# Patient Record
Sex: MALE | Race: WHITE | Employment: OTHER | URBAN - METROPOLITAN AREA
[De-identification: names, ages, dates, MRNs, and addresses within clinical notes are randomized per-mention and may not be internally consistent; named-entity substitution may affect disease eponyms.]

---

## 2017-04-05 ENCOUNTER — OFFICE VISIT (OUTPATIENT)
Dept: FAMILY MEDICINE CLINIC | Age: 77
End: 2017-04-05
Payer: MEDICARE

## 2017-04-05 VITALS
SYSTOLIC BLOOD PRESSURE: 118 MMHG | HEART RATE: 88 BPM | WEIGHT: 215 LBS | DIASTOLIC BLOOD PRESSURE: 72 MMHG | OXYGEN SATURATION: 96 % | BODY MASS INDEX: 31.75 KG/M2

## 2017-04-05 DIAGNOSIS — R22.42 LEG MASS, LEFT: ICD-10-CM

## 2017-04-05 DIAGNOSIS — R94.31 ABNORMAL EKG: ICD-10-CM

## 2017-04-05 DIAGNOSIS — J44.9 OBSTRUCTIVE CHRONIC BRONCHITIS WITHOUT EXACERBATION (HCC): Primary | ICD-10-CM

## 2017-04-05 DIAGNOSIS — F34.1 DYSTHYMIC DISORDER: ICD-10-CM

## 2017-04-05 DIAGNOSIS — R07.89 OTHER CHEST PAIN: ICD-10-CM

## 2017-04-05 DIAGNOSIS — I10 ESSENTIAL HYPERTENSION: ICD-10-CM

## 2017-04-05 DIAGNOSIS — E78.00 PURE HYPERCHOLESTEROLEMIA: ICD-10-CM

## 2017-04-05 PROCEDURE — G8417 CALC BMI ABV UP PARAM F/U: HCPCS | Performed by: FAMILY MEDICINE

## 2017-04-05 PROCEDURE — 93000 ELECTROCARDIOGRAM COMPLETE: CPT | Performed by: FAMILY MEDICINE

## 2017-04-05 PROCEDURE — 1036F TOBACCO NON-USER: CPT | Performed by: FAMILY MEDICINE

## 2017-04-05 PROCEDURE — 3023F SPIROM DOC REV: CPT | Performed by: FAMILY MEDICINE

## 2017-04-05 PROCEDURE — 4040F PNEUMOC VAC/ADMIN/RCVD: CPT | Performed by: FAMILY MEDICINE

## 2017-04-05 PROCEDURE — 1123F ACP DISCUSS/DSCN MKR DOCD: CPT | Performed by: FAMILY MEDICINE

## 2017-04-05 PROCEDURE — G8926 SPIRO NO PERF OR DOC: HCPCS | Performed by: FAMILY MEDICINE

## 2017-04-05 PROCEDURE — 99214 OFFICE O/P EST MOD 30 MIN: CPT | Performed by: FAMILY MEDICINE

## 2017-04-05 PROCEDURE — G8427 DOCREV CUR MEDS BY ELIG CLIN: HCPCS | Performed by: FAMILY MEDICINE

## 2017-04-05 RX ORDER — AMLODIPINE BESYLATE 5 MG/1
5 TABLET ORAL DAILY
Qty: 90 TABLET | Refills: 3 | Status: SHIPPED | OUTPATIENT
Start: 2017-04-05 | End: 2017-05-05 | Stop reason: SDUPTHER

## 2017-04-05 RX ORDER — LOVASTATIN 40 MG/1
40 TABLET ORAL NIGHTLY
Qty: 90 TABLET | Refills: 3 | Status: SHIPPED | OUTPATIENT
Start: 2017-04-05 | End: 2017-10-09 | Stop reason: SDUPTHER

## 2017-04-05 RX ORDER — PROPRANOLOL HYDROCHLORIDE 80 MG/1
80 CAPSULE, EXTENDED RELEASE ORAL DAILY
Qty: 90 CAPSULE | Refills: 3 | Status: SHIPPED | OUTPATIENT
Start: 2017-04-05 | End: 2017-10-09 | Stop reason: SDUPTHER

## 2017-04-05 RX ORDER — LISINOPRIL 30 MG/1
30 TABLET ORAL DAILY
Qty: 90 TABLET | Refills: 3 | Status: SHIPPED | OUTPATIENT
Start: 2017-04-05 | End: 2017-10-09 | Stop reason: SDUPTHER

## 2017-04-05 ASSESSMENT — ENCOUNTER SYMPTOMS
HOARSE VOICE: 0
DIFFICULTY BREATHING: 1
COUGH: 0
SHORTNESS OF BREATH: 1
CHEST TIGHTNESS: 0
WHEEZING: 0
SPUTUM PRODUCTION: 0
BLURRED VISION: 0

## 2017-04-05 ASSESSMENT — COPD QUESTIONNAIRES: COPD: 1

## 2017-04-16 ASSESSMENT — ENCOUNTER SYMPTOMS
CONSTIPATION: 0
NAUSEA: 0
TROUBLE SWALLOWING: 0
DIARRHEA: 0
FACIAL SWELLING: 0
PHOTOPHOBIA: 0
COLOR CHANGE: 0
ABDOMINAL PAIN: 0
ABDOMINAL DISTENTION: 0
VOMITING: 0
BACK PAIN: 0

## 2017-04-21 ENCOUNTER — HOSPITAL ENCOUNTER (OUTPATIENT)
Dept: NUCLEAR MEDICINE | Age: 77
Discharge: HOME | End: 2017-04-21
Payer: MEDICARE

## 2017-04-21 ENCOUNTER — HOSPITAL ENCOUNTER (OUTPATIENT)
Dept: NON INVASIVE DIAGNOSTICS | Age: 77
Discharge: HOME | End: 2017-04-21
Payer: MEDICARE

## 2017-04-21 DIAGNOSIS — R07.89 OTHER CHEST PAIN: ICD-10-CM

## 2017-04-21 DIAGNOSIS — R94.31 ABNORMAL EKG: ICD-10-CM

## 2017-04-21 LAB
LV EF: 60 %
LVEF MODALITY: NORMAL

## 2017-04-21 PROCEDURE — 78452 HT MUSCLE IMAGE SPECT MULT: CPT

## 2017-04-21 PROCEDURE — 93306 TTE W/DOPPLER COMPLETE: CPT

## 2017-04-21 PROCEDURE — 3430000000 HC RX DIAGNOSTIC RADIOPHARMACEUTICAL: Performed by: FAMILY MEDICINE

## 2017-04-21 PROCEDURE — A9500 TC99M SESTAMIBI: HCPCS | Performed by: FAMILY MEDICINE

## 2017-04-21 PROCEDURE — 93017 CV STRESS TEST TRACING ONLY: CPT

## 2017-04-21 RX ADMIN — TETRAKIS(2-METHOXYISOBUTYLISOCYANIDE)COPPER(I) TETRAFLUOROBORATE 30 MILLICURIE: 1 INJECTION, POWDER, LYOPHILIZED, FOR SOLUTION INTRAVENOUS at 09:20

## 2017-04-21 RX ADMIN — TETRAKIS(2-METHOXYISOBUTYLISOCYANIDE)COPPER(I) TETRAFLUOROBORATE 10 MILLICURIE: 1 INJECTION, POWDER, LYOPHILIZED, FOR SOLUTION INTRAVENOUS at 08:05

## 2017-04-25 ENCOUNTER — TELEPHONE (OUTPATIENT)
Dept: FAMILY MEDICINE CLINIC | Age: 77
End: 2017-04-25

## 2017-04-25 DIAGNOSIS — R94.39 ABNORMAL STRESS TEST: Primary | ICD-10-CM

## 2017-05-01 ENCOUNTER — TELEPHONE (OUTPATIENT)
Dept: CARDIOLOGY CLINIC | Age: 77
End: 2017-05-01

## 2017-05-01 DIAGNOSIS — I10 ESSENTIAL HYPERTENSION, BENIGN: ICD-10-CM

## 2017-05-01 DIAGNOSIS — E55.9 UNSPECIFIED VITAMIN D DEFICIENCY: ICD-10-CM

## 2017-05-01 DIAGNOSIS — E78.00 PURE HYPERCHOLESTEROLEMIA: ICD-10-CM

## 2017-05-01 DIAGNOSIS — R94.39 ABNORMAL STRESS TEST: Primary | ICD-10-CM

## 2017-05-01 DIAGNOSIS — J43.1 PANLOBULAR EMPHYSEMA (HCC): ICD-10-CM

## 2017-05-01 DIAGNOSIS — R06.02 SHORTNESS OF BREATH: ICD-10-CM

## 2017-05-01 DIAGNOSIS — R07.9 CHEST PAIN, UNSPECIFIED TYPE: ICD-10-CM

## 2017-05-05 ENCOUNTER — HOSPITAL ENCOUNTER (OUTPATIENT)
Age: 77
Discharge: HOME | End: 2017-05-05
Payer: MEDICARE

## 2017-05-05 ENCOUNTER — HOSPITAL ENCOUNTER (OUTPATIENT)
Dept: GENERAL RADIOLOGY | Age: 77
Discharge: HOME | End: 2017-05-05
Payer: MEDICARE

## 2017-05-05 ENCOUNTER — OFFICE VISIT (OUTPATIENT)
Dept: CARDIOLOGY CLINIC | Age: 77
End: 2017-05-05
Payer: MEDICARE

## 2017-05-05 VITALS
BODY MASS INDEX: 31.16 KG/M2 | SYSTOLIC BLOOD PRESSURE: 170 MMHG | WEIGHT: 211 LBS | DIASTOLIC BLOOD PRESSURE: 90 MMHG | OXYGEN SATURATION: 94 % | HEART RATE: 66 BPM

## 2017-05-05 DIAGNOSIS — R06.02 SHORTNESS OF BREATH: ICD-10-CM

## 2017-05-05 DIAGNOSIS — R94.39 ABNORMAL STRESS TEST: ICD-10-CM

## 2017-05-05 DIAGNOSIS — E78.00 PURE HYPERCHOLESTEROLEMIA: ICD-10-CM

## 2017-05-05 DIAGNOSIS — R06.02 SOB (SHORTNESS OF BREATH): Primary | ICD-10-CM

## 2017-05-05 DIAGNOSIS — I10 ESSENTIAL HYPERTENSION, BENIGN: ICD-10-CM

## 2017-05-05 DIAGNOSIS — R07.9 CHEST PAIN, UNSPECIFIED TYPE: ICD-10-CM

## 2017-05-05 DIAGNOSIS — J43.1 PANLOBULAR EMPHYSEMA (HCC): ICD-10-CM

## 2017-05-05 DIAGNOSIS — E55.9 UNSPECIFIED VITAMIN D DEFICIENCY: ICD-10-CM

## 2017-05-05 DIAGNOSIS — F34.1 DYSTHYMIC DISORDER: ICD-10-CM

## 2017-05-05 LAB
ABSOLUTE EOS #: 0.3 K/UL (ref 0–0.4)
ABSOLUTE LYMPH #: 2.3 K/UL (ref 0.9–2.5)
ABSOLUTE MONO #: 0.7 K/UL (ref 0–1)
ALBUMIN SERPL-MCNC: 4.6 G/DL (ref 3.5–5.2)
ALBUMIN/GLOBULIN RATIO: ABNORMAL (ref 1–2.5)
ALP BLD-CCNC: 74 U/L (ref 40–129)
ALT SERPL-CCNC: 19 U/L (ref 5–41)
ANION GAP SERPL CALCULATED.3IONS-SCNC: 10 MMOL/L (ref 9–17)
AST SERPL-CCNC: 20 U/L
BASOPHILS # BLD: 1 %
BASOPHILS ABSOLUTE: 0.1 K/UL (ref 0–0.2)
BILIRUB SERPL-MCNC: 0.51 MG/DL (ref 0.3–1.2)
BUN BLDV-MCNC: 19 MG/DL (ref 8–23)
BUN/CREAT BLD: 20 (ref 9–20)
CALCIUM SERPL-MCNC: 9.7 MG/DL (ref 8.6–10.4)
CHLORIDE BLD-SCNC: 104 MMOL/L (ref 98–107)
CHOLESTEROL/HDL RATIO: 2.9
CHOLESTEROL: 164 MG/DL
CO2: 28 MMOL/L (ref 20–31)
CREAT SERPL-MCNC: 0.94 MG/DL (ref 0.7–1.2)
DIFFERENTIAL TYPE: YES
EOSINOPHILS RELATIVE PERCENT: 4 %
GFR AFRICAN AMERICAN: >60 ML/MIN
GFR NON-AFRICAN AMERICAN: >60 ML/MIN
GFR SERPL CREATININE-BSD FRML MDRD: ABNORMAL ML/MIN/{1.73_M2}
GFR SERPL CREATININE-BSD FRML MDRD: ABNORMAL ML/MIN/{1.73_M2}
GLUCOSE BLD-MCNC: 118 MG/DL (ref 70–99)
HCT VFR BLD CALC: 45.7 % (ref 41–53)
HDLC SERPL-MCNC: 57 MG/DL
HEMOGLOBIN: 15 G/DL (ref 13.5–17.5)
LDL CHOLESTEROL: 87 MG/DL (ref 0–130)
LYMPHOCYTES # BLD: 28 %
MAGNESIUM: 2.5 MG/DL (ref 1.6–2.6)
MCH RBC QN AUTO: 31 PG (ref 26–34)
MCHC RBC AUTO-ENTMCNC: 32.9 G/DL (ref 31–37)
MCV RBC AUTO: 94.3 FL (ref 80–100)
MONOCYTES # BLD: 8 %
PATIENT FASTING?: YES
PDW BLD-RTO: 12.5 % (ref 12.1–15.2)
PLATELET # BLD: 253 K/UL (ref 140–450)
PLATELET ESTIMATE: NORMAL
PMV BLD AUTO: NORMAL FL (ref 6–12)
POTASSIUM SERPL-SCNC: 5 MMOL/L (ref 3.7–5.3)
RBC # BLD: 4.84 M/UL (ref 4.5–5.9)
RBC # BLD: NORMAL 10*6/UL
SEG NEUTROPHILS: 59 %
SEGMENTED NEUTROPHILS ABSOLUTE COUNT: 5 K/UL (ref 2.1–6.5)
SODIUM BLD-SCNC: 142 MMOL/L (ref 135–144)
TOTAL PROTEIN: 7.7 G/DL (ref 6.4–8.3)
TRIGL SERPL-MCNC: 102 MG/DL
TSH SERPL DL<=0.05 MIU/L-ACNC: 1.31 MIU/L (ref 0.3–5)
VITAMIN D 25-HYDROXY: 30.2 NG/ML (ref 30–100)
VLDLC SERPL CALC-MCNC: NORMAL MG/DL (ref 1–30)
WBC # BLD: 8.3 K/UL (ref 3.5–11)
WBC # BLD: NORMAL 10*3/UL

## 2017-05-05 PROCEDURE — 80061 LIPID PANEL: CPT

## 2017-05-05 PROCEDURE — 99204 OFFICE O/P NEW MOD 45 MIN: CPT | Performed by: INTERNAL MEDICINE

## 2017-05-05 PROCEDURE — 82306 VITAMIN D 25 HYDROXY: CPT

## 2017-05-05 PROCEDURE — 80053 COMPREHEN METABOLIC PANEL: CPT

## 2017-05-05 PROCEDURE — 71020 XR CHEST STANDARD TWO VW: CPT

## 2017-05-05 PROCEDURE — 36415 COLL VENOUS BLD VENIPUNCTURE: CPT

## 2017-05-05 PROCEDURE — 93005 ELECTROCARDIOGRAM TRACING: CPT

## 2017-05-05 PROCEDURE — 1036F TOBACCO NON-USER: CPT | Performed by: INTERNAL MEDICINE

## 2017-05-05 PROCEDURE — G8417 CALC BMI ABV UP PARAM F/U: HCPCS | Performed by: INTERNAL MEDICINE

## 2017-05-05 PROCEDURE — 4040F PNEUMOC VAC/ADMIN/RCVD: CPT | Performed by: INTERNAL MEDICINE

## 2017-05-05 PROCEDURE — 85025 COMPLETE CBC W/AUTO DIFF WBC: CPT

## 2017-05-05 PROCEDURE — 1123F ACP DISCUSS/DSCN MKR DOCD: CPT | Performed by: INTERNAL MEDICINE

## 2017-05-05 PROCEDURE — 84443 ASSAY THYROID STIM HORMONE: CPT

## 2017-05-05 PROCEDURE — G8427 DOCREV CUR MEDS BY ELIG CLIN: HCPCS | Performed by: INTERNAL MEDICINE

## 2017-05-05 PROCEDURE — 83735 ASSAY OF MAGNESIUM: CPT

## 2017-05-05 RX ORDER — AMLODIPINE BESYLATE 5 MG/1
5 TABLET ORAL 2 TIMES DAILY
Qty: 180 TABLET | Refills: 3 | Status: SHIPPED | OUTPATIENT
Start: 2017-05-05 | End: 2017-10-09 | Stop reason: SDUPTHER

## 2017-05-08 ENCOUNTER — HOSPITAL ENCOUNTER (OUTPATIENT)
Dept: PULMONOLOGY | Age: 77
Discharge: HOME | End: 2017-05-08
Payer: MEDICARE

## 2017-05-08 DIAGNOSIS — R06.02 SOB (SHORTNESS OF BREATH): ICD-10-CM

## 2017-05-08 PROCEDURE — 94729 DIFFUSING CAPACITY: CPT

## 2017-05-08 PROCEDURE — 94729 DIFFUSING CAPACITY: CPT | Performed by: INTERNAL MEDICINE

## 2017-05-08 PROCEDURE — 6360000002 HC RX W HCPCS: Performed by: INTERNAL MEDICINE

## 2017-05-08 PROCEDURE — 94060 EVALUATION OF WHEEZING: CPT | Performed by: INTERNAL MEDICINE

## 2017-05-08 PROCEDURE — 94060 EVALUATION OF WHEEZING: CPT

## 2017-05-08 PROCEDURE — 94726 PLETHYSMOGRAPHY LUNG VOLUMES: CPT

## 2017-05-08 RX ORDER — ALBUTEROL SULFATE 2.5 MG/3ML
2.5 SOLUTION RESPIRATORY (INHALATION) ONCE
Status: COMPLETED | OUTPATIENT
Start: 2017-05-08 | End: 2017-05-08

## 2017-05-08 RX ADMIN — ALBUTEROL SULFATE 2.5 MG: 2.5 SOLUTION RESPIRATORY (INHALATION) at 13:50

## 2017-05-09 DIAGNOSIS — J44.9 CHRONIC OBSTRUCTIVE PULMONARY DISEASE, UNSPECIFIED COPD TYPE (HCC): ICD-10-CM

## 2017-05-12 ENCOUNTER — OFFICE VISIT (OUTPATIENT)
Dept: CARDIOLOGY CLINIC | Age: 77
End: 2017-05-12
Payer: MEDICARE

## 2017-05-12 VITALS
WEIGHT: 215 LBS | HEART RATE: 68 BPM | BODY MASS INDEX: 31.75 KG/M2 | SYSTOLIC BLOOD PRESSURE: 160 MMHG | OXYGEN SATURATION: 94 % | DIASTOLIC BLOOD PRESSURE: 80 MMHG

## 2017-05-12 DIAGNOSIS — R06.02 SOB (SHORTNESS OF BREATH): Primary | ICD-10-CM

## 2017-05-12 PROCEDURE — G8417 CALC BMI ABV UP PARAM F/U: HCPCS | Performed by: INTERNAL MEDICINE

## 2017-05-12 PROCEDURE — 1036F TOBACCO NON-USER: CPT | Performed by: INTERNAL MEDICINE

## 2017-05-12 PROCEDURE — 1123F ACP DISCUSS/DSCN MKR DOCD: CPT | Performed by: INTERNAL MEDICINE

## 2017-05-12 PROCEDURE — 99213 OFFICE O/P EST LOW 20 MIN: CPT | Performed by: INTERNAL MEDICINE

## 2017-05-12 PROCEDURE — G8427 DOCREV CUR MEDS BY ELIG CLIN: HCPCS | Performed by: INTERNAL MEDICINE

## 2017-05-12 PROCEDURE — 4040F PNEUMOC VAC/ADMIN/RCVD: CPT | Performed by: INTERNAL MEDICINE

## 2017-05-12 RX ORDER — ALBUTEROL SULFATE 2.5 MG/3ML
2.5 SOLUTION RESPIRATORY (INHALATION) EVERY 6 HOURS PRN
Qty: 120 EACH | Refills: 11 | Status: SHIPPED | OUTPATIENT
Start: 2017-05-12 | End: 2019-05-29 | Stop reason: SDUPTHER

## 2017-05-12 RX ORDER — ALBUTEROL SULFATE 2.5 MG/3ML
2.5 SOLUTION RESPIRATORY (INHALATION) EVERY 6 HOURS PRN
COMMUNITY
End: 2017-05-12 | Stop reason: SDUPTHER

## 2017-05-15 LAB
EKG ATRIAL RATE: 63 BPM
EKG P AXIS: 66 DEGREES
EKG P-R INTERVAL: 212 MS
EKG Q-T INTERVAL: 426 MS
EKG QRS DURATION: 128 MS
EKG QTC CALCULATION (BAZETT): 435 MS
EKG R AXIS: 79 DEGREES
EKG T AXIS: 47 DEGREES
EKG VENTRICULAR RATE: 63 BPM

## 2017-08-14 ENCOUNTER — OFFICE VISIT (OUTPATIENT)
Dept: CARDIOLOGY CLINIC | Age: 77
End: 2017-08-14
Payer: MEDICARE

## 2017-08-14 VITALS
DIASTOLIC BLOOD PRESSURE: 70 MMHG | WEIGHT: 214 LBS | SYSTOLIC BLOOD PRESSURE: 140 MMHG | BODY MASS INDEX: 31.6 KG/M2 | HEART RATE: 62 BPM | OXYGEN SATURATION: 97 %

## 2017-08-14 DIAGNOSIS — E55.9 VITAMIN D DEFICIENCY DISEASE: ICD-10-CM

## 2017-08-14 DIAGNOSIS — J43.1 PANLOBULAR EMPHYSEMA (HCC): ICD-10-CM

## 2017-08-14 DIAGNOSIS — R73.01 IMPAIRED FASTING GLUCOSE: ICD-10-CM

## 2017-08-14 DIAGNOSIS — I10 ESSENTIAL HYPERTENSION, BENIGN: ICD-10-CM

## 2017-08-14 DIAGNOSIS — E78.00 PURE HYPERCHOLESTEROLEMIA: Primary | ICD-10-CM

## 2017-08-14 PROCEDURE — 3023F SPIROM DOC REV: CPT | Performed by: INTERNAL MEDICINE

## 2017-08-14 PROCEDURE — 4040F PNEUMOC VAC/ADMIN/RCVD: CPT | Performed by: INTERNAL MEDICINE

## 2017-08-14 PROCEDURE — G8428 CUR MEDS NOT DOCUMENT: HCPCS | Performed by: INTERNAL MEDICINE

## 2017-08-14 PROCEDURE — 1123F ACP DISCUSS/DSCN MKR DOCD: CPT | Performed by: INTERNAL MEDICINE

## 2017-08-14 PROCEDURE — 1036F TOBACCO NON-USER: CPT | Performed by: INTERNAL MEDICINE

## 2017-08-14 PROCEDURE — G8926 SPIRO NO PERF OR DOC: HCPCS | Performed by: INTERNAL MEDICINE

## 2017-08-14 PROCEDURE — 99213 OFFICE O/P EST LOW 20 MIN: CPT | Performed by: INTERNAL MEDICINE

## 2017-08-14 PROCEDURE — G8417 CALC BMI ABV UP PARAM F/U: HCPCS | Performed by: INTERNAL MEDICINE

## 2017-09-12 ENCOUNTER — TELEPHONE (OUTPATIENT)
Dept: FAMILY MEDICINE CLINIC | Age: 77
End: 2017-09-12

## 2017-09-12 RX ORDER — AZITHROMYCIN 250 MG/1
TABLET, FILM COATED ORAL
Qty: 1 PACKET | Refills: 0 | Status: SHIPPED | OUTPATIENT
Start: 2017-09-12 | End: 2017-09-22

## 2017-10-09 ENCOUNTER — HOSPITAL ENCOUNTER (OUTPATIENT)
Age: 77
Discharge: HOME | End: 2017-10-09
Payer: MEDICARE

## 2017-10-09 ENCOUNTER — OFFICE VISIT (OUTPATIENT)
Dept: FAMILY MEDICINE CLINIC | Age: 77
End: 2017-10-09
Payer: MEDICARE

## 2017-10-09 ENCOUNTER — HOSPITAL ENCOUNTER (OUTPATIENT)
Dept: GENERAL RADIOLOGY | Age: 77
Discharge: HOME | End: 2017-10-09
Payer: MEDICARE

## 2017-10-09 VITALS
WEIGHT: 216 LBS | BODY MASS INDEX: 31.9 KG/M2 | HEART RATE: 64 BPM | DIASTOLIC BLOOD PRESSURE: 78 MMHG | SYSTOLIC BLOOD PRESSURE: 120 MMHG | OXYGEN SATURATION: 96 %

## 2017-10-09 DIAGNOSIS — I10 ESSENTIAL HYPERTENSION, BENIGN: ICD-10-CM

## 2017-10-09 DIAGNOSIS — R22.42 MASS OF LEFT LOWER LEG: ICD-10-CM

## 2017-10-09 DIAGNOSIS — Z23 NEED FOR INFLUENZA VACCINATION: ICD-10-CM

## 2017-10-09 DIAGNOSIS — J44.9 CHRONIC OBSTRUCTIVE PULMONARY DISEASE, UNSPECIFIED COPD TYPE (HCC): Primary | ICD-10-CM

## 2017-10-09 DIAGNOSIS — F34.1 DYSTHYMIC DISORDER: ICD-10-CM

## 2017-10-09 PROCEDURE — 99214 OFFICE O/P EST MOD 30 MIN: CPT | Performed by: FAMILY MEDICINE

## 2017-10-09 PROCEDURE — G0008 ADMIN INFLUENZA VIRUS VAC: HCPCS | Performed by: FAMILY MEDICINE

## 2017-10-09 PROCEDURE — G8417 CALC BMI ABV UP PARAM F/U: HCPCS | Performed by: FAMILY MEDICINE

## 2017-10-09 PROCEDURE — 1123F ACP DISCUSS/DSCN MKR DOCD: CPT | Performed by: FAMILY MEDICINE

## 2017-10-09 PROCEDURE — 73590 X-RAY EXAM OF LOWER LEG: CPT

## 2017-10-09 PROCEDURE — 90686 IIV4 VACC NO PRSV 0.5 ML IM: CPT | Performed by: FAMILY MEDICINE

## 2017-10-09 PROCEDURE — 3023F SPIROM DOC REV: CPT | Performed by: FAMILY MEDICINE

## 2017-10-09 PROCEDURE — 4040F PNEUMOC VAC/ADMIN/RCVD: CPT | Performed by: FAMILY MEDICINE

## 2017-10-09 PROCEDURE — G8427 DOCREV CUR MEDS BY ELIG CLIN: HCPCS | Performed by: FAMILY MEDICINE

## 2017-10-09 PROCEDURE — G8484 FLU IMMUNIZE NO ADMIN: HCPCS | Performed by: FAMILY MEDICINE

## 2017-10-09 PROCEDURE — G8926 SPIRO NO PERF OR DOC: HCPCS | Performed by: FAMILY MEDICINE

## 2017-10-09 PROCEDURE — 1036F TOBACCO NON-USER: CPT | Performed by: FAMILY MEDICINE

## 2017-10-09 RX ORDER — AMLODIPINE BESYLATE 5 MG/1
5 TABLET ORAL 2 TIMES DAILY
Qty: 180 TABLET | Refills: 3 | Status: SHIPPED | OUTPATIENT
Start: 2017-10-09 | End: 2018-04-09 | Stop reason: SDUPTHER

## 2017-10-09 RX ORDER — LOVASTATIN 40 MG/1
40 TABLET ORAL NIGHTLY
Qty: 90 TABLET | Refills: 3 | Status: SHIPPED | OUTPATIENT
Start: 2017-10-09 | End: 2018-04-09 | Stop reason: SDUPTHER

## 2017-10-09 RX ORDER — LISINOPRIL 30 MG/1
30 TABLET ORAL DAILY
Qty: 90 TABLET | Refills: 3 | Status: SHIPPED | OUTPATIENT
Start: 2017-10-09 | End: 2018-04-09 | Stop reason: SDUPTHER

## 2017-10-09 RX ORDER — PROPRANOLOL HYDROCHLORIDE 80 MG/1
80 CAPSULE, EXTENDED RELEASE ORAL DAILY
Qty: 90 CAPSULE | Refills: 3 | Status: SHIPPED | OUTPATIENT
Start: 2017-10-09 | End: 2018-04-09 | Stop reason: SDUPTHER

## 2017-10-09 ASSESSMENT — ENCOUNTER SYMPTOMS
SHORTNESS OF BREATH: 1
COUGH: 0
CONSTIPATION: 0
TROUBLE SWALLOWING: 0
VOMITING: 0
NAUSEA: 0
ORTHOPNEA: 0
ABDOMINAL PAIN: 0
ABDOMINAL DISTENTION: 0
PHOTOPHOBIA: 0
FACIAL SWELLING: 0
WHEEZING: 0
SPUTUM PRODUCTION: 0
BACK PAIN: 0
COLOR CHANGE: 0
BLURRED VISION: 0
DIFFICULTY BREATHING: 0
FREQUENT THROAT CLEARING: 0
DIARRHEA: 0

## 2017-10-09 ASSESSMENT — COPD QUESTIONNAIRES: COPD: 1

## 2017-10-09 NOTE — PATIENT INSTRUCTIONS
SURVEY:    You may be receiving a survey from [facility] regarding your visit today. Please complete the survey to enable us to provide the highest quality of care to you and your family. If you cannot score us as very good on any question, please call the office to discuss how we could have made your experience exceptional.     Thank you.

## 2017-10-09 NOTE — PROGRESS NOTES
year ago. The problem is unchanged. The problem is controlled. Associated symptoms include shortness of breath (at baseline). Pertinent negatives include no anxiety, blurred vision, chest pain, headaches, malaise/fatigue, neck pain, orthopnea, palpitations, peripheral edema, PND or sweats. Risk factors for coronary artery disease include male gender and obesity. Past treatments include ACE inhibitors and calcium channel blockers. The current treatment provides significant improvement. Compliance problems include diet and exercise. Mental Health Problem   The primary symptoms include dysphoric mood. The current episode started more than 1 month ago. This is a chronic problem. The degree of incapacity that he is experiencing as a consequence of his illness is mild. Additional symptoms of the illness include anhedonia (mild) and fatigue. Additional symptoms of the illness do not include appetite change, unexpected weight change, headaches or abdominal pain. He does not admit to suicidal ideas. He does not have a plan to commit suicide. He does not contemplate harming himself. He has not already injured self. He does not contemplate injuring another person. He has not already  injured another person. Pt doing well on the zoloft, would like to continue    Pt notes left leg mass that is getting bigger. Pt states that it has been getting bigger over the past 1-2 months and also notes that it is painful to the touch. Pt denies any trauma ot the area. Pt notes that his shortness of breath is at his baseline.       Past Medical History:     Past Medical History:   Diagnosis Date    [facility] obstructive [facility] disease) [facility]     Hyperlipidemia     Hypertension     Migraine       Reviewed all health maintenance requirements and ordered appropriate tests  Health Maintenance Due   Topic Date Due    Zostavax vaccine  05/01/2000    Flu vaccine (1) 09/01/2017       Past Surgical History:     Past Surgical heard.  Pulmonary/Chest: Effort normal and breath sounds normal. No respiratory distress. He has no wheezes. He has no rales. He exhibits no tenderness. Abdominal: Soft. Bowel sounds are normal. He exhibits no distension. There is no tenderness. There is no rebound and no guarding. Musculoskeletal: Normal range of motion. He exhibits no edema. Lymphadenopathy:     He has no cervical adenopathy. Neurological: He is alert and oriented to person, place, and time. He exhibits normal muscle tone. Coordination normal.   Skin: Skin is warm and dry. No rash noted. No erythema. Psychiatric: He has a normal mood and affect. His behavior is normal. Judgment and thought content normal.   Nursing note and vitals reviewed. Vitals:  /78   Pulse 64   Wt 216 lb (98 kg)   SpO2 96%   BMI 31.90 kg/m²       Data:     Lab Results   Component Value Date     05/05/2017    K 5.0 05/05/2017     05/05/2017    CO2 28 05/05/2017    BUN 19 05/05/2017    CREATININE 0.94 05/05/2017    GLUCOSE 118 05/05/2017    PROT 7.7 05/05/2017    LABALBU 4.6 05/05/2017    BILITOT 0.51 05/05/2017    ALKPHOS 74 05/05/2017    AST 20 05/05/2017    ALT 19 05/05/2017     Lab Results   Component Value Date    WBC 8.3 05/05/2017    RBC 4.84 05/05/2017    HGB 15.0 05/05/2017    HCT 45.7 05/05/2017    MCV 94.3 05/05/2017    MCH 31.0 05/05/2017    MCHC 32.9 05/05/2017    RDW 12.5 05/05/2017     05/05/2017    MPV NOT REPORTED 05/05/2017     Lab Results   Component Value Date    TSH 1.31 05/05/2017     Lab Results   Component Value Date    CHOL 164 05/05/2017    CHOL 154.0 09/27/2016    HDL 57 05/05/2017    PSA 0.73 05/04/2015    LABA1C 5.9 09/27/2016          Assessment:       1. Chronic obstructive pulmonary disease, unspecified COPD type ([address]  tiotropium (SPIRIVA HANDIHALER) 18 MCG inhalation capsule   2.  Dysthymic disorder  lisinopril (ZESTRIL) 30 MG tablet    sertraline (ZOLOFT) 50 MG tablet    amLODIPine (NORVASC) 5 MG extended release capsule     Sig: Take 1 capsule by mouth daily     Dispense:  90 capsule     Refill:  3    sertraline (ZOLOFT) 50 MG tablet     Sig: Take 1 tablet by mouth daily     Dispense:  90 tablet     Refill:  3    amLODIPine (NORVASC) 5 MG tablet     Sig: Take 1 tablet by mouth 2 times daily     Dispense:  180 tablet     Refill:  3    tiotropium (SPIRIVA HANDIHALER) 18 MCG inhalation capsule     Sig: Inhale 1 capsule into the lungs daily     Dispense:  90 capsule     Refill:  3     Orders Placed This Encounter   Procedures    XR TIBIA FIBULA LEFT (2 VIEWS)     Standing Status:   Future     Standing Expiration Date:   10/9/2018     Order Specific Question:   Reason for exam:     Answer:   mass of left leg    INFLUENZA, QUADV, 3 YRS AND OLDER, IM, PF, PREFILL SYR OR SDV, 0.5ML (MELISSA Mccartney)   Tonya Madison MD, General Surgery [facility]     Referral Priority:   Routine     Referral Type:   Consult for Advice and Opinion     Referral Reason:   Specialty Services Required     Referred to Provider:   Xi Bloom MD     Requested Specialty:   General Surgery     Number of Visits Requested:   1         Patient Instructions     SURVEY:    You may be receiving a survey from [employer] regarding your visit today. Please complete the survey to enable us to provide the highest quality of care to you and your family. If you cannot score us as very good on any question, please call the office to discuss how we could have made your experience exceptional.     Thank you.           Electronically signed by Chantelle Hanna DO on 10/9/2017 at 9:39 AM         Completed Refills   Requested Prescriptions     Signed Prescriptions Disp Refills    lisinopril (ZESTRIL) 30 MG tablet 90 tablet 3     Sig: Take 1 tablet by mouth daily    lovastatin (MEVACOR) 40 MG tablet 90 tablet 3     Sig: Take 1 tablet by mouth nightly    propranolol (INDERAL LA) 80 MG extended release capsule 90 capsule 3     Sig:

## 2017-10-11 ENCOUNTER — TELEPHONE (OUTPATIENT)
Dept: FAMILY MEDICINE CLINIC | Age: 77
End: 2017-10-11

## 2017-10-11 DIAGNOSIS — R22.42 LOWER LEG MASS, LEFT: Primary | ICD-10-CM

## 2017-10-12 ENCOUNTER — TELEPHONE (OUTPATIENT)
Dept: FAMILY MEDICINE CLINIC | Age: 77
End: 2017-10-12

## 2017-10-12 DIAGNOSIS — M79.89 SOFT TISSUE MASS: Primary | ICD-10-CM

## 2017-10-18 ENCOUNTER — HOSPITAL ENCOUNTER (OUTPATIENT)
Dept: ULTRASOUND IMAGING | Age: 77
Discharge: HOME | End: 2017-10-18
Payer: MEDICARE

## 2017-10-18 DIAGNOSIS — M79.89 SOFT TISSUE MASS: ICD-10-CM

## 2017-10-18 PROCEDURE — 76881 US COMPL JOINT R-T W/IMG: CPT

## 2017-10-25 ENCOUNTER — INITIAL CONSULT (OUTPATIENT)
Dept: SURGERY | Age: 77
End: 2017-10-25
Payer: MEDICARE

## 2017-10-25 VITALS
DIASTOLIC BLOOD PRESSURE: 75 MMHG | WEIGHT: 216 LBS | SYSTOLIC BLOOD PRESSURE: 154 MMHG | RESPIRATION RATE: 16 BRPM | HEIGHT: 69 IN | HEART RATE: 66 BPM | BODY MASS INDEX: 31.99 KG/M2

## 2017-10-25 DIAGNOSIS — R22.42 LEG MASS, LEFT: Primary | ICD-10-CM

## 2017-10-25 PROCEDURE — G8427 DOCREV CUR MEDS BY ELIG CLIN: HCPCS | Performed by: SURGERY

## 2017-10-25 PROCEDURE — G8417 CALC BMI ABV UP PARAM F/U: HCPCS | Performed by: SURGERY

## 2017-10-25 PROCEDURE — 99203 OFFICE O/P NEW LOW 30 MIN: CPT | Performed by: SURGERY

## 2017-10-25 PROCEDURE — 4040F PNEUMOC VAC/ADMIN/RCVD: CPT | Performed by: SURGERY

## 2017-10-25 PROCEDURE — G8484 FLU IMMUNIZE NO ADMIN: HCPCS | Performed by: SURGERY

## 2017-10-25 PROCEDURE — 1036F TOBACCO NON-USER: CPT | Performed by: SURGERY

## 2017-10-25 PROCEDURE — 1123F ACP DISCUSS/DSCN MKR DOCD: CPT | Performed by: SURGERY

## 2017-10-25 NOTE — PROGRESS NOTES
Subjective:      Patient ID: Kandice Cunha is a 68 y.o. male. CC  R leg mass    HPI     Mr Regis Martinez is a pleasant 67 yo WM who has had a left lower leg mass present since childhood. It has increased in size over the past two months, and become uncomfortable. US shows this to be lobulated, with vascular flow, subcutaneous, extrafascial.  No previous excision nor biopsy. No drainage. No fevers nor chills. No history of trauma. Dr Travon Cueto has kindly referred him to me for consideration of elective excision. Review of Systems   Constitutional: Negative for activity change, appetite change, chills, fever and unexpected weight change. HENT: Negative for nosebleeds, sneezing, sore throat and trouble swallowing. Eyes: Negative for visual disturbance. Respiratory: Negative for cough, choking and shortness of breath. Cardiovascular: Negative for chest pain, palpitations and leg swelling. Gastrointestinal: Negative for abdominal pain, blood in stool, nausea and vomiting. Genitourinary: Negative for dysuria, flank pain and hematuria. Musculoskeletal: Positive for arthralgias. Negative for back pain, gait problem and myalgias. Skin:        L lower leg mass. Allergic/Immunologic: Negative for immunocompromised state. Neurological: Negative for dizziness, seizures, syncope, weakness and headaches. Hematological: Does not bruise/bleed easily. Psychiatric/Behavioral: Negative for confusion and sleep disturbance. Past Medical History:   Diagnosis Date    [facility]ch[facility] obstructive [facility] disease) [facility] [address]     Hyperlipidemia     Hypertension     Migraine        Past Surgical History:   Procedure Laterality Date    HEMORRHOID SURGERY      HERNIA REPAIR      TONSILLECTOMY         History reviewed. No pertinent family history.     Allergies:  See list    Current Outpatient Prescriptions   Medication Sig Dispense Refill    lisinopril (ZESTRIL) 30 MG tablet Take 1 tablet by mouth daily 90 tablet 3    lovastatin (MEVACOR) 40 MG tablet Take 1 tablet by mouth nightly 90 tablet 3    propranolol (INDERAL LA) 80 MG extended release capsule Take 1 capsule by mouth daily 90 capsule 3    sertraline (ZOLOFT) 50 MG tablet Take 1 tablet by mouth daily 90 tablet 3    amLODIPine (NORVASC) 5 MG tablet Take 1 tablet by mouth 2 times daily 180 tablet 3    tiotropium (SPIRIVA HANDIHALER) 18 MCG inhalation capsule Inhale 1 capsule into the lungs daily 90 capsule 3    Multiple Vitamin (MULTI VITAMIN DAILY PO) Take by mouth daily      albuterol (PROVENTIL) (2.5 MG/3ML) 0.083% nebulizer solution Take 3 mLs by nebulization every 6 hours as needed for Wheezing 120 each 11    ranitidine (ZANTAC) 150 MG tablet Take 150 mg by mouth daily as needed for Heartburn      albuterol sulfate HFA (PROAIR HFA) 108 (90 BASE) MCG/ACT inhaler Inhale 2 puffs into the lungs every 6 hours as needed for Wheezing 1 Inhaler 3    Psyllium (METAMUCIL FIBER SINGLES PO) Take  by mouth daily.  Docusate Calcium (STOOL SOFTENER PO) Take  by mouth daily.  aspirin 81 MG EC tablet Take 81 mg by mouth daily. No current facility-administered medications for this visit. Social History     Social History    Marital status:      Spouse name: N/A    Number of children: N/A    Years of education: N/A     Social History Main Topics    Smoking status: Former Smoker     Quit date: 8/15/2003    Smokeless tobacco: Never Used    Alcohol use 0.6 oz/week     1 Cans of beer per week      Comment: occasional    Drug use: No    Sexual activity: Not Currently     Other Topics Concern    None     Social History Narrative    None       Objective:   Physical Exam   Constitutional: He is oriented to person, place, and time. He appears well-developed and well-nourished. HENT:   Head: Normocephalic and atraumatic.    Mouth/Throat: Oropharynx is clear and moist.   Eyes: Conjunctivae and EOM are normal. Pupils are equal, round, and reactive to light. No scleral icterus. Neck: Normal range of motion. Neck supple. No JVD present. No tracheal deviation present. Cardiovascular: Normal rate and regular rhythm. Pulmonary/Chest: Effort normal and breath sounds normal. No respiratory distress. He exhibits no tenderness. Abdominal: Soft. Bowel sounds are normal. He exhibits no distension and no mass. There is no tenderness. There is no rebound and no guarding. Musculoskeletal: Normal range of motion. He exhibits no edema. Lymphadenopathy:     He has no cervical adenopathy. Neurological: He is alert and oriented to person, place, and time. No cranial nerve deficit. Skin: Skin is warm and dry. No rash noted. No erythema. L lower leg subcutaneous skin lesion, 4x2 cm, freely mobile. No erythema. No punctum nor other signs of drainage. Psychiatric: He has a normal mood and affect. His behavior is normal. Judgment and thought content normal.   Nursing note and vitals reviewed.        US EXTREMITY LEFT NON VASC COMPLETE   Status: Final result   Order Providers     Authorizing Encounter Billing   Lisbeth Nova DO [facility] ULTRASOUND ROOM Briseida Isaac Lisbeth Nova DO          Signed by     Signed Date/Time  Phone Pager   [facility] 10/18/2017 16:02 [phone]    [facility]     Read Date Phone Pager   [facility] [address] [place] [address] Rd Oct 18, 2017 [phone]    Reviewed By Haley Nettles MA on 10/19/2017 07:18   Sandra Nettles MA on 10/19/2017 07:18   Sandra Nettles MA on 10/19/2017 07:18   Lyle Cuello on 10/18/2017 17:28   Lyle Cuello on 10/18/2017 17:28   Sandra Nettles MA on 10/18/2017 16:19   Lisbeth Nova DO on 10/18/2017 16:17   Lisbeth Nova DO on 10/18/2017 16:17   Result Notes     Notes Recorded by Lyle Cuello on 10/18/2017 at 5:28 PM EDT  Please Review  ------    Notes Recorded by Lyle Cuello on 10/18/2017 at 5:28 PM EDT  Please Review  ------    Notes Recorded by Linsey Wilkerson

## 2017-11-06 ENCOUNTER — ANESTHESIA EVENT (OUTPATIENT)
Dept: OPERATING ROOM | Age: 77
End: 2017-11-06
Payer: MEDICARE

## 2017-11-13 ENCOUNTER — HOSPITAL ENCOUNTER (OUTPATIENT)
Age: 77
Setting detail: OUTPATIENT SURGERY
Discharge: HOME | End: 2017-11-13
Attending: SURGERY | Admitting: SURGERY
Payer: MEDICARE

## 2017-11-13 ENCOUNTER — ANESTHESIA (OUTPATIENT)
Dept: OPERATING ROOM | Age: 77
End: 2017-11-13
Payer: MEDICARE

## 2017-11-13 VITALS
WEIGHT: 218.6 LBS | TEMPERATURE: 97.8 F | OXYGEN SATURATION: 97 % | DIASTOLIC BLOOD PRESSURE: 63 MMHG | RESPIRATION RATE: 16 BRPM | SYSTOLIC BLOOD PRESSURE: 132 MMHG | HEART RATE: 57 BPM | HEIGHT: 71 IN | BODY MASS INDEX: 30.6 KG/M2

## 2017-11-13 VITALS
DIASTOLIC BLOOD PRESSURE: 49 MMHG | RESPIRATION RATE: 13 BRPM | TEMPERATURE: 98.6 F | OXYGEN SATURATION: 99 % | SYSTOLIC BLOOD PRESSURE: 98 MMHG

## 2017-11-13 PROBLEM — R22.42 LEG MASS, LEFT: Status: ACTIVE | Noted: 2017-11-13

## 2017-11-13 PROCEDURE — 2580000003 HC RX 258: Performed by: NURSE ANESTHETIST, CERTIFIED REGISTERED

## 2017-11-13 PROCEDURE — 7100000010 HC PHASE II RECOVERY - FIRST 15 MIN: Performed by: SURGERY

## 2017-11-13 PROCEDURE — 88341 IMHCHEM/IMCYTCHM EA ADD ANTB: CPT

## 2017-11-13 PROCEDURE — 3700000001 HC ADD 15 MINUTES (ANESTHESIA): Performed by: SURGERY

## 2017-11-13 PROCEDURE — 3600000003 HC SURGERY LEVEL 3 BASE: Performed by: SURGERY

## 2017-11-13 PROCEDURE — 6360000002 HC RX W HCPCS

## 2017-11-13 PROCEDURE — 7100000011 HC PHASE II RECOVERY - ADDTL 15 MIN: Performed by: SURGERY

## 2017-11-13 PROCEDURE — 3700000000 HC ANESTHESIA ATTENDED CARE: Performed by: SURGERY

## 2017-11-13 PROCEDURE — 6370000000 HC RX 637 (ALT 250 FOR IP): Performed by: SURGERY

## 2017-11-13 PROCEDURE — 6360000002 HC RX W HCPCS: Performed by: NURSE ANESTHETIST, CERTIFIED REGISTERED

## 2017-11-13 PROCEDURE — 2500000003 HC RX 250 WO HCPCS: Performed by: SURGERY

## 2017-11-13 PROCEDURE — 3600000013 HC SURGERY LEVEL 3 ADDTL 15MIN: Performed by: SURGERY

## 2017-11-13 PROCEDURE — A6402 STERILE GAUZE <= 16 SQ IN: HCPCS | Performed by: SURGERY

## 2017-11-13 PROCEDURE — 88307 TISSUE EXAM BY PATHOLOGIST: CPT

## 2017-11-13 PROCEDURE — 88342 IMHCHEM/IMCYTCHM 1ST ANTB: CPT

## 2017-11-13 PROCEDURE — 2500000003 HC RX 250 WO HCPCS: Performed by: NURSE ANESTHETIST, CERTIFIED REGISTERED

## 2017-11-13 PROCEDURE — 6360000002 HC RX W HCPCS: Performed by: SURGERY

## 2017-11-13 RX ORDER — GINSENG 100 MG
CAPSULE ORAL PRN
Status: DISCONTINUED | OUTPATIENT
Start: 2017-11-13 | End: 2017-11-13 | Stop reason: HOSPADM

## 2017-11-13 RX ORDER — MORPHINE SULFATE 4 MG/ML
1 INJECTION, SOLUTION INTRAMUSCULAR; INTRAVENOUS
Status: DISCONTINUED | OUTPATIENT
Start: 2017-11-13 | End: 2017-11-13 | Stop reason: HOSPADM

## 2017-11-13 RX ORDER — SODIUM CHLORIDE 0.9 % (FLUSH) 0.9 %
10 SYRINGE (ML) INJECTION EVERY 12 HOURS SCHEDULED
Status: DISCONTINUED | OUTPATIENT
Start: 2017-11-13 | End: 2017-11-13 | Stop reason: HOSPADM

## 2017-11-13 RX ORDER — ACETAMINOPHEN 325 MG/1
650 TABLET ORAL EVERY 4 HOURS PRN
Status: DISCONTINUED | OUTPATIENT
Start: 2017-11-13 | End: 2017-11-13 | Stop reason: HOSPADM

## 2017-11-13 RX ORDER — SODIUM CHLORIDE 0.9 % (FLUSH) 0.9 %
10 SYRINGE (ML) INJECTION PRN
Status: DISCONTINUED | OUTPATIENT
Start: 2017-11-13 | End: 2017-11-13 | Stop reason: HOSPADM

## 2017-11-13 RX ORDER — SODIUM CHLORIDE, SODIUM LACTATE, POTASSIUM CHLORIDE, CALCIUM CHLORIDE 600; 310; 30; 20 MG/100ML; MG/100ML; MG/100ML; MG/100ML
INJECTION, SOLUTION INTRAVENOUS CONTINUOUS
Status: DISCONTINUED | OUTPATIENT
Start: 2017-11-13 | End: 2017-11-13 | Stop reason: HOSPADM

## 2017-11-13 RX ORDER — ONDANSETRON 2 MG/ML
4 INJECTION INTRAMUSCULAR; INTRAVENOUS EVERY 6 HOURS PRN
Status: DISCONTINUED | OUTPATIENT
Start: 2017-11-13 | End: 2017-11-13 | Stop reason: HOSPADM

## 2017-11-13 RX ORDER — FENTANYL CITRATE 50 UG/ML
INJECTION, SOLUTION INTRAMUSCULAR; INTRAVENOUS PRN
Status: DISCONTINUED | OUTPATIENT
Start: 2017-11-13 | End: 2017-11-13 | Stop reason: SDUPTHER

## 2017-11-13 RX ORDER — LIDOCAINE HYDROCHLORIDE AND EPINEPHRINE 10; 10 MG/ML; UG/ML
INJECTION, SOLUTION INFILTRATION; PERINEURAL PRN
Status: DISCONTINUED | OUTPATIENT
Start: 2017-11-13 | End: 2017-11-13 | Stop reason: HOSPADM

## 2017-11-13 RX ORDER — HYDROCODONE BITARTRATE AND ACETAMINOPHEN 5; 325 MG/1; MG/1
TABLET ORAL
Qty: 20 TABLET | Refills: 0 | Status: SHIPPED | OUTPATIENT
Start: 2017-11-13 | End: 2017-12-19 | Stop reason: ALTCHOICE

## 2017-11-13 RX ORDER — SODIUM CHLORIDE, SODIUM LACTATE, POTASSIUM CHLORIDE, CALCIUM CHLORIDE 600; 310; 30; 20 MG/100ML; MG/100ML; MG/100ML; MG/100ML
INJECTION, SOLUTION INTRAVENOUS CONTINUOUS PRN
Status: DISCONTINUED | OUTPATIENT
Start: 2017-11-13 | End: 2017-11-13 | Stop reason: SDUPTHER

## 2017-11-13 RX ORDER — MIDAZOLAM HYDROCHLORIDE 1 MG/ML
INJECTION INTRAMUSCULAR; INTRAVENOUS PRN
Status: DISCONTINUED | OUTPATIENT
Start: 2017-11-13 | End: 2017-11-13 | Stop reason: SDUPTHER

## 2017-11-13 RX ORDER — PROPOFOL 10 MG/ML
INJECTION, EMULSION INTRAVENOUS CONTINUOUS PRN
Status: DISCONTINUED | OUTPATIENT
Start: 2017-11-13 | End: 2017-11-13 | Stop reason: SDUPTHER

## 2017-11-13 RX ORDER — LIDOCAINE HYDROCHLORIDE 10 MG/ML
INJECTION, SOLUTION EPIDURAL; INFILTRATION; INTRACAUDAL; PERINEURAL PRN
Status: DISCONTINUED | OUTPATIENT
Start: 2017-11-13 | End: 2017-11-13 | Stop reason: SDUPTHER

## 2017-11-13 RX ORDER — BUPIVACAINE HYDROCHLORIDE 5 MG/ML
INJECTION, SOLUTION PERINEURAL PRN
Status: DISCONTINUED | OUTPATIENT
Start: 2017-11-13 | End: 2017-11-13 | Stop reason: HOSPADM

## 2017-11-13 RX ADMIN — LIDOCAINE HYDROCHLORIDE 40 MG: 10 INJECTION, SOLUTION EPIDURAL; INFILTRATION; INTRACAUDAL; PERINEURAL at 08:05

## 2017-11-13 RX ADMIN — ACETAMINOPHEN 650 MG: 325 TABLET, FILM COATED ORAL at 10:28

## 2017-11-13 RX ADMIN — SODIUM CHLORIDE, POTASSIUM CHLORIDE, SODIUM LACTATE AND CALCIUM CHLORIDE: 600; 310; 30; 20 INJECTION, SOLUTION INTRAVENOUS at 08:03

## 2017-11-13 RX ADMIN — MIDAZOLAM HYDROCHLORIDE 1 MG: 2 INJECTION, SOLUTION INTRAMUSCULAR; INTRAVENOUS at 08:03

## 2017-11-13 RX ADMIN — PROPOFOL 100 MCG/KG/MIN: 10 INJECTION, EMULSION INTRAVENOUS at 08:05

## 2017-11-13 RX ADMIN — CEFAZOLIN SODIUM 2 G: 2 SOLUTION INTRAVENOUS at 08:03

## 2017-11-13 RX ADMIN — FENTANYL CITRATE 50 MCG: 50 INJECTION, SOLUTION INTRAMUSCULAR; INTRAVENOUS at 08:03

## 2017-11-13 ASSESSMENT — PAIN - FUNCTIONAL ASSESSMENT: PAIN_FUNCTIONAL_ASSESSMENT: 0-10

## 2017-11-13 ASSESSMENT — ENCOUNTER SYMPTOMS
TROUBLE SWALLOWING: 0
SHORTNESS OF BREATH: 0
BLOOD IN STOOL: 0
CHOKING: 0
BACK PAIN: 0
COUGH: 0
ABDOMINAL PAIN: 0
SORE THROAT: 0
VOMITING: 0
NAUSEA: 0

## 2017-11-13 ASSESSMENT — PAIN SCALES - GENERAL
PAINLEVEL_OUTOF10: 1
PAINLEVEL_OUTOF10: 0
PAINLEVEL_OUTOF10: 1

## 2017-11-13 NOTE — OP NOTE
[place] [address]                                 OPERATIVE REPORT    PATIENT NAME: Ramya Cross                      :        1940  MED REC NO:   [id number]                              ROOM:  ACCOUNT NO:   [de-identified]                           ADMIT DATE: 2017  PROVIDER:     Caio Echavarria    DATE OF PROCEDURE:  2017    PREOPERATIVE DIAGNOSIS:  Left lower leg mass. POSTOPERATIVE DIAGNOSIS:  Left lower leg mass. OPERATION:  Excision, left lower leg mass. ANESTHESIA:  MAC, local.    IV FLUIDS:  500 mL crystalloid. ESTIMATED BLOOD LOSS:  Less than 20 mL. INTRAOPERATIVE FINDINGS:  As mentioned above, subcutaneous, suprafascial  mass, excised in its entirety. Good postoperative hemostasis. Tissue  margins of the mass marked with hemoclips at the fascial level. INDICATIONS:  The patient is a pleasant 49-year-old white male who has had  a small painless lump on his left lower extremity since he was a child. Over the past two months, this had increased in size and become  uncomfortable. At this time, excision is indicated. OPERATIVE PROCEDURE:  After obtaining informed consent with discussion of  risks, benefits and alternatives including the risk of recurrence,  bleeding, infection etc, the patient was taken to the operating theater and  placed in supine position on the operating table. He received preoperative  IV antibiotics following adequate IV sedation. A 1% lidocaine was used to  topically anesthetize the skin and subcutaneous tissues directly overlying  and surrounding the subcutaneous lesion. An incision was carried down  through the skin and subcutaneous tissues along the long axis of the  lesion. The incision was deepened into the subcutaneous tissues.   The mass  was gently dissected from the surrounding tissues with selective use of  Bovie cautery. Afferent and efferent lymphatics were present similar to  that of a enlarged lymph node. Afferent and efferent vasculature were  divided between hemostats and thoroughly suture ligated with 3-0 Vicryl  suture. The mass was above the fascial plane along the medial aspect of  the gastrocnemius. It was easily  from the underlying fascia and  was not adherent. These were divided between hemostats and thoroughly  suture ligated. The mass was passed off as specimen, placed in dampened  saline gauze and sent a fresh specimen to pathology. The margins of the  excised location were marked with hemoclips. The wound was irrigated with  saline. Skin edges were reapproximated with interrupted 3-0 Vicryl and  followed by 3-0 nylon, vertical mattress and simple interrupted sutures. Bacitracin and sterile dressing were applied to the wound. All sponge and  needle and instrument counts were correct at the end of the case. The  patient tolerated the procedure well and was transferred to PACU in stable  condition. SPECIMENS:  Left lower leg subcutaneous mass. DRAINS:  None. COMPLICATIONS:  None. DISPOSITION:  To PACU. Awake, alert and stable. Following the recovery,  we will discharge the patient home with gradual advancement of the diet and  activity as tolerated. Follow up will be with me in one week to review  pathology and for suture removal.    My thanks to Dr. Gita Rubinstein for the consultation. Taisha Rodriguez    D: 11/13/2017 9:24:13       T: 11/13/2017 10:59:36     [email or web address]  Job#: [id number]     Doc#: [id number]    CC:  Parker Roberts

## 2017-11-13 NOTE — ANESTHESIA POSTPROCEDURE EVALUATION
Department of Anesthesiology  Postprocedure Note    Patient: Argenis Luevano  MRN: [id number]  YOB: 1940  Date of evaluation: 11/13/2017  Time:  9:15 AM     Procedure Summary     Date:  11/13/17 Room / Location:  [address] [place] [address] 01 / [facility] OR    Anesthesia Start:  0803 Anesthesia Stop:  9566    Procedure:  LEG LESION BIOPSY EXCISION-EXCISION LEFT LEG LESION (Left ) Diagnosis:  (LEFT LEG LESION)    Surgeon:  Rajwinder Murphy MD Responsible Provider:  Glennon Sandifer, CRNA    Anesthesia Type:  MAC ASA Status:  3          Anesthesia Type: MAC    Mariposa Phase I: Mariposa Score: 10    Mariposa Phase II:      Last vitals: Reviewed and per EMR flowsheets.        Anesthesia Post Evaluation    Patient location during evaluation: bedside  Patient participation: complete - patient participated  Level of consciousness: awake and alert  Pain score: 0  Nausea & Vomiting: no nausea and no vomiting  Complications: no  Cardiovascular status: hemodynamically stable  Respiratory status: acceptable and spontaneous ventilation  Hydration status: euvolemic

## 2017-11-13 NOTE — ANESTHESIA PRE PROCEDURE
Department of Anesthesiology  Preprocedure Note       Name:  Maira Jimenez   Age:  68 y.o.  :  1940                                          MRN:  [id number]         Date:  2017      Surgeon: Jorden Jain):  Hubert Chan MD    Procedure: Procedure(s):  LEG LESION BIOPSY EXCISION-EXCISION LEFT LEG LESION    Medications prior to admission:   Prior to Admission medications    Medication Sig Start Date End Date Taking? Authorizing Provider   lisinopril (ZESTRIL) 30 MG tablet Take 1 tablet by mouth daily 10/9/17   Laury Montilla, DO   lovastatin (MEVACOR) 40 MG tablet Take 1 tablet by mouth nightly 10/9/17   Laury Montilla, DO   propranolol (INDERAL LA) 80 MG extended release capsule Take 1 capsule by mouth daily 10/9/17   Laury Montilla, DO   sertraline (ZOLOFT) 50 MG tablet Take 1 tablet by mouth daily 10/9/17   Laury Montilla, DO   amLODIPine (NORVASC) 5 MG tablet Take 1 tablet by mouth 2 times daily 10/9/17   Laury Montilla, DO   tiotropium (SPIRIVA HANDIHALER) 18 MCG inhalation capsule Inhale 1 capsule into the lungs daily 10/9/17   Laury Montilla, DO   Multiple Vitamin (MULTI VITAMIN DAILY PO) Take by mouth daily    Historical Provider, MD   albuterol (PROVENTIL) (2.5 MG/3ML) 0.083% nebulizer solution Take 3 mLs by nebulization every 6 hours as needed for Wheezing 17   Edilson Morris MD   ranitidine (ZANTAC) 150 MG tablet Take 150 mg by mouth daily as needed for Heartburn    Historical Provider, MD   albuterol sulfate HFA (PROAIR HFA) 108 (90 BASE) MCG/ACT inhaler Inhale 2 puffs into the lungs every 6 hours as needed for Wheezing 3/30/16   Laury Montilla, DO   Psyllium (METAMUCIL FIBER SINGLES PO) Take  by mouth daily. Historical Provider, MD   Docusate Calcium (STOOL SOFTENER PO) Take  by mouth daily. Historical Provider, MD   aspirin 81 MG EC tablet Take 81 mg by mouth daily.       Historical Provider, MD       Current medications:    No current facility-administered medications for this encounter. Allergies:  No Known Allergies    Problem List:    Patient Active Problem List   Diagnosis Code    Classical migraine G43. 109    Malaise and fatigue R53.81, R53.83    Pure hypercholesterolemia E78.00    Essential hypertension, benign I10    Dysthymic disorder F34.1    Impaired fasting glucose R73.01    COPD (chronic obstructive pulmonary disease) ([facility] J44.9       Past Medical History:        Diagnosis Date    [facility]) [facility] [address]     Hyperlipidemia     Hypertension     Migraine        Past Surgical History:        Procedure Laterality Date    HEMORRHOID SURGERY      HERNIA REPAIR      TONSILLECTOMY         Social History:    Social History   Substance Use Topics    Smoking status: Former Smoker     Quit date: 8/15/2003    Smokeless tobacco: Never Used    Alcohol use 0.6 oz/week     1 Cans of beer per week      Comment: occasional                                Counseling given: Not Answered      Vital Signs (Current): There were no vitals filed for this visit.                                            BP Readings from Last 3 Encounters:   10/25/17 (!) 154/75   10/09/17 120/78   08/14/17 (!) 140/70       NPO Status:                                                                                 BMI:   Wt Readings from Last 3 Encounters:   10/25/17 216 lb (98 kg)   10/09/17 216 lb (98 kg)   08/14/17 214 lb (97.1 kg)     There is no height or weight on file to calculate BMI.    CBC:   Lab Results   Component Value Date    WBC 8.3 05/05/2017    RBC 4.84 05/05/2017    HGB 15.0 05/05/2017    HCT 45.7 05/05/2017    MCV 94.3 05/05/2017    RDW 12.5 05/05/2017     05/05/2017       CMP:   Lab Results   Component Value Date     05/05/2017    K 5.0 05/05/2017     05/05/2017    CO2 28 05/05/2017    BUN 19 05/05/2017    CREATININE 0.94 05/05/2017    GFRAA >60 05/05/2017    LABGLOM >60 05/05/2017    GLUCOSE 118 05/05/2017    PROT 7.7

## 2017-11-13 NOTE — ADDENDUM NOTE
Addendum  created 11/13/17 0925 by Palomo Ricardo CRNA    Anesthesia Intra Flowsheets edited, Anesthesia Intra Meds edited, Orders acknowledged in Narrator

## 2017-11-15 LAB — SURGICAL PATHOLOGY REPORT: NORMAL

## 2017-11-16 ENCOUNTER — OFFICE VISIT (OUTPATIENT)
Dept: SURGERY | Age: 77
End: 2017-11-16

## 2017-11-16 DIAGNOSIS — C49.22 LEIOMYOSARCOMA OF LEG, LEFT (HCC): Primary | ICD-10-CM

## 2017-11-16 PROCEDURE — 99024 POSTOP FOLLOW-UP VISIT: CPT | Performed by: SURGERY

## 2017-11-21 ENCOUNTER — OFFICE VISIT (OUTPATIENT)
Dept: SURGERY | Age: 77
End: 2017-11-21

## 2017-11-21 DIAGNOSIS — C49.22 LEIOMYOSARCOMA OF LEG, LEFT (HCC): Primary | ICD-10-CM

## 2017-11-21 PROCEDURE — 99024 POSTOP FOLLOW-UP VISIT: CPT | Performed by: SURGERY

## 2017-11-21 NOTE — LETTER
[address] [place] [address] [address] [address]  Phone: [phone]  Fax: [phone]    Guera Uriarte MD        November 22, 2017     DO [address]  [place] [address]    Patient: Henrik Cleveland  MR Number: [id number]  YOB: 1940  Date of Visit: 11/21/2017    Dear Dr. Herbie Barker: Thank you for the request for consultation for [facility] to me for the evaluation of L leg mass. Below are the relevant portions of my assessment and plan of care. Assessment:     1. Leiomyosarcoma of leg, left ([address]           Plan: Will proceed with re excision of L lower leg leiomyosarcoma biopsy site for 2cm clear margins. This will require skin graft or allograft. Risks, benefits, alternatives thoroughly reviewed and accepted by Mr Cleveland, including bleeding, infection, graft failure, etc.  Will also refer to Dr Karlee Lee. If you have questions, please do not hesitate to call me. I look forward to following Aliyah Veras along with you.     Sincerely,        Guera Uriarte MD

## 2017-11-22 ENCOUNTER — HOSPITAL ENCOUNTER (OUTPATIENT)
Age: 77
Setting detail: OUTPATIENT SURGERY
Discharge: HOME | End: 2017-11-22
Attending: SURGERY | Admitting: SURGERY
Payer: MEDICARE

## 2017-11-22 ENCOUNTER — ANESTHESIA EVENT (OUTPATIENT)
Dept: OPERATING ROOM | Age: 77
End: 2017-11-22
Payer: MEDICARE

## 2017-11-22 ENCOUNTER — ANESTHESIA (OUTPATIENT)
Dept: OPERATING ROOM | Age: 77
End: 2017-11-22
Payer: MEDICARE

## 2017-11-22 VITALS
SYSTOLIC BLOOD PRESSURE: 133 MMHG | RESPIRATION RATE: 13 BRPM | TEMPERATURE: 97.5 F | DIASTOLIC BLOOD PRESSURE: 66 MMHG | OXYGEN SATURATION: 100 %

## 2017-11-22 VITALS
BODY MASS INDEX: 31.4 KG/M2 | HEART RATE: 71 BPM | DIASTOLIC BLOOD PRESSURE: 25 MMHG | RESPIRATION RATE: 16 BRPM | SYSTOLIC BLOOD PRESSURE: 172 MMHG | WEIGHT: 212 LBS | OXYGEN SATURATION: 98 % | HEIGHT: 69 IN | TEMPERATURE: 98 F

## 2017-11-22 PROBLEM — C49.22: Status: ACTIVE | Noted: 2017-11-22

## 2017-11-22 PROCEDURE — 3700000001 HC ADD 15 MINUTES (ANESTHESIA): Performed by: SURGERY

## 2017-11-22 PROCEDURE — 6370000000 HC RX 637 (ALT 250 FOR IP): Performed by: SURGERY

## 2017-11-22 PROCEDURE — 2580000003 HC RX 258: Performed by: SURGERY

## 2017-11-22 PROCEDURE — 6360000002 HC RX W HCPCS: Performed by: NURSE ANESTHETIST, CERTIFIED REGISTERED

## 2017-11-22 PROCEDURE — 7100000010 HC PHASE II RECOVERY - FIRST 15 MIN: Performed by: SURGERY

## 2017-11-22 PROCEDURE — 6360000002 HC RX W HCPCS: Performed by: SURGERY

## 2017-11-22 PROCEDURE — 3600000013 HC SURGERY LEVEL 3 ADDTL 15MIN: Performed by: SURGERY

## 2017-11-22 PROCEDURE — 2500000003 HC RX 250 WO HCPCS: Performed by: NURSE ANESTHETIST, CERTIFIED REGISTERED

## 2017-11-22 PROCEDURE — 88305 TISSUE EXAM BY PATHOLOGIST: CPT

## 2017-11-22 PROCEDURE — 7100000011 HC PHASE II RECOVERY - ADDTL 15 MIN: Performed by: SURGERY

## 2017-11-22 PROCEDURE — 3600000003 HC SURGERY LEVEL 3 BASE: Performed by: SURGERY

## 2017-11-22 PROCEDURE — 3700000000 HC ANESTHESIA ATTENDED CARE: Performed by: SURGERY

## 2017-11-22 DEVICE — GRAFT HUM TISS NEO L7.5IN FORESKIN PROC APLIGRAF: Type: IMPLANTABLE DEVICE | Status: FUNCTIONAL

## 2017-11-22 RX ORDER — ONDANSETRON 2 MG/ML
4 INJECTION INTRAMUSCULAR; INTRAVENOUS EVERY 6 HOURS PRN
Status: DISCONTINUED | OUTPATIENT
Start: 2017-11-22 | End: 2017-11-22 | Stop reason: HOSPADM

## 2017-11-22 RX ORDER — LIDOCAINE HYDROCHLORIDE 10 MG/ML
INJECTION, SOLUTION EPIDURAL; INFILTRATION; INTRACAUDAL; PERINEURAL PRN
Status: DISCONTINUED | OUTPATIENT
Start: 2017-11-22 | End: 2017-11-22 | Stop reason: SDUPTHER

## 2017-11-22 RX ORDER — DEXAMETHASONE SODIUM PHOSPHATE 10 MG/ML
INJECTION INTRAMUSCULAR; INTRAVENOUS PRN
Status: DISCONTINUED | OUTPATIENT
Start: 2017-11-22 | End: 2017-11-22 | Stop reason: SDUPTHER

## 2017-11-22 RX ORDER — MORPHINE SULFATE 4 MG/ML
1 INJECTION, SOLUTION INTRAMUSCULAR; INTRAVENOUS
Status: DISCONTINUED | OUTPATIENT
Start: 2017-11-22 | End: 2017-11-22 | Stop reason: HOSPADM

## 2017-11-22 RX ORDER — SODIUM CHLORIDE 0.9 % (FLUSH) 0.9 %
10 SYRINGE (ML) INJECTION PRN
Status: DISCONTINUED | OUTPATIENT
Start: 2017-11-22 | End: 2017-11-22 | Stop reason: HOSPADM

## 2017-11-22 RX ORDER — SODIUM CHLORIDE 0.9 % (FLUSH) 0.9 %
10 SYRINGE (ML) INJECTION EVERY 12 HOURS SCHEDULED
Status: DISCONTINUED | OUTPATIENT
Start: 2017-11-22 | End: 2017-11-22 | Stop reason: HOSPADM

## 2017-11-22 RX ORDER — HYDROCODONE BITARTRATE AND ACETAMINOPHEN 5; 325 MG/1; MG/1
TABLET ORAL
Qty: 30 TABLET | Refills: 0 | Status: SHIPPED | OUTPATIENT
Start: 2017-11-22 | End: 2017-12-04 | Stop reason: SDUPTHER

## 2017-11-22 RX ORDER — SODIUM CHLORIDE, SODIUM LACTATE, POTASSIUM CHLORIDE, CALCIUM CHLORIDE 600; 310; 30; 20 MG/100ML; MG/100ML; MG/100ML; MG/100ML
INJECTION, SOLUTION INTRAVENOUS CONTINUOUS
Status: DISCONTINUED | OUTPATIENT
Start: 2017-11-22 | End: 2017-11-22 | Stop reason: HOSPADM

## 2017-11-22 RX ORDER — ONDANSETRON 2 MG/ML
INJECTION INTRAMUSCULAR; INTRAVENOUS PRN
Status: DISCONTINUED | OUTPATIENT
Start: 2017-11-22 | End: 2017-11-22 | Stop reason: SDUPTHER

## 2017-11-22 RX ORDER — FENTANYL CITRATE 50 UG/ML
INJECTION, SOLUTION INTRAMUSCULAR; INTRAVENOUS PRN
Status: DISCONTINUED | OUTPATIENT
Start: 2017-11-22 | End: 2017-11-22 | Stop reason: SDUPTHER

## 2017-11-22 RX ORDER — KETOROLAC TROMETHAMINE 30 MG/ML
INJECTION, SOLUTION INTRAMUSCULAR; INTRAVENOUS PRN
Status: DISCONTINUED | OUTPATIENT
Start: 2017-11-22 | End: 2017-11-22 | Stop reason: SDUPTHER

## 2017-11-22 RX ORDER — MIDAZOLAM HYDROCHLORIDE 1 MG/ML
INJECTION INTRAMUSCULAR; INTRAVENOUS PRN
Status: DISCONTINUED | OUTPATIENT
Start: 2017-11-22 | End: 2017-11-22 | Stop reason: SDUPTHER

## 2017-11-22 RX ORDER — PROPOFOL 10 MG/ML
INJECTION, EMULSION INTRAVENOUS PRN
Status: DISCONTINUED | OUTPATIENT
Start: 2017-11-22 | End: 2017-11-22 | Stop reason: SDUPTHER

## 2017-11-22 RX ORDER — ACETAMINOPHEN 325 MG/1
650 TABLET ORAL EVERY 4 HOURS PRN
Status: DISCONTINUED | OUTPATIENT
Start: 2017-11-22 | End: 2017-11-22 | Stop reason: HOSPADM

## 2017-11-22 RX ORDER — EPHEDRINE SULFATE 50 MG/ML
INJECTION, SOLUTION INTRAVENOUS PRN
Status: DISCONTINUED | OUTPATIENT
Start: 2017-11-22 | End: 2017-11-22 | Stop reason: SDUPTHER

## 2017-11-22 RX ORDER — HYDROCODONE BITARTRATE AND ACETAMINOPHEN 5; 325 MG/1; MG/1
2 TABLET ORAL EVERY 6 HOURS PRN
Status: DISCONTINUED | OUTPATIENT
Start: 2017-11-22 | End: 2017-11-22 | Stop reason: HOSPADM

## 2017-11-22 RX ORDER — CEPHALEXIN 500 MG/1
500 CAPSULE ORAL 4 TIMES DAILY
Qty: 28 CAPSULE | Refills: 0 | Status: SHIPPED | OUTPATIENT
Start: 2017-11-22 | End: 2017-11-27 | Stop reason: SDUPTHER

## 2017-11-22 RX ADMIN — LIDOCAINE HYDROCHLORIDE 40 MG: 10 INJECTION, SOLUTION EPIDURAL; INFILTRATION; INTRACAUDAL; PERINEURAL at 13:10

## 2017-11-22 RX ADMIN — ONDANSETRON 4 MG: 2 INJECTION INTRAMUSCULAR; INTRAVENOUS at 13:20

## 2017-11-22 RX ADMIN — DEXAMETHASONE SODIUM PHOSPHATE 10 MG: 10 INJECTION, SOLUTION INTRAMUSCULAR; INTRAVENOUS at 13:20

## 2017-11-22 RX ADMIN — FENTANYL CITRATE 50 MCG: 50 INJECTION INTRAMUSCULAR; INTRAVENOUS at 13:55

## 2017-11-22 RX ADMIN — FENTANYL CITRATE 50 MCG: 50 INJECTION INTRAMUSCULAR; INTRAVENOUS at 13:40

## 2017-11-22 RX ADMIN — HYDROCODONE BITARTRATE AND ACETAMINOPHEN 2 TABLET: 5; 325 TABLET ORAL at 15:29

## 2017-11-22 RX ADMIN — EPHEDRINE SULFATE 10 MG: 50 INJECTION, SOLUTION INTRAVENOUS at 13:15

## 2017-11-22 RX ADMIN — SODIUM CHLORIDE, POTASSIUM CHLORIDE, SODIUM LACTATE AND CALCIUM CHLORIDE: 600; 310; 30; 20 INJECTION, SOLUTION INTRAVENOUS at 11:58

## 2017-11-22 RX ADMIN — PROPOFOL 170 MG: 10 INJECTION, EMULSION INTRAVENOUS at 13:10

## 2017-11-22 RX ADMIN — SODIUM CHLORIDE, POTASSIUM CHLORIDE, SODIUM LACTATE AND CALCIUM CHLORIDE: 600; 310; 30; 20 INJECTION, SOLUTION INTRAVENOUS at 14:05

## 2017-11-22 RX ADMIN — EPHEDRINE SULFATE 10 MG: 50 INJECTION, SOLUTION INTRAVENOUS at 13:20

## 2017-11-22 RX ADMIN — CEFAZOLIN SODIUM 2 G: 2 SOLUTION INTRAVENOUS at 13:04

## 2017-11-22 RX ADMIN — KETOROLAC TROMETHAMINE 30 MG: 30 INJECTION, SOLUTION INTRAMUSCULAR at 14:10

## 2017-11-22 RX ADMIN — FENTANYL CITRATE 50 MCG: 50 INJECTION INTRAMUSCULAR; INTRAVENOUS at 13:10

## 2017-11-22 RX ADMIN — FENTANYL CITRATE 50 MCG: 50 INJECTION INTRAMUSCULAR; INTRAVENOUS at 13:20

## 2017-11-22 RX ADMIN — MIDAZOLAM 2 MG: 1 INJECTION INTRAMUSCULAR; INTRAVENOUS at 12:55

## 2017-11-22 ASSESSMENT — PAIN - FUNCTIONAL ASSESSMENT: PAIN_FUNCTIONAL_ASSESSMENT: 0-10

## 2017-11-22 ASSESSMENT — PAIN DESCRIPTION - DESCRIPTORS
DESCRIPTORS: ACHING
DESCRIPTORS: ACHING

## 2017-11-22 ASSESSMENT — PAIN DESCRIPTION - ORIENTATION: ORIENTATION: LEFT

## 2017-11-22 ASSESSMENT — PAIN SCALES - GENERAL
PAINLEVEL_OUTOF10: 2
PAINLEVEL_OUTOF10: 5
PAINLEVEL_OUTOF10: 5

## 2017-11-22 ASSESSMENT — ENCOUNTER SYMPTOMS
COUGH: 0
BACK PAIN: 0
BLOOD IN STOOL: 0
SHORTNESS OF BREATH: 0
CHOKING: 0
NAUSEA: 0
TROUBLE SWALLOWING: 0
ABDOMINAL PAIN: 0
SORE THROAT: 0
VOMITING: 0

## 2017-11-22 ASSESSMENT — COPD QUESTIONNAIRES: CAT_SEVERITY: MODERATE

## 2017-11-22 ASSESSMENT — PAIN DESCRIPTION - PAIN TYPE
TYPE: SURGICAL PAIN
TYPE: SURGICAL PAIN

## 2017-11-22 ASSESSMENT — PAIN DESCRIPTION - LOCATION: LOCATION: LEG

## 2017-11-22 NOTE — COMMUNICATION BODY
Assessment:     1. Leiomyosarcoma of leg, left ([address]           Plan: Will proceed with re excision of L lower leg leiomyosarcoma biopsy site for 2cm clear margins. This will require skin graft or allograft. Risks, benefits, alternatives thoroughly reviewed and accepted by Mr Cleveland, including bleeding, infection, graft failure, etc.  Will also refer to Dr Deepthi Tavarez.

## 2017-11-22 NOTE — PROGRESS NOTES

## 2017-11-22 NOTE — PATIENT INSTRUCTIONS
Patient Education        Skin Grafts: What to Expect at Home  Your Recovery  Skin grafts are thin sheets of healthy skin removed from one part of the body (donor site) and put on another part. Grafts can be used to treat skin damaged by burns, infection, or other injury. If possible, a doctor takes healthy skin from areas that are usually covered by clothes or are not easily seen. You will have a bandage over the skin graft. The area may be sore for 1 to 2 weeks. Keep the area of the skin graft dry while it heals, unless your doctor gives you other instructions. If possible, prop up the area of your body that has the skin graft. Keeping it raised will reduce swelling and fluid buildup, which can cause problems with the graft. You also will have a bandage on the donor site. Try to avoid getting sunlight on the skin graft for several months. This helps to prevent a permanent change of color in the grafted skin. Also, avoid exercise that stretches the skin graft for at least 3 weeks after surgery, unless your doctor gives you other instructions. If the graft was placed on your legs, arms, hands, or feet, you may need physical therapy to prevent scar tissue from limiting your movement. This therapy is very important. It may involve wearing splints and doing stretches and range-of-motion exercises. These may be painful, but they help you to heal properly. It may take months for you to regain some feeling in the grafted area. The feeling will be different than it was before your injury. You may not have sweat glands in the skin graft area. If the grafted area is large, this may make it hard for the area to cool off when you are hot. The grafted area may not have oil glands. This can make the skin graft dry and flaky. After your graft heals, you may need to use lotion to keep the skin moist. The skin graft may not grow hair. Sometimes skin grafts do not \"take\" or survive after being transferred.  If the skin graft to your doctor. He or she will tell you if and when to start taking those medicines again. Make sure that you understand exactly what your doctor wants you to do. · Be safe with medicines. Take pain medicines exactly as directed. ¨ If the doctor gave you a prescription medicine for pain, take it as prescribed. ¨ If you are not taking a prescription pain medicine, ask your doctor if you can take an over-the-counter medicine. · If your doctor prescribed antibiotics, take them as directed. Do not stop taking them just because you feel better. You need to take the full course of antibiotics. · If you think your pain medicine is making you sick to your stomach:  ¨ Take your medicine after meals (unless your doctor has told you not to). ¨ Ask your doctor for a different pain medicine. Skin graft and donor site care  · Leave the bandages on the skin graft and donor site until your doctor says you can take them off. You probably will receive instructions on how to change the bandages. Follow these instructions closely. · Keep the area clean and dry, unless your doctor tells you differently. · Do not rub the skin graft for 3 to 4 weeks. Follow-up care is a key part of your treatment and safety. Be sure to make and go to all appointments, and call your doctor if you are having problems. It's also a good idea to know your test results and keep a list of the medicines you take. When should you call for help? Call 911 anytime you think you may need emergency care. For example, call if:  · You passed out (lost consciousness). · You have severe trouble breathing. · You have sudden chest pain and shortness of breath, or you cough up blood. Call your doctor now or seek immediate medical care if:  · You have pain that does not get better after you take pain medicine. · You have loose stitches, or your skin graft comes loose. · You have bleeding from the skin graft.   · You have symptoms of a blood clot in your leg (called a deep vein thrombosis), such as:  ¨ Pain in the calf, back of the knee, thigh, or groin. ¨ Redness and swelling in your leg or groin. · You have signs of infection, such as:  ¨ Increased pain, swelling, warmth, or redness. ¨ Red streaks leading from the incision. ¨ Pus draining from the incision. ¨ A fever. · You are sick to your stomach or cannot keep fluids down. Watch closely for changes in your health, and be sure to contact your doctor if:  · You do not get better as expected. Where can you learn more? Go to https://[email or web address].[email or web address]. org and sign in to your [employer] account. Enter V389 in the [employer] box to learn more about \"Skin Grafts: What to Expect at Home. \"     If you do not have an account, please click on the \"Sign Up Now\" link. Current as of: October 13, 2016  Content Version: 11.3  © 3750-4322 [employer]. Care instructions adapted under license by [facility] ([facility]). If you have questions about a medical condition or this instruction, always ask your healthcare professional. [place] [address] any warranty or liability for your use of this information.

## 2017-11-22 NOTE — PROGRESS NOTES
Air walker boot on and patient complains that it hurts too much on the back of his calf. Removed. Up to bathroom with no weight bearing on left leg with walker. Voids qs. Does well. Says pain is better and is ready to go home.

## 2017-11-22 NOTE — ANESTHESIA PRE PROCEDURE
Department of Anesthesiology  Preprocedure Note       Name:  Jewel Hernandez   Age:  68 y.o.  :  1940                                          MRN:  [id number]         Date:  2017      Surgeon: Karli Rosales):  Ralf Patricio MD    Procedure: Procedure(s):  SKIN GRAFT SPLIT THICKNESS    Medications prior to admission:   Prior to Admission medications    Medication Sig Start Date End Date Taking? Authorizing Provider   lisinopril (ZESTRIL) 30 MG tablet Take 1 tablet by mouth daily 10/9/17  Yes [facility], DO   lovastatin (MEVACOR) 40 MG tablet Take 1 tablet by mouth nightly 10/9/17  Yes [facility], DO   propranolol (INDERAL LA) 80 MG extended release capsule Take 1 capsule by mouth daily 10/9/17  Yes [facility], DO   sertraline (ZOLOFT) 50 MG tablet Take 1 tablet by mouth daily 10/9/17  Yes [facility], DO   amLODIPine (NORVASC) 5 MG tablet Take 1 tablet by mouth 2 times daily 10/9/17  Yes [facility], DO   Multiple Vitamin (MULTI VITAMIN DAILY PO) Take by mouth daily   Yes Historical Provider, MD   Psyllium (METAMUCIL FIBER SINGLES PO) Take  by mouth daily. Yes Historical Provider, MD   Docusate Calcium (STOOL SOFTENER PO) Take  by mouth daily. Yes Historical Provider, MD   HYDROcodone-acetaminophen (NORCO) 5-325 MG per tablet Take 1 or 2 tablets po every 6 hours as needed for pain. . 17   Ralf Patricio MD   tiotropium (Anne Smiles) 18 MCG inhalation capsule Inhale 1 capsule into the lungs daily 10/9/17   [facility], DO   albuterol (PROVENTIL) (2.5 MG/3ML) 0.083% nebulizer solution Take 3 mLs by nebulization every 6 hours as needed for Wheezing 17   Emil Land MD   albuterol sulfate HFA (PROAIR HFA) 108 (90 BASE) MCG/ACT inhaler Inhale 2 puffs into the lungs every 6 hours as needed for Wheezing 3/30/16   [facility], DO   aspirin 81 MG EC tablet Take 81 mg by mouth daily.       Historical Provider, MD       Current medications:    Current 3 Encounters:   11/22/17 (!) 205/90   11/13/17 (!) 98/49   11/13/17 132/63       NPO Status: Time of last liquid consumption: 2200                        Time of last solid consumption: 2200                        Date of last liquid consumption: 11/21/17                        Date of last solid food consumption: 11/21/17    BMI:   Wt Readings from Last 3 Encounters:   11/22/17 212 lb (96.2 kg)   11/13/17 218 lb 9.6 oz (99.2 kg)   10/25/17 216 lb (98 kg)     Body mass index is 31.31 kg/m². CBC:   Lab Results   Component Value Date    WBC 8.3 05/05/2017    RBC 4.84 05/05/2017    HGB 15.0 05/05/2017    HCT 45.7 05/05/2017    MCV 94.3 05/05/2017    RDW 12.5 05/05/2017     05/05/2017       CMP:   Lab Results   Component Value Date     05/05/2017    K 5.0 05/05/2017     05/05/2017    CO2 28 05/05/2017    BUN 19 05/05/2017    CREATININE 0.94 05/05/2017    GFRAA >60 05/05/2017    LABGLOM >60 05/05/2017    GLUCOSE 118 05/05/2017    PROT 7.7 05/05/2017    CALCIUM 9.7 05/05/2017    BILITOT 0.51 05/05/2017    ALKPHOS 74 05/05/2017    AST 20 05/05/2017    ALT 19 05/05/2017       POC Tests: No results for input(s): POCGLU, POCNA, POCK, POCCL, POCBUN, POCHEMO, POCHCT in the last 72 hours.     Coags: No results found for: PROTIME, INR, APTT    HCG (If Applicable): No results found for: PREGTESTUR, PREGSERUM, HCG, HCGQUANT     ABGs: No results found for: PHART, PO2ART, BTM4CLD, QWZ5UBT, BEART, C2ZOBYJH     Type & Screen (If Applicable):  No results found for: LABABO, [address]    Anesthesia Evaluation  Patient summary reviewed and Nursing notes reviewed no history of anesthetic complications:   Airway: Mallampati: II  TM distance: >3 FB   Neck ROM: full  Mouth opening: > = 3 FB Dental:    (+) edentulous      Pulmonary:normal exam    (+) COPD: moderate,                             Cardiovascular:    (+) hypertension:, angina: no interval change,       ECG reviewed        Stress test reviewed  Cleared by cardiology Neuro/Psych:   (+) headaches: migraine headaches, psychiatric history:            GI/Hepatic/Renal: Neg GI/Hepatic/Renal ROS            Endo/Other: Negative Endo/Other ROS                    Abdominal:           Vascular:                                      Anesthesia Plan      general and MAC     ASA 3           MIPS: Postoperative opioids intended and Prophylactic antiemetics administered. Anesthetic plan and risks discussed with patient. Plan discussed with attending.                 Patricia Geller CRNA   11/22/2017

## 2017-11-22 NOTE — PROGRESS NOTES
tablet Take 1 tablet by mouth daily 90 tablet 3    lovastatin (MEVACOR) 40 MG tablet Take 1 tablet by mouth nightly 90 tablet 3    propranolol (INDERAL LA) 80 MG extended release capsule Take 1 capsule by mouth daily 90 capsule 3    sertraline (ZOLOFT) 50 MG tablet Take 1 tablet by mouth daily 90 tablet 3    amLODIPine (NORVASC) 5 MG tablet Take 1 tablet by mouth 2 times daily 180 tablet 3    tiotropium (SPIRIVA HANDIHALER) 18 MCG inhalation capsule Inhale 1 capsule into the lungs daily 90 capsule 3    Multiple Vitamin (MULTI VITAMIN DAILY PO) Take by mouth daily      albuterol (PROVENTIL) (2.5 MG/3ML) 0.083% nebulizer solution Take 3 mLs by nebulization every 6 hours as needed for Wheezing 120 each 11    ranitidine (ZANTAC) 150 MG tablet Take 150 mg by mouth daily as needed for Heartburn      albuterol sulfate HFA (PROAIR HFA) 108 (90 BASE) MCG/ACT inhaler Inhale 2 puffs into the lungs every 6 hours as needed for Wheezing 1 Inhaler 3    Psyllium (METAMUCIL FIBER SINGLES PO) Take  by mouth daily.  Docusate Calcium (STOOL SOFTENER PO) Take  by mouth daily.  aspirin 81 MG EC tablet Take 81 mg by mouth daily. No current facility-administered medications for this visit. Social History     Social History    Marital status:      Spouse name: N/A    Number of children: N/A    Years of education: N/A     Social History Main Topics    Smoking status: Former Smoker     Quit date: 8/15/2003    Smokeless tobacco: Never Used    Alcohol use 0.6 oz/week     1 Cans of beer per week      Comment: occasional    Drug use: No    Sexual activity: Not Currently     Other Topics Concern    None     Social History Narrative    None       Objective:   Physical Exam   Constitutional: He is oriented to person, place, and time. He appears well-developed and well-nourished. No distress. HENT:   Head: Normocephalic and atraumatic.    Mouth/Throat: Oropharynx is clear and moist. No oropharyngeal exudate. Eyes: Conjunctivae and EOM are normal. Pupils are equal, round, and reactive to light. No scleral icterus. Neck: Normal range of motion. Neck supple. No JVD present. No tracheal deviation present. Cardiovascular: Normal rate and regular rhythm. Pulmonary/Chest: Effort normal and breath sounds normal. No respiratory distress. He exhibits no tenderness. Abdominal: Soft. Bowel sounds are normal. He exhibits no distension and no mass. There is no tenderness. There is no rebound and no guarding. Lymphadenopathy:     He has no cervical adenopathy. Neurological: He is alert and oriented to person, place, and time. No cranial nerve deficit. Skin: Skin is warm and dry. No rash noted. No erythema. Left leg wound is C/D/I. Minimal edema. Psychiatric: He has a normal mood and affect. His behavior is normal. Judgment and thought content normal.   Nursing note and vitals reviewed. 11/15/2017  8:32 AM - Issa, M[facility]pn Incoming Lab Results From [facility]     Component Results     Component Collected Lab   Surgical Pathology Report 11/13/2017  1:59  [place] [id number] [id number]   [address], [facility] [address]. 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        BANDAR Baeza   **Electronically Signed Out**         ajb/11/15/2017       Clinical Information   Pre-op Diagnosis:  LEFT LEG LESION   Operative Findings:  SUB-Q LESION  LEFT LOWER LEG   Operation Performed:  LEG LESION BIOPSY, EXCISION     Source of Specimen   1: SUB-Q LESION LEFT

## 2017-11-23 NOTE — OP NOTE
ambulation, wound protection at all times. No dressing changes  prior to follow up. FOLLOWUP:  1. Follow up with me in 1 week for wound check and dressing change. 2.  Follow up with Dr. Sofia Barbour at his next scheduled appointment. ELADIO Robins    D: 11/22/2017 14:48:13       T: 11/22/2017 21:21:48     [email or web address]  Job#: [id number]     Doc#: [id number]    CC:  Mary Alice Roberts

## 2017-11-27 ENCOUNTER — OFFICE VISIT (OUTPATIENT)
Dept: SURGERY | Age: 77
End: 2017-11-27

## 2017-11-27 DIAGNOSIS — C49.22 LEIOMYOSARCOMA OF LEG, LEFT (HCC): ICD-10-CM

## 2017-11-27 DIAGNOSIS — Z09 POSTOP CHECK: Primary | ICD-10-CM

## 2017-11-27 LAB — SURGICAL PATHOLOGY REPORT: NORMAL

## 2017-11-27 PROCEDURE — 99024 POSTOP FOLLOW-UP VISIT: CPT | Performed by: SURGERY

## 2017-11-27 RX ORDER — CEPHALEXIN 500 MG/1
500 CAPSULE ORAL 4 TIMES DAILY
Qty: 28 CAPSULE | Refills: 0 | Status: SHIPPED | OUTPATIENT
Start: 2017-11-27 | End: 2017-12-04

## 2017-11-27 RX ORDER — HYDROCODONE BITARTRATE AND ACETAMINOPHEN 5; 325 MG/1; MG/1
TABLET ORAL
Qty: 20 TABLET | Refills: 0 | OUTPATIENT
Start: 2017-11-27 | End: 2017-12-04 | Stop reason: SDUPTHER

## 2017-11-27 NOTE — LETTER
[address] [place]   [address] [address]  Phone: [phone]  Fax: [phone]    Andre Preciado MD        November 27, 2017     Amanda Hurst DO  [address] [address]    Patient: Sabrina Cleveland  MR Number: [id number]  YOB: 1940  Date of Visit: 11/27/2017    Dear Dr. Amanda Hurst: Thank you for the request for consultation for [facility] to me for the evaluation of L leg leiomyosarcoma. Below are the relevant portions of my assessment and plan of care. Assessment:     1. Postop check  HYDROcodone-acetaminophen (NORCO) 5-325 MG per tablet    enoxaparin (LOVENOX) 40 MG/0.4ML injection    cephALEXin (KEFLEX) 500 MG capsule   2. Leiomyosarcoma of leg, left [facility])           Plan:     Mr Cleveland is doing very well. Pathology showing wide, clear margins without any residual malignancy has been reviewed with patient and his wife. Apligraf granulating well. Dressing changed, Adaptec, Bacitracin, Kerlix/saline, ACE. Will plan next dressing change in one week. Continue Keflex, Norco, Lovenox, leg elevation. Will refer to Dr Rach Eden for review of pathology. If you have questions, please do not hesitate to call me. I look forward to following Langston Earthly along with you.     Sincerely,        Andre Preciado MD

## 2017-11-27 NOTE — PROGRESS NOTES
(METAMUCIL FIBER SINGLES PO) Take  by mouth daily.  Docusate Calcium (STOOL SOFTENER PO) Take  by mouth daily.  aspirin 81 MG EC tablet Take 81 mg by mouth daily.  HYDROcodone-acetaminophen (NORCO) 5-325 MG per tablet Take 1 or 2 tablets po every 6 hours as needed for pain. . 30 tablet 0    cephALEXin (KEFLEX) 500 MG capsule Take 1 capsule by mouth 4 times daily for 7 days 28 capsule 0    enoxaparin (LOVENOX) 40 MG/0.4ML injection Inject 0.4 mLs into the skin daily for 14 days 1 Syringe 1     No current facility-administered medications for this visit. Social History     Social History    Marital status:      Spouse name: N/A    Number of children: N/A    Years of education: N/A     Social History Main Topics    Smoking status: Former Smoker     Quit date: 8/15/2003    Smokeless tobacco: Never Used    Alcohol use 0.6 oz/week     1 Cans of beer per week      Comment: occasional    Drug use: No    Sexual activity: Not Currently     Other Topics Concern    Not on file     Social History Narrative    No narrative on file       Objective:   Physical Exam     Wound C/D/I. Sutures in place. Minimal erythema. 11/15/2017  8:32 AM - Reggie Parsons Incoming Lab Results From [facility]     Component Results     Component Collected Lab   Surgical Pathology Report 11/13/2017  1:59  [place] [phone]   [address], [facility] [address]. 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        BANDAR Haas   **Electronically Signed Out**         ajb/11/15/2017       Clinical Information   Pre-op Diagnosis:  LEFT LEG LESION   Operative Findings:  SUB-Q LESION  LEFT LOWER LEG   Operation Performed:  LEG LESION BIOPSY, EXCISION     Source of Specimen   1: SUB-Q LESION LEFT LOWER LEG (2 CONTAINERS)     Gross Description   \"Heidi ZAFAR" Received in unlabeled containers, one fresh and one   formalin, is a 2.8 x 2.8 x 2.5 cm trabecular rubbery tan nodule.  The   nodule displays areas of hemorrhage and necrosis.   A portion is frozen   for possible molecular studies.  Representative 4cs including   hemorrhage and necrosis.  tm       Microscopic Description             Resection;       Procedure:                       Resection, intralesional   Tumor Site:                      Left lower leg   Tumor Size:                     2.8 cm maximum dimension   Macroscopic Extent of Tumor:   Superficial; subcutaneous/suprafascial   Histologic Type:                 Leiomyosarcoma   Mitotic Rate:                    10 mitotic division figures/10 high   power fields ( score 2)   Necrosis:                        Macroscopic necrosis, <50% (score 1)   Histologic Grade:                Grade 2 (differentiation score 2 +   mitotic rate score 2 + necrosis score 1 =  total score 5)   Margins:                         A small rim of benign adipose tissue,   fibroconnective tissue and neural tissue surrounds most of the tumor,   however, focally the tumor extends to the margins   Lymph Nodes:   - Number Examined:             Not submitted   - Number Involved:              N/A         Pathologic Stage:     pT1a  pNX         Ancillary Studies:   -Immunohistochemistry:           Immunostains CD34, , smooth   muscle actin, triple melanoma cocktail, S100 and desmin are performed,   with appropriate reactive controls.  The tumor is positive for smooth   muscle actin and desmin.   -Cytogenetics:                     Not submitted   -Molecular Pathology:             Not performed Preresection Treatment:           No previous section treatment   Treatment Effect:                 N/A         [CAP version October 2013]            Assessment:      1. Leiomyosarcoma of leg, left [facility])           Plan:      Pathology reviewed with Mr Cleveland. Will plan re excision of L lower leg biopsy site to establish adequate margins. Will likely require skin grafting for closure. Oncology referral.  Patient and wife convey understanding and will follow up with me in one week to plan the procedure.

## 2017-12-04 ENCOUNTER — OFFICE VISIT (OUTPATIENT)
Dept: SURGERY | Age: 77
End: 2017-12-04

## 2017-12-04 VITALS — HEIGHT: 69 IN | BODY MASS INDEX: 31.4 KG/M2 | WEIGHT: 212 LBS

## 2017-12-04 DIAGNOSIS — Z51.89 VISIT FOR WOUND CHECK: Primary | ICD-10-CM

## 2017-12-04 PROCEDURE — 99024 POSTOP FOLLOW-UP VISIT: CPT | Performed by: SURGERY

## 2017-12-04 NOTE — PROGRESS NOTES
Nurse Visit     Date of service: 2017    Lala Mas Megha   : 1940    Patient's PCP is: Kylee Alex DO      Subjective     [address] is a 54-year-old male who returns for wound check and dressing change per Dr Pradeep Talbot. Status post re-excision of left lower leg leiomyosarcoma on 17. Patient without complaints. No signs or symptoms of infection. Wound photo available in media. Wound Care Documentation      Wound Bed status:   Non-granular 0%  Necrotic-0%     Slough-0%    Granular-100%   Epithelialization-0%   Hypergranular-0%   Foreign Body-no        Wound edges: (a minimum of 4 cm around perimeter of wound )   Defined-yes    Calloused-no    Attached-yes    Rolled under (epibole)-no    Macerated-no    Odor:yes    Culture taken: no    Debridement: no    Today's dressing: Apaptec applied, followed by Bacitracin and covered with saline moistened gauze 4 x 4's. Left leg wrapped with Kerlix bandage roll and then ACE wrap. Follow-up with Dr Pradeep Talbot has been scheduled for 17.       NURSE: Lydia Stevenson LPN

## 2017-12-06 ENCOUNTER — TELEPHONE (OUTPATIENT)
Dept: FAMILY MEDICINE CLINIC | Age: 77
End: 2017-12-06

## 2017-12-07 DIAGNOSIS — C49.22 LEIOMYOSARCOMA OF LEG, LEFT (HCC): Primary | ICD-10-CM

## 2017-12-12 ENCOUNTER — OFFICE VISIT (OUTPATIENT)
Dept: SURGERY | Age: 77
End: 2017-12-12

## 2017-12-12 VITALS — HEIGHT: 69 IN | BODY MASS INDEX: 31.7 KG/M2 | WEIGHT: 214 LBS

## 2017-12-12 DIAGNOSIS — C49.22 LEIOMYOSARCOMA OF LEG, LEFT (HCC): Primary | ICD-10-CM

## 2017-12-12 DIAGNOSIS — Z51.89 VISIT FOR WOUND CHECK: ICD-10-CM

## 2017-12-12 PROCEDURE — 99024 POSTOP FOLLOW-UP VISIT: CPT | Performed by: SURGERY

## 2017-12-19 ENCOUNTER — OFFICE VISIT (OUTPATIENT)
Dept: SURGERY | Age: 77
End: 2017-12-19

## 2017-12-19 VITALS — WEIGHT: 214 LBS | BODY MASS INDEX: 31.7 KG/M2 | RESPIRATION RATE: 18 BRPM | HEIGHT: 69 IN | HEART RATE: 68 BPM

## 2017-12-19 DIAGNOSIS — C49.22 LEIOMYOSARCOMA OF LEG, LEFT (HCC): ICD-10-CM

## 2017-12-19 DIAGNOSIS — Z51.89 VISIT FOR WOUND CHECK: Primary | ICD-10-CM

## 2017-12-19 PROCEDURE — 99024 POSTOP FOLLOW-UP VISIT: CPT | Performed by: SURGERY

## 2017-12-19 RX ORDER — COVID-19 ANTIGEN TEST
KIT MISCELLANEOUS EVERY 6 HOURS PRN
COMMUNITY
End: 2018-05-14 | Stop reason: CLARIF

## 2018-01-03 NOTE — PROGRESS NOTES
Subjective:      Patient ID: Jaimie Pinto is a 68 y.o. male. HPI     Mr Bryan Harper returns for follow up dressing change after re excision L lower leg leiomyosarcoma biopsy site Nov 22, 2017, with Apligraf application 3:1 split thickness. Doing well. No fevers nor chills. No pain. Review of Systems     Past Medical History:   Diagnosis Date    Cancer [facility])     left lower leg    COPD (chronic obstructive pulmonary disease) ([address]     Hyperlipidemia     Hypertension     Migraine        Past Surgical History:   Procedure Laterality Date    EXCISION / BIOPSY SKIN LESION OF LEG Left 11/13/2017    LEG LESION BIOPSY EXCISION-EXCISION LEFT [facility] / BIOPSY SKIN LESION OF LEG  11/22/2017    LEG LESION BIOPSY EXCISION performed by Alyssa Perez MD at [address]      SKIN GRAFT Left 11/22/2017    SKIN GRAFT SPLIT THICKNESS [facility]         No family history on file.     Allergies:  See list    Current Outpatient Prescriptions   Medication Sig Dispense Refill    lisinopril (ZESTRIL) 30 MG tablet Take 1 tablet by mouth daily 90 tablet 3    lovastatin (MEVACOR) 40 MG tablet Take 1 tablet by mouth nightly 90 tablet 3    propranolol (INDERAL LA) 80 MG extended release capsule Take 1 capsule by mouth daily 90 capsule 3    sertraline (ZOLOFT) 50 MG tablet Take 1 tablet by mouth daily 90 tablet 3    amLODIPine (NORVASC) 5 MG tablet Take 1 tablet by mouth 2 times daily 180 tablet 3    tiotropium (SPIRIVA HANDIHALER) 18 MCG inhalation capsule Inhale 1 capsule into the lungs daily 90 capsule 3    Multiple Vitamin (MULTI VITAMIN DAILY PO) Take by mouth daily      albuterol (PROVENTIL) (2.5 MG/3ML) 0.083% nebulizer solution Take 3 mLs by nebulization every 6 hours as needed for Wheezing 120 each 11    albuterol sulfate HFA (PROAIR HFA) 108 (90 BASE) MCG/ACT inhaler

## 2018-01-17 ENCOUNTER — ANESTHESIA EVENT (OUTPATIENT)
Dept: OPERATING ROOM | Age: 78
End: 2018-01-17
Payer: MEDICARE

## 2018-01-24 ENCOUNTER — HOSPITAL ENCOUNTER (OUTPATIENT)
Age: 78
Setting detail: OUTPATIENT SURGERY
Discharge: HOME | End: 2018-01-24
Attending: SURGERY | Admitting: SURGERY
Payer: MEDICARE

## 2018-01-24 ENCOUNTER — ANESTHESIA (OUTPATIENT)
Dept: OPERATING ROOM | Age: 78
End: 2018-01-24
Payer: MEDICARE

## 2018-01-24 VITALS
DIASTOLIC BLOOD PRESSURE: 68 MMHG | OXYGEN SATURATION: 100 % | RESPIRATION RATE: 1 BRPM | SYSTOLIC BLOOD PRESSURE: 128 MMHG

## 2018-01-24 VITALS
WEIGHT: 210 LBS | TEMPERATURE: 98.7 F | DIASTOLIC BLOOD PRESSURE: 68 MMHG | SYSTOLIC BLOOD PRESSURE: 162 MMHG | OXYGEN SATURATION: 99 % | BODY MASS INDEX: 29.4 KG/M2 | HEIGHT: 71 IN | RESPIRATION RATE: 16 BRPM | HEART RATE: 65 BPM

## 2018-01-24 DIAGNOSIS — S81.802D OPEN WOUND OF LEFT LOWER LEG, SUBSEQUENT ENCOUNTER: Primary | ICD-10-CM

## 2018-01-24 PROBLEM — S81.802A OPEN WOUND OF LEFT LOWER LEG: Status: ACTIVE | Noted: 2018-01-24

## 2018-01-24 PROCEDURE — 2500000003 HC RX 250 WO HCPCS: Performed by: SURGERY

## 2018-01-24 PROCEDURE — 3700000000 HC ANESTHESIA ATTENDED CARE: Performed by: SURGERY

## 2018-01-24 PROCEDURE — 3700000001 HC ADD 15 MINUTES (ANESTHESIA): Performed by: SURGERY

## 2018-01-24 PROCEDURE — 6360000002 HC RX W HCPCS: Performed by: SURGERY

## 2018-01-24 PROCEDURE — 97597 DBRDMT OPN WND 1ST 20 CM/<: CPT | Performed by: SURGERY

## 2018-01-24 PROCEDURE — 15271 SKIN SUB GRAFT TRNK/ARM/LEG: CPT | Performed by: SURGERY

## 2018-01-24 PROCEDURE — 3600000012 HC SURGERY LEVEL 2 ADDTL 15MIN: Performed by: SURGERY

## 2018-01-24 PROCEDURE — 2500000003 HC RX 250 WO HCPCS: Performed by: NURSE ANESTHETIST, CERTIFIED REGISTERED

## 2018-01-24 PROCEDURE — 2580000003 HC RX 258: Performed by: SURGERY

## 2018-01-24 PROCEDURE — 6360000002 HC RX W HCPCS: Performed by: NURSE ANESTHETIST, CERTIFIED REGISTERED

## 2018-01-24 PROCEDURE — 3600000002 HC SURGERY LEVEL 2 BASE: Performed by: SURGERY

## 2018-01-24 PROCEDURE — 88304 TISSUE EXAM BY PATHOLOGIST: CPT

## 2018-01-24 PROCEDURE — 7100000010 HC PHASE II RECOVERY - FIRST 15 MIN: Performed by: SURGERY

## 2018-01-24 PROCEDURE — 7100000011 HC PHASE II RECOVERY - ADDTL 15 MIN: Performed by: SURGERY

## 2018-01-24 DEVICE — GRAFT HUM TISS NEO L7.5IN FORESKIN PROC APLIGRAF: Type: IMPLANTABLE DEVICE | Site: LEG | Status: FUNCTIONAL

## 2018-01-24 RX ORDER — SODIUM CHLORIDE 0.9 % (FLUSH) 0.9 %
10 SYRINGE (ML) INJECTION EVERY 12 HOURS SCHEDULED
Status: DISCONTINUED | OUTPATIENT
Start: 2018-01-24 | End: 2018-01-24 | Stop reason: HOSPADM

## 2018-01-24 RX ORDER — LIDOCAINE HYDROCHLORIDE 10 MG/ML
INJECTION, SOLUTION EPIDURAL; INFILTRATION; INTRACAUDAL; PERINEURAL PRN
Status: DISCONTINUED | OUTPATIENT
Start: 2018-01-24 | End: 2018-01-24 | Stop reason: SDUPTHER

## 2018-01-24 RX ORDER — SODIUM CHLORIDE 0.9 % (FLUSH) 0.9 %
10 SYRINGE (ML) INJECTION PRN
Status: DISCONTINUED | OUTPATIENT
Start: 2018-01-24 | End: 2018-01-24 | Stop reason: HOSPADM

## 2018-01-24 RX ORDER — SODIUM CHLORIDE, SODIUM LACTATE, POTASSIUM CHLORIDE, CALCIUM CHLORIDE 600; 310; 30; 20 MG/100ML; MG/100ML; MG/100ML; MG/100ML
INJECTION, SOLUTION INTRAVENOUS CONTINUOUS
Status: DISCONTINUED | OUTPATIENT
Start: 2018-01-24 | End: 2018-01-24 | Stop reason: HOSPADM

## 2018-01-24 RX ORDER — MIDAZOLAM HYDROCHLORIDE 1 MG/ML
INJECTION INTRAMUSCULAR; INTRAVENOUS PRN
Status: DISCONTINUED | OUTPATIENT
Start: 2018-01-24 | End: 2018-01-24 | Stop reason: SDUPTHER

## 2018-01-24 RX ORDER — PROPOFOL 10 MG/ML
INJECTION, EMULSION INTRAVENOUS PRN
Status: DISCONTINUED | OUTPATIENT
Start: 2018-01-24 | End: 2018-01-24 | Stop reason: SDUPTHER

## 2018-01-24 RX ORDER — LIDOCAINE HYDROCHLORIDE AND EPINEPHRINE 10; 10 MG/ML; UG/ML
INJECTION, SOLUTION INFILTRATION; PERINEURAL PRN
Status: DISCONTINUED | OUTPATIENT
Start: 2018-01-24 | End: 2018-01-24 | Stop reason: HOSPADM

## 2018-01-24 RX ORDER — FENTANYL CITRATE 50 UG/ML
INJECTION, SOLUTION INTRAMUSCULAR; INTRAVENOUS PRN
Status: DISCONTINUED | OUTPATIENT
Start: 2018-01-24 | End: 2018-01-24 | Stop reason: SDUPTHER

## 2018-01-24 RX ORDER — ONDANSETRON 2 MG/ML
4 INJECTION INTRAMUSCULAR; INTRAVENOUS EVERY 6 HOURS PRN
Status: DISCONTINUED | OUTPATIENT
Start: 2018-01-24 | End: 2018-01-24 | Stop reason: HOSPADM

## 2018-01-24 RX ORDER — HYDROCODONE BITARTRATE AND ACETAMINOPHEN 5; 325 MG/1; MG/1
TABLET ORAL
Qty: 20 TABLET | Refills: 0 | Status: SHIPPED | OUTPATIENT
Start: 2018-01-24 | End: 2018-01-27

## 2018-01-24 RX ORDER — MORPHINE SULFATE 4 MG/ML
1 INJECTION, SOLUTION INTRAMUSCULAR; INTRAVENOUS
Status: DISCONTINUED | OUTPATIENT
Start: 2018-01-24 | End: 2018-01-24 | Stop reason: HOSPADM

## 2018-01-24 RX ORDER — ACETAMINOPHEN 325 MG/1
650 TABLET ORAL EVERY 4 HOURS PRN
Status: DISCONTINUED | OUTPATIENT
Start: 2018-01-24 | End: 2018-01-24 | Stop reason: HOSPADM

## 2018-01-24 RX ORDER — EPHEDRINE SULFATE 50 MG/ML
INJECTION, SOLUTION INTRAVENOUS PRN
Status: DISCONTINUED | OUTPATIENT
Start: 2018-01-24 | End: 2018-01-24 | Stop reason: SDUPTHER

## 2018-01-24 RX ADMIN — SODIUM CHLORIDE, POTASSIUM CHLORIDE, SODIUM LACTATE AND CALCIUM CHLORIDE: 600; 310; 30; 20 INJECTION, SOLUTION INTRAVENOUS at 07:29

## 2018-01-24 RX ADMIN — SODIUM CHLORIDE, POTASSIUM CHLORIDE, SODIUM LACTATE AND CALCIUM CHLORIDE: 600; 310; 30; 20 INJECTION, SOLUTION INTRAVENOUS at 09:07

## 2018-01-24 RX ADMIN — PROPOFOL 160 MG: 10 INJECTION, EMULSION INTRAVENOUS at 08:41

## 2018-01-24 RX ADMIN — FENTANYL CITRATE 100 MCG: 50 INJECTION, SOLUTION INTRAMUSCULAR; INTRAVENOUS at 08:41

## 2018-01-24 RX ADMIN — LIDOCAINE HYDROCHLORIDE 50 MG: 10 INJECTION, SOLUTION EPIDURAL; INFILTRATION; INTRACAUDAL; PERINEURAL at 08:41

## 2018-01-24 RX ADMIN — EPHEDRINE SULFATE 5 MG: 50 INJECTION, SOLUTION INTRAVENOUS at 08:56

## 2018-01-24 RX ADMIN — EPHEDRINE SULFATE 10 MG: 50 INJECTION, SOLUTION INTRAVENOUS at 09:00

## 2018-01-24 RX ADMIN — CEFAZOLIN SODIUM 2 G: 2 SOLUTION INTRAVENOUS at 08:33

## 2018-01-24 RX ADMIN — MIDAZOLAM HYDROCHLORIDE 2 MG: 2 INJECTION, SOLUTION INTRAMUSCULAR; INTRAVENOUS at 08:41

## 2018-01-24 RX ADMIN — EPHEDRINE SULFATE 5 MG: 50 INJECTION, SOLUTION INTRAVENOUS at 08:54

## 2018-01-24 ASSESSMENT — PAIN - FUNCTIONAL ASSESSMENT: PAIN_FUNCTIONAL_ASSESSMENT: 0-10

## 2018-01-24 ASSESSMENT — ENCOUNTER SYMPTOMS
SORE THROAT: 0
BACK PAIN: 0
NAUSEA: 0
ABDOMINAL PAIN: 0
EYE REDNESS: 0
BLOOD IN STOOL: 0
COUGH: 0

## 2018-01-24 ASSESSMENT — PAIN SCALES - GENERAL: PAINLEVEL_OUTOF10: 0

## 2018-01-24 ASSESSMENT — COPD QUESTIONNAIRES: CAT_SEVERITY: MILD

## 2018-01-24 NOTE — ANESTHESIA PRE PROCEDURE
Route Frequency Provider Last Rate Last Dose    lactated ringers infusion   Intravenous Continuous Seth Kearns  mL/hr at 01/24/18 0729      sodium chloride flush 0.9 % injection 10 mL  10 mL Intravenous 2 times per day Seth Kearns MD        sodium chloride flush 0.9 % injection 10 mL  10 mL Intravenous PRN Seth Kearns MD        ceFAZolin (ANCEF) 2 g in dextrose 3 % 50 mL IVPB (duplex)  2 g Intravenous On Call to [address] Filiberto Lemus MD           Allergies:  No Known Allergies    Problem List:    Patient Active Problem List   Diagnosis Code    Classical migraine G43. 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       Past Medical History:        Diagnosis Date    Cancer ([address]     left lower leg    COPD (chronic obstructive pulmonary disease) (HCC)     Hyperlipidemia     Hypertension     Migraine        Past Surgical History:        Procedure Laterality Date    EXCISION / BIOPSY SKIN LESION OF LEG Left 11/13/2017    LEG LESION BIOPSY EXCISION-EXCISION LEFT LEG LESION performed by Seth Kearns MD at [facility] 430 / BIOPSY SKIN LESION OF LEG  11/22/2017    LEG LESION BIOPSY EXCISION performed by Seth Kearns MD at [address] [place] [address] Rd      SKIN GRAFT Left 11/22/2017    SKIN GRAFT SPLIT THICKNESS performed by Seth Kearns MD at [place] [address]         Social History:    Social History   Substance Use Topics    Smoking status: Former Smoker     Quit date: 8/15/2003    Smokeless tobacco: Never Used    Alcohol use 0.6 oz/week     1 Cans of beer per week      Comment: occasional                                Counseling given: Not Answered      Vital Signs (Current): sounds clear to auscultation  (+) COPD: mild,            Patient did not smoke on day of surgery. Cardiovascular:  Exercise tolerance: good (>4 METS),   (+) hypertension: mild,       NYHA Classification: I  ECG reviewed  Rhythm: regular  Rate: normal      Cleared by cardiology             PE comment: 1st degree AVB / BBB   Neuro/Psych:   (+) : cluster headaches, psychiatric history: stable without treatment            GI/Hepatic/Renal: Neg GI/Hepatic/Renal ROS            Endo/Other: Negative Endo/Other ROS                    Abdominal:       Abdomen: soft. Vascular: negative vascular ROS. Anesthesia Plan      general     ASA 2       Induction: intravenous. MIPS: Postoperative opioids intended and Prophylactic antiemetics administered. Anesthetic plan and risks discussed with patient. Use of blood products discussed with patient whom consented to blood products. Plan discussed with CRNA and attending.                   Sylvia Boyce   1/24/2018

## 2018-01-25 LAB — SURGICAL PATHOLOGY REPORT: NORMAL

## 2018-01-31 ENCOUNTER — ANESTHESIA EVENT (OUTPATIENT)
Dept: OPERATING ROOM | Age: 78
End: 2018-01-31
Payer: MEDICARE

## 2018-02-01 ENCOUNTER — ANESTHESIA (OUTPATIENT)
Dept: OPERATING ROOM | Age: 78
End: 2018-02-01
Payer: MEDICARE

## 2018-02-01 ENCOUNTER — HOSPITAL ENCOUNTER (OUTPATIENT)
Age: 78
Setting detail: OUTPATIENT SURGERY
Discharge: HOME | End: 2018-02-01
Attending: SURGERY | Admitting: SURGERY
Payer: MEDICARE

## 2018-02-01 VITALS
RESPIRATION RATE: 18 BRPM | DIASTOLIC BLOOD PRESSURE: 66 MMHG | TEMPERATURE: 98.2 F | OXYGEN SATURATION: 98 % | HEIGHT: 71 IN | BODY MASS INDEX: 28.84 KG/M2 | HEART RATE: 61 BPM | WEIGHT: 206 LBS | SYSTOLIC BLOOD PRESSURE: 139 MMHG

## 2018-02-01 VITALS
OXYGEN SATURATION: 98 % | DIASTOLIC BLOOD PRESSURE: 39 MMHG | SYSTOLIC BLOOD PRESSURE: 72 MMHG | TEMPERATURE: 98.6 F | RESPIRATION RATE: 14 BRPM

## 2018-02-01 PROCEDURE — 3600000002 HC SURGERY LEVEL 2 BASE: Performed by: SURGERY

## 2018-02-01 PROCEDURE — 6360000002 HC RX W HCPCS: Performed by: SURGERY

## 2018-02-01 PROCEDURE — 7100000010 HC PHASE II RECOVERY - FIRST 15 MIN: Performed by: SURGERY

## 2018-02-01 PROCEDURE — 2500000003 HC RX 250 WO HCPCS: Performed by: NURSE ANESTHETIST, CERTIFIED REGISTERED

## 2018-02-01 PROCEDURE — 3700000001 HC ADD 15 MINUTES (ANESTHESIA): Performed by: SURGERY

## 2018-02-01 PROCEDURE — 2580000003 HC RX 258: Performed by: SURGERY

## 2018-02-01 PROCEDURE — 3600000012 HC SURGERY LEVEL 2 ADDTL 15MIN: Performed by: SURGERY

## 2018-02-01 PROCEDURE — 6360000002 HC RX W HCPCS

## 2018-02-01 PROCEDURE — 15275 SKIN SUB GRAFT FACE/NK/HF/G: CPT | Performed by: SURGERY

## 2018-02-01 PROCEDURE — 3700000000 HC ANESTHESIA ATTENDED CARE: Performed by: SURGERY

## 2018-02-01 PROCEDURE — 97597 DBRDMT OPN WND 1ST 20 CM/<: CPT | Performed by: SURGERY

## 2018-02-01 PROCEDURE — 88304 TISSUE EXAM BY PATHOLOGIST: CPT

## 2018-02-01 PROCEDURE — 2720000010 HC SURG SUPPLY STERILE: Performed by: SURGERY

## 2018-02-01 PROCEDURE — 6360000002 HC RX W HCPCS: Performed by: NURSE ANESTHETIST, CERTIFIED REGISTERED

## 2018-02-01 PROCEDURE — 7100000011 HC PHASE II RECOVERY - ADDTL 15 MIN: Performed by: SURGERY

## 2018-02-01 PROCEDURE — 2500000003 HC RX 250 WO HCPCS: Performed by: SURGERY

## 2018-02-01 DEVICE — GRAFT HUM TISS NEO L7.5IN FORESKIN PROC APLIGRAF: Type: IMPLANTABLE DEVICE | Site: LEG | Status: FUNCTIONAL

## 2018-02-01 RX ORDER — SODIUM CHLORIDE, SODIUM LACTATE, POTASSIUM CHLORIDE, CALCIUM CHLORIDE 600; 310; 30; 20 MG/100ML; MG/100ML; MG/100ML; MG/100ML
INJECTION, SOLUTION INTRAVENOUS CONTINUOUS
Status: DISCONTINUED | OUTPATIENT
Start: 2018-02-01 | End: 2018-02-01 | Stop reason: HOSPADM

## 2018-02-01 RX ORDER — SODIUM CHLORIDE 0.9 % (FLUSH) 0.9 %
10 SYRINGE (ML) INJECTION EVERY 12 HOURS SCHEDULED
Status: DISCONTINUED | OUTPATIENT
Start: 2018-02-01 | End: 2018-02-01 | Stop reason: HOSPADM

## 2018-02-01 RX ORDER — LIDOCAINE HYDROCHLORIDE AND EPINEPHRINE BITARTRATE 20; .01 MG/ML; MG/ML
INJECTION, SOLUTION SUBCUTANEOUS PRN
Status: SHIPPED | OUTPATIENT
Start: 2018-02-01

## 2018-02-01 RX ORDER — SODIUM CHLORIDE 0.9 % (FLUSH) 0.9 %
10 SYRINGE (ML) INJECTION PRN
Status: DISCONTINUED | OUTPATIENT
Start: 2018-02-01 | End: 2018-02-01 | Stop reason: HOSPADM

## 2018-02-01 RX ORDER — MIDAZOLAM HYDROCHLORIDE 1 MG/ML
INJECTION INTRAMUSCULAR; INTRAVENOUS PRN
Status: DISCONTINUED | OUTPATIENT
Start: 2018-02-01 | End: 2018-02-01 | Stop reason: SDUPTHER

## 2018-02-01 RX ORDER — PROPOFOL 10 MG/ML
INJECTION, EMULSION INTRAVENOUS CONTINUOUS PRN
Status: DISCONTINUED | OUTPATIENT
Start: 2018-02-01 | End: 2018-02-01 | Stop reason: SDUPTHER

## 2018-02-01 RX ORDER — FENTANYL CITRATE 50 UG/ML
INJECTION, SOLUTION INTRAMUSCULAR; INTRAVENOUS PRN
Status: DISCONTINUED | OUTPATIENT
Start: 2018-02-01 | End: 2018-02-01 | Stop reason: SDUPTHER

## 2018-02-01 RX ORDER — MORPHINE SULFATE 4 MG/ML
1 INJECTION, SOLUTION INTRAMUSCULAR; INTRAVENOUS
Status: DISCONTINUED | OUTPATIENT
Start: 2018-02-01 | End: 2018-02-01 | Stop reason: HOSPADM

## 2018-02-01 RX ORDER — LIDOCAINE HYDROCHLORIDE 10 MG/ML
INJECTION, SOLUTION EPIDURAL; INFILTRATION; INTRACAUDAL; PERINEURAL PRN
Status: DISCONTINUED | OUTPATIENT
Start: 2018-02-01 | End: 2018-02-01 | Stop reason: SDUPTHER

## 2018-02-01 RX ORDER — ONDANSETRON 2 MG/ML
4 INJECTION INTRAMUSCULAR; INTRAVENOUS EVERY 6 HOURS PRN
Status: DISCONTINUED | OUTPATIENT
Start: 2018-02-01 | End: 2018-02-01 | Stop reason: HOSPADM

## 2018-02-01 RX ORDER — ACETAMINOPHEN 325 MG/1
650 TABLET ORAL EVERY 4 HOURS PRN
Status: DISCONTINUED | OUTPATIENT
Start: 2018-02-01 | End: 2018-02-01 | Stop reason: HOSPADM

## 2018-02-01 RX ADMIN — LIDOCAINE HYDROCHLORIDE 40 MG: 10 INJECTION, SOLUTION EPIDURAL; INFILTRATION; INTRACAUDAL; PERINEURAL at 08:42

## 2018-02-01 RX ADMIN — SODIUM CHLORIDE, POTASSIUM CHLORIDE, SODIUM LACTATE AND CALCIUM CHLORIDE: 600; 310; 30; 20 INJECTION, SOLUTION INTRAVENOUS at 07:37

## 2018-02-01 RX ADMIN — MIDAZOLAM HYDROCHLORIDE 1 MG: 2 INJECTION, SOLUTION INTRAMUSCULAR; INTRAVENOUS at 08:36

## 2018-02-01 RX ADMIN — FENTANYL CITRATE 50 MCG: 50 INJECTION, SOLUTION INTRAMUSCULAR; INTRAVENOUS at 08:36

## 2018-02-01 RX ADMIN — CEFAZOLIN SODIUM 2 G: 2 SOLUTION INTRAVENOUS at 08:36

## 2018-02-01 RX ADMIN — PROPOFOL 100 MCG/KG/MIN: 10 INJECTION, EMULSION INTRAVENOUS at 08:42

## 2018-02-01 ASSESSMENT — PAIN - FUNCTIONAL ASSESSMENT: PAIN_FUNCTIONAL_ASSESSMENT: 0-10

## 2018-02-01 ASSESSMENT — PAIN SCALES - GENERAL
PAINLEVEL_OUTOF10: 2
PAINLEVEL_OUTOF10: 4
PAINLEVEL_OUTOF10: 3

## 2018-02-01 ASSESSMENT — PAIN DESCRIPTION - ORIENTATION
ORIENTATION: LEFT
ORIENTATION: LEFT

## 2018-02-01 ASSESSMENT — PAIN DESCRIPTION - PAIN TYPE
TYPE: SURGICAL PAIN

## 2018-02-01 NOTE — ANESTHESIA PRE PROCEDURE
8/15/2003    Smokeless tobacco: Never Used    Alcohol use 0.6 oz/week     1 Cans of beer per week      Comment: occasional                                Counseling given: Not Answered      Vital Signs (Current):   Vitals:    02/01/18 0654   BP: (!) 167/74   Pulse: 73   Resp: 16   Temp: 36.8 °C (98.2 °F)   TempSrc: Temporal   SpO2: 97%   Weight: 206 lb (93.4 kg)   Height: 5' 11\" (1.803 m)                                              BP Readings from Last 3 Encounters:   02/01/18 (!) 167/74   01/24/18 128/68   01/24/18 (!) 162/68       NPO Status:  Since before midnight                        Time of last solid consumption: 2100                        Date of last liquid consumption: 01/31/18                        Date of last solid food consumption: 01/31/18    BMI:   Wt Readings from Last 3 Encounters:   02/01/18 206 lb (93.4 kg)   01/24/18 210 lb (95.3 kg)   12/19/17 214 lb (97.1 kg)     Body mass index is 28.73 kg/m². CBC:   Lab Results   Component Value Date    WBC 8.3 05/05/2017    RBC 4.84 05/05/2017    HGB 15.0 05/05/2017    HCT 45.7 05/05/2017    MCV 94.3 05/05/2017    RDW 12.5 05/05/2017     05/05/2017       CMP:   Lab Results   Component Value Date     05/05/2017    K 5.0 05/05/2017     05/05/2017    CO2 28 05/05/2017    BUN 19 05/05/2017    CREATININE 0.94 05/05/2017    GFRAA >60 05/05/2017    LABGLOM >60 05/05/2017    GLUCOSE 118 05/05/2017    PROT 7.7 05/05/2017    CALCIUM 9.7 05/05/2017    BILITOT 0.51 05/05/2017    ALKPHOS 74 05/05/2017    AST 20 05/05/2017    ALT 19 05/05/2017       POC Tests: No results for input(s): POCGLU, POCNA, POCK, POCCL, POCBUN, POCHEMO, POCHCT in the last 72 hours.     Coags: No results found for: PROTIME, INR, APTT    HCG (If Applicable): No results found for: PREGTESTUR, PREGSERUM, HCG, HCGQUANT     ABGs: No results found for: PHART, PO2ART, FIW7CJY, CKT9YAJ, BEART, B9FOLKVH     Type & Screen (If Applicable):  No results found for: LABABO,

## 2018-02-01 NOTE — H&P
HPI     Mr Maximiliano Kelley returns for application of new Apligraf after re excision L lower leg leiomyosarcoma biopsy site Nov 22, 2017, with Apligraf application 3:1 split thickness. Debridement with reapplication last week.  Doing well.  No fevers nor chills.  No pain.     Review of Systems   Constitutional: Negative for chills, diaphoresis and fever. HENT: Negative for nosebleeds and sore throat. Eyes: Negative for redness. Respiratory: Negative for cough. Cardiovascular: Negative for chest pain, palpitations and leg swelling. Gastrointestinal: Negative for abdominal pain, blood in stool and nausea. Genitourinary: Negative for dysuria, flank pain and hematuria. Musculoskeletal: Negative for back pain. Neurological: Negative for dizziness, focal weakness, seizures and weakness. Endo/Heme/Allergies: Does not bruise/bleed easily.         Past Medical History        Past Medical History:   Diagnosis Date    [facility] [address]       left lower leg    COPD (chronic obstructive pulmonary disease) (HCC)      Hyperlipidemia      Hypertension      Migraine              Past Surgical History         Past Surgical History:   Procedure Laterality Date    EXCISION / BIOPSY SKIN LESION OF LEG Left 11/13/2017     LEG LESION BIOPSY EXCISION-EXCISION LEFT LEG LESION performed by Nikki Santiago MD at [facility] 430 / BIOPSY SKIN LESION OF LEG   11/22/2017     LEG LESION BIOPSY EXCISION performed by Nikki [facility] MD at [facility]        SKIN GRAFT Left 11/22/2017     SKIN GRAFT SPLIT THICKNESS performed by Nikki Santiago MD at [place] [address]   History reviewed.  No pertinent family history.        Allergies:  See list     Current Facility-Administered Medications             Current Facility-Administered Medications   Medication Dose Route Frequency Provider Last Rate Last Dose    lactated ringers infusion   Intravenous Continuous Carol Herndon  mL/hr at 01/24/18 0834      sodium chloride flush 0.9 % injection 10 mL  10 mL Intravenous 2 times per day Carol Herndon MD        sodium chloride flush 0.9 % injection 10 mL  10 mL Intravenous PRN Carol Herndon MD        ceFAZolin (ANCEF) 2 g in dextrose 3 % 50 mL IVPB (duplex)  2 g Intravenous On Call to [address] Rocky Hazel MD                Social History   Social History            Social History    Marital status:        Spouse name: N/A    Number of children: N/A    Years of education: N/A             Social History Main Topics    Smoking status: Former Smoker       Quit date: 8/15/2003    Smokeless tobacco: Never Used    Alcohol use 0.6 oz/week       1 Cans of beer per week         Comment: occasional    Drug use: No    Sexual activity: Not Currently           Other Topics Concern    None          Social History Narrative    None            Physical Exam   Constitutional: He is oriented to person, place, and time. He appears well-developed and well-nourished. HENT:   Head: Normocephalic and atraumatic. Mouth/Throat: Oropharynx is clear and moist.   Eyes: Conjunctivae and EOM are normal. Pupils are equal, round, and reactive to light. No scleral icterus. Neck: Normal range of motion. Neck supple. No JVD present. No tracheal deviation present. Cardiovascular: Normal rate and regular rhythm. Pulmonary/Chest: Effort normal and breath sounds normal. No respiratory distress. He exhibits no tenderness. Abdominal: Soft. Bowel sounds are normal. He exhibits no distension and no mass. There is no tenderness. There is no rebound and no guarding. Musculoskeletal: Normal range of motion. He exhibits no edema. Lymphadenopathy:     He has no cervical adenopathy. Neurological: He is alert and oriented to person, place, and time. No cranial nerve deficit. Skin: Skin is warm and dry. No rash noted. No erythema. L lower leg wound.   Granulating

## 2018-02-01 NOTE — PROGRESS NOTES

## 2018-02-01 NOTE — ANESTHESIA POSTPROCEDURE EVALUATION
Department of Anesthesiology  Postprocedure Note    Patient: Anabel Henriquez  MRN: [id number]  YOB: 1940  Date of evaluation: 2/1/2018  Time:  9:15 AM     Procedure Summary     Date:  02/01/18 Room / Location:  [address] [place] [address]    Anesthesia Start:  0836 Anesthesia Stop:      Procedure:  LEG ULCER DEBRIDEMENT APPLICATION OF APLIGRAF (Left ) Diagnosis:  (LEFT LOWER LEG LEIOMYSARCOMA)    Surgeon:  Lisseth Garcia MD Responsible Provider:  Suzette Swift CRNA    Anesthesia Type:  MAC ASA Status:  2          Anesthesia Type: MAC    Mariposa Phase I: Mariposa Score: 10    Mariposa Phase II:      Last vitals: Reviewed and per EMR flowsheets.        Anesthesia Post Evaluation    Patient location during evaluation: bedside  Patient participation: complete - patient participated  Level of consciousness: awake and alert  Pain score: 0  Airway patency: patent  Nausea & Vomiting: no nausea and no vomiting  Complications: no  Cardiovascular status: hemodynamically stable  Respiratory status: acceptable and spontaneous ventilation  Hydration status: euvolemic

## 2018-02-02 LAB — SURGICAL PATHOLOGY REPORT: NORMAL

## 2018-02-04 NOTE — PROGRESS NOTES
Subjective:      Patient ID: Seleta Romberg is a 68 y.o. male. HPI     Mr Lashell Nick returns for follow up dressing change after re excision L lower leg leiomyosarcoma biopsy site Nov 22, 2017, with Apligraf application 3:1 split thickness.  Doing well.  No fevers nor chills.  No pain. Review of Systems     Past Medical History:   Diagnosis Date    Cancer [facility])     left lower leg    COPD (chronic obstructive pulmonary disease) ([address]     Hyperlipidemia     Hypertension     Migraine        Past Surgical History:   Procedure Laterality Date    EXCISION / BIOPSY SKIN LESION OF LEG Left 11/13/2017    LEG LESION BIOPSY EXCISION-EXCISION LEFT LEG LESION performed by Lorri Arora MD at [facility] 430 / BIOPSY SKIN LESION OF LEG  11/22/2017    LEG LESION BIOPSY EXCISION performed by Lorri Arora MD at [address]      [place] OFFICE/OUTPT VISIT,PROCEDURE ONLY Left 1/24/2018    LEG DEBRIDEMENT SKIN GRAFT--LEFT LEG DEBRIDEMENT WITH APLIGRAFT APPLICATION performed by Lorri Arora MD at [address] OFFICE/OUTPT [address] [facility] Left 2/1/2018    LEG ULCER DEBRIDEMENT APPLICATION OF APLIGRAF performed by Lorri Arora MD at [address] Left 11/22/2017    SKIN GRAFT SPLIT THICKNESS performed by Lorri Arora MD at [facility]         History reviewed. No pertinent family history.     Allergies:  See list    Current Outpatient Prescriptions   Medication Sig Dispense Refill    Naproxen Sodium (ALEVE) 220 MG CAPS Take by mouth      lisinopril (ZESTRIL) 30 MG tablet Take 1 tablet by mouth daily 90 tablet 3    lovastatin (MEVACOR) 40 MG tablet Take 1 tablet by mouth nightly 90 tablet 3    propranolol (INDERAL LA) 80 MG extended release capsule Take 1 capsule by mouth daily 90 capsule 3    sertraline (ZOLOFT) 50 MG tablet Take 1 tablet by mouth daily 90 tablet 3    amLODIPine (NORVASC) 5 MG tablet Take 1 tablet by mouth 2 times daily 180

## 2018-02-05 ENCOUNTER — OFFICE VISIT (OUTPATIENT)
Dept: SURGERY | Age: 78
End: 2018-02-05

## 2018-02-05 VITALS — RESPIRATION RATE: 18 BRPM | BODY MASS INDEX: 28.84 KG/M2 | WEIGHT: 206 LBS | HEIGHT: 71 IN

## 2018-02-05 DIAGNOSIS — Z51.89 VISIT FOR WOUND CHECK: Primary | ICD-10-CM

## 2018-02-05 PROCEDURE — 99024 POSTOP FOLLOW-UP VISIT: CPT | Performed by: SURGERY

## 2018-02-06 ENCOUNTER — ANESTHESIA EVENT (OUTPATIENT)
Dept: OPERATING ROOM | Age: 78
End: 2018-02-06
Payer: MEDICARE

## 2018-02-06 NOTE — PROGRESS NOTES
Subjective:      Patient ID: Bhavna Simms is a 68 y.o. male. HPI     Mr Mary Stevens returns for dressing change. He is s/p Apligraf application 4 days ago, 3rd graft. He is doing well. No pain. No fevers nor chills. Review of Systems     Past Medical History:   Diagnosis Date    Cancer [facility])     left lower leg    [facility] (chronic obstructive pulmonary disease) [facility]     Hyperlipidemia     Hypertension     Migraine        Past Surgical History:   Procedure Laterality Date    EXCISION / BIOPSY SKIN LESION OF LEG Left 11/13/2017    LEG LESION BIOPSY EXCISION-EXCISION LEFT LEG LESION performed by Brock Wells MD at [facility] 430 / BIOPSY SKIN LESION OF LEG  11/22/2017    LEG LESION BIOPSY EXCISION performed by Brock Wells MD at [address]      [place] OFFICE/OUTPT VISIT,PROCEDURE ONLY Left 1/24/2018    LEG DEBRIDEMENT SKIN GRAFT--LEFT LEG DEBRIDEMENT WITH APLIGRAFT APPLICATION performed by Brock Wells MD at [address] OFFICE/OUTPT [facility] Left 2/1/2018    LEG ULCER DEBRIDEMENT APPLICATION OF APLIGRAF performed by Brock Wells MD at [address] Left 11/22/2017    SKIN GRAFT SPLIT THICKNESS performed by Brock Wells MD at [address]         History reviewed. No pertinent family history.     Allergies:  See list    Current Outpatient Prescriptions   Medication Sig Dispense Refill    Naproxen Sodium (ALEVE) 220 MG CAPS Take by mouth      lisinopril (ZESTRIL) 30 MG tablet Take 1 tablet by mouth daily 90 tablet 3    lovastatin (MEVACOR) 40 MG tablet Take 1 tablet by mouth nightly 90 tablet 3    propranolol (INDERAL LA) 80 MG extended release capsule Take 1 capsule by mouth daily 90 capsule 3    sertraline (ZOLOFT) 50 MG tablet Take 1 tablet by mouth daily 90 tablet 3    amLODIPine (NORVASC) 5 MG tablet Take 1 tablet by mouth 2 times daily 180 tablet 3    tiotropium (SPIRIVA HANDIHALER) 18 MCG inhalation capsule Inhale 1 capsule into the lungs daily 90 capsule 3    Multiple Vitamin (MULTI VITAMIN DAILY PO) Take by mouth daily      albuterol (PROVENTIL) (2.5 MG/3ML) 0.083% nebulizer solution Take 3 mLs by nebulization every 6 hours as needed for Wheezing 120 each 11    albuterol sulfate HFA (PROAIR HFA) 108 (90 BASE) MCG/ACT inhaler Inhale 2 puffs into the lungs every 6 hours as needed for Wheezing 1 Inhaler 3    Psyllium (METAMUCIL FIBER SINGLES PO) Take  by mouth daily.  aspirin 81 MG EC tablet Take 81 mg by mouth daily.  Docusate Calcium (STOOL SOFTENER PO) Take  by mouth daily. No current facility-administered medications for this visit. Facility-Administered Medications Ordered in Other Visits   Medication Dose Route Frequency Provider Last Rate Last Dose    lidocaine-EPINEPHrine 2 percent-1:[id number] injection    PRN Mark Mas MD   10 mL at 02/01/18 1358       Social History     Social History    Marital status:      Spouse name: N/A    Number of children: N/A    Years of education: N/A     Social History Main Topics    Smoking status: Former Smoker     Quit date: 8/15/2003    Smokeless tobacco: Never Used    Alcohol use 0.6 oz/week     1 Cans of beer per week      Comment: occasional    Drug use: No    Sexual activity: Not Currently     Other Topics Concern    None     Social History Narrative    None       Objective:   Physical Exam     Wound is clean. Apligraf visible. Blue green drainage on Kerlix is minimal.    Dressing changed. Assessment:      1. Visit for wound check           Plan:      Doing very well. Dressing change Thursday. Next Apligraf next week.

## 2018-02-12 ENCOUNTER — ANESTHESIA (OUTPATIENT)
Dept: OPERATING ROOM | Age: 78
End: 2018-02-12
Payer: MEDICARE

## 2018-02-12 ENCOUNTER — HOSPITAL ENCOUNTER (OUTPATIENT)
Age: 78
Setting detail: OUTPATIENT SURGERY
Discharge: HOME | End: 2018-02-12
Attending: SURGERY | Admitting: SURGERY
Payer: MEDICARE

## 2018-02-12 VITALS
OXYGEN SATURATION: 100 % | TEMPERATURE: 97.7 F | RESPIRATION RATE: 21 BRPM | SYSTOLIC BLOOD PRESSURE: 117 MMHG | DIASTOLIC BLOOD PRESSURE: 59 MMHG

## 2018-02-12 VITALS
HEART RATE: 66 BPM | SYSTOLIC BLOOD PRESSURE: 142 MMHG | RESPIRATION RATE: 16 BRPM | TEMPERATURE: 97.9 F | WEIGHT: 210 LBS | DIASTOLIC BLOOD PRESSURE: 78 MMHG | BODY MASS INDEX: 30.06 KG/M2 | HEIGHT: 70 IN | OXYGEN SATURATION: 99 %

## 2018-02-12 PROCEDURE — 88304 TISSUE EXAM BY PATHOLOGIST: CPT

## 2018-02-12 PROCEDURE — 2500000003 HC RX 250 WO HCPCS: Performed by: NURSE ANESTHETIST, CERTIFIED REGISTERED

## 2018-02-12 PROCEDURE — 2720000010 HC SURG SUPPLY STERILE: Performed by: SURGERY

## 2018-02-12 PROCEDURE — 7100000011 HC PHASE II RECOVERY - ADDTL 15 MIN: Performed by: SURGERY

## 2018-02-12 PROCEDURE — 3600000002 HC SURGERY LEVEL 2 BASE: Performed by: SURGERY

## 2018-02-12 PROCEDURE — 3700000001 HC ADD 15 MINUTES (ANESTHESIA): Performed by: SURGERY

## 2018-02-12 PROCEDURE — 6360000002 HC RX W HCPCS: Performed by: SURGERY

## 2018-02-12 PROCEDURE — 7100000010 HC PHASE II RECOVERY - FIRST 15 MIN: Performed by: SURGERY

## 2018-02-12 PROCEDURE — 3700000000 HC ANESTHESIA ATTENDED CARE: Performed by: SURGERY

## 2018-02-12 PROCEDURE — 6360000002 HC RX W HCPCS: Performed by: NURSE ANESTHETIST, CERTIFIED REGISTERED

## 2018-02-12 PROCEDURE — 2580000003 HC RX 258: Performed by: SURGERY

## 2018-02-12 PROCEDURE — 3600000012 HC SURGERY LEVEL 2 ADDTL 15MIN: Performed by: SURGERY

## 2018-02-12 PROCEDURE — 2500000003 HC RX 250 WO HCPCS: Performed by: SURGERY

## 2018-02-12 DEVICE — GRAFT HUM TISS NEO L7.5IN FORESKIN PROC APLIGRAF: Type: IMPLANTABLE DEVICE | Site: LEG | Status: FUNCTIONAL

## 2018-02-12 RX ORDER — ACETAMINOPHEN 325 MG/1
650 TABLET ORAL EVERY 4 HOURS PRN
Status: CANCELLED | OUTPATIENT
Start: 2018-02-12

## 2018-02-12 RX ORDER — SODIUM CHLORIDE 0.9 % (FLUSH) 0.9 %
10 SYRINGE (ML) INJECTION PRN
Status: CANCELLED | OUTPATIENT
Start: 2018-02-12

## 2018-02-12 RX ORDER — SODIUM CHLORIDE, SODIUM LACTATE, POTASSIUM CHLORIDE, CALCIUM CHLORIDE 600; 310; 30; 20 MG/100ML; MG/100ML; MG/100ML; MG/100ML
INJECTION, SOLUTION INTRAVENOUS CONTINUOUS
Status: CANCELLED | OUTPATIENT
Start: 2018-02-12

## 2018-02-12 RX ORDER — SODIUM CHLORIDE 0.9 % (FLUSH) 0.9 %
10 SYRINGE (ML) INJECTION PRN
Status: DISCONTINUED | OUTPATIENT
Start: 2018-02-12 | End: 2018-02-12 | Stop reason: HOSPADM

## 2018-02-12 RX ORDER — SODIUM CHLORIDE, SODIUM LACTATE, POTASSIUM CHLORIDE, CALCIUM CHLORIDE 600; 310; 30; 20 MG/100ML; MG/100ML; MG/100ML; MG/100ML
INJECTION, SOLUTION INTRAVENOUS CONTINUOUS
Status: DISCONTINUED | OUTPATIENT
Start: 2018-02-12 | End: 2018-02-12 | Stop reason: HOSPADM

## 2018-02-12 RX ORDER — MORPHINE SULFATE 1 MG/ML
1 INJECTION, SOLUTION EPIDURAL; INTRATHECAL; INTRAVENOUS
Status: CANCELLED | OUTPATIENT
Start: 2018-02-12

## 2018-02-12 RX ORDER — SODIUM CHLORIDE 0.9 % (FLUSH) 0.9 %
10 SYRINGE (ML) INJECTION EVERY 12 HOURS SCHEDULED
Status: DISCONTINUED | OUTPATIENT
Start: 2018-02-12 | End: 2018-02-12 | Stop reason: HOSPADM

## 2018-02-12 RX ORDER — FENTANYL CITRATE 50 UG/ML
INJECTION, SOLUTION INTRAMUSCULAR; INTRAVENOUS PRN
Status: DISCONTINUED | OUTPATIENT
Start: 2018-02-12 | End: 2018-02-12 | Stop reason: SDUPTHER

## 2018-02-12 RX ORDER — PROPOFOL 10 MG/ML
INJECTION, EMULSION INTRAVENOUS CONTINUOUS PRN
Status: DISCONTINUED | OUTPATIENT
Start: 2018-02-12 | End: 2018-02-12 | Stop reason: SDUPTHER

## 2018-02-12 RX ORDER — SODIUM CHLORIDE 0.9 % (FLUSH) 0.9 %
10 SYRINGE (ML) INJECTION EVERY 12 HOURS SCHEDULED
Status: CANCELLED | OUTPATIENT
Start: 2018-02-12

## 2018-02-12 RX ORDER — MIDAZOLAM HYDROCHLORIDE 1 MG/ML
INJECTION INTRAMUSCULAR; INTRAVENOUS PRN
Status: DISCONTINUED | OUTPATIENT
Start: 2018-02-12 | End: 2018-02-12 | Stop reason: SDUPTHER

## 2018-02-12 RX ORDER — LIDOCAINE HYDROCHLORIDE 10 MG/ML
INJECTION, SOLUTION EPIDURAL; INFILTRATION; INTRACAUDAL; PERINEURAL PRN
Status: DISCONTINUED | OUTPATIENT
Start: 2018-02-12 | End: 2018-02-12 | Stop reason: SDUPTHER

## 2018-02-12 RX ORDER — LIDOCAINE HYDROCHLORIDE AND EPINEPHRINE 10; 10 MG/ML; UG/ML
INJECTION, SOLUTION INFILTRATION; PERINEURAL PRN
Status: DISCONTINUED | OUTPATIENT
Start: 2018-02-12 | End: 2018-02-12 | Stop reason: HOSPADM

## 2018-02-12 RX ORDER — ONDANSETRON 2 MG/ML
4 INJECTION INTRAMUSCULAR; INTRAVENOUS EVERY 6 HOURS PRN
Status: CANCELLED | OUTPATIENT
Start: 2018-02-12

## 2018-02-12 RX ADMIN — MIDAZOLAM 2 MG: 1 INJECTION INTRAMUSCULAR; INTRAVENOUS at 10:45

## 2018-02-12 RX ADMIN — PROPOFOL 75 MCG/KG/MIN: 10 INJECTION, EMULSION INTRAVENOUS at 10:55

## 2018-02-12 RX ADMIN — SODIUM CHLORIDE, POTASSIUM CHLORIDE, SODIUM LACTATE AND CALCIUM CHLORIDE: 600; 310; 30; 20 INJECTION, SOLUTION INTRAVENOUS at 09:07

## 2018-02-12 RX ADMIN — LIDOCAINE HYDROCHLORIDE 4 ML: 10 INJECTION, SOLUTION EPIDURAL; INFILTRATION; INTRACAUDAL; PERINEURAL at 10:55

## 2018-02-12 RX ADMIN — FENTANYL CITRATE 50 MCG: 50 INJECTION, SOLUTION INTRAMUSCULAR; INTRAVENOUS at 10:55

## 2018-02-12 ASSESSMENT — PAIN - FUNCTIONAL ASSESSMENT: PAIN_FUNCTIONAL_ASSESSMENT: 0-10

## 2018-02-12 ASSESSMENT — PAIN SCALES - GENERAL: PAINLEVEL_OUTOF10: 2

## 2018-02-12 NOTE — ANESTHESIA POSTPROCEDURE EVALUATION
Department of Anesthesiology  Postprocedure Note    Patient: Taft Councilman  MRN: [id number]  YOB: 1940  Date of evaluation: 2/12/2018  Time:  11:39 AM     Procedure Summary     Date:  02/12/18 Room / Location:  [address] [place] [address]    Anesthesia Start:  0698 Anesthesia Stop:  0051    Procedure:  LEG ULCER DEBRIDEMENT APPLICATION OF APLIGRAF----Application Apligraf ,Debridement Left Lower Leg Wound (Left ) Diagnosis:  (Left lower leg wound)    Surgeon:  Bob Mello MD Responsible Provider:  Brett Barraza CRNA    Anesthesia Type:  MAC ASA Status:  2          Anesthesia Type: MAC    Mariposa Phase I: Mariposa Score: 10    Mariposa Phase II: Mariposa Score: 10    Last vitals: Reviewed and per EMR flowsheets.        Anesthesia Post Evaluation    Patient location during evaluation: bedside  Patient participation: complete - patient participated  Level of consciousness: awake and alert  Airway patency: patent  Nausea & Vomiting: no nausea and no vomiting  Complications: no  Cardiovascular status: hemodynamically stable  Respiratory status: acceptable and spontaneous ventilation

## 2018-02-12 NOTE — ANESTHESIA PRE PROCEDURE
Department of Anesthesiology  Preprocedure Note       Name:  Saida Madison   Age:  68 y.o.  :  1940                                          MRN:  [id number]         Date:  2018      Surgeon: Oren Pérez):  El Ruggiero MD    Procedure: Procedure(s):  LEG ULCER DEBRIDEMENT APPLICATION OF APLIGRAF----Application Apligraf Possible Debridement Left Lower Leg Wound    Medications prior to admission:   Prior to Admission medications    Medication Sig Start Date End Date Taking? Authorizing Provider   Naproxen Sodium (ALEVE) 220 MG CAPS Take by mouth every 6 hours as needed    Yes Historical Provider, MD   lisinopril (ZESTRIL) 30 MG tablet Take 1 tablet by mouth daily 10/9/17  Yes Miles Jaquez, DO   lovastatin (MEVACOR) 40 MG tablet Take 1 tablet by mouth nightly 10/9/17  Yes Miles Jaquez, DO   propranolol (INDERAL LA) 80 MG extended release capsule Take 1 capsule by mouth daily 10/9/17  Yes Miles Jaquez, DO   sertraline (ZOLOFT) 50 MG tablet Take 1 tablet by mouth daily 10/9/17  Yes Miles Jaquez, DO   amLODIPine (NORVASC) 5 MG tablet Take 1 tablet by mouth 2 times daily 10/9/17  Yes Miles Jaquez, DO   tiotropium (SPIRIVA HANDIHALER) 18 MCG inhalation capsule Inhale 1 capsule into the lungs daily 10/9/17  Yes Miles Jaquez, DO   Multiple Vitamin (MULTI VITAMIN DAILY PO) Take by mouth daily   Yes Historical Provider, MD   Psyllium (METAMUCIL FIBER SINGLES PO) Take  by mouth daily. Yes Historical Provider, MD   Docusate Calcium (STOOL SOFTENER PO) Take  by mouth daily. Yes Historical Provider, MD   albuterol (PROVENTIL) (2.5 MG/3ML) 0.083% nebulizer solution Take 3 mLs by nebulization every 6 hours as needed for Wheezing 17   Yeni Acosta MD   albuterol sulfate HFA (PROAIR HFA) 108 (90 BASE) MCG/ACT inhaler Inhale 2 puffs into the lungs every 6 hours as needed for Wheezing 3/30/16   Miles Jaquez DO   aspirin 81 MG EC tablet Take 81 mg by mouth daily.       Historical Provider, MD DEBRIDEMENT SKIN GRAFT--LEFT LEG DEBRIDEMENT WITH APLIGRAFT APPLICATION performed by Kai Delaney MD at [address] [facility] OFFICE/OUTPT [address] [facility] [address] Left 2/1/2018    LEG ULCER DEBRIDEMENT APPLICATION OF APLIGRAF performed by Kai Delaney MD at [address] Left 11/22/2017    SKIN GRAFT SPLIT THICKNESS performed by Kai Delaney MD at [facility]         Social History:    Social History   Substance Use Topics    Smoking status: Former Smoker     Quit date: 8/15/2003    Smokeless tobacco: Never Used    Alcohol use 0.6 oz/week     1 Cans of beer per week      Comment: occasional                                Counseling given: Not Answered      Vital Signs (Current):   Vitals:    02/12/18 0851   BP: (!) 192/83   Pulse: 71   Resp: 16   Temp: 36.6 °C (97.9 °F)   TempSrc: Temporal   SpO2: 98%   Weight: 210 lb (95.3 kg)   Height: 5' 10\" (1.778 m)                                              BP Readings from Last 3 Encounters:   02/12/18 (!) 192/83   02/01/18 (!) 72/39   02/01/18 139/66       NPO Status: Time of last liquid consumption: 2200                        Time of last solid consumption: 2000                        Date of last liquid consumption: 02/11/18                        Date of last solid food consumption: 02/11/18    BMI:   Wt Readings from Last 3 Encounters:   02/12/18 210 lb (95.3 kg)   02/05/18 206 lb (93.4 kg)   02/01/18 206 lb (93.4 kg)     Body mass index is 30.13 kg/m².     CBC:   Lab Results   Component Value Date    WBC 8.3 05/05/2017    RBC 4.84 05/05/2017    HGB 15.0 05/05/2017    HCT 45.7 05/05/2017    MCV 94.3 05/05/2017    RDW 12.5 05/05/2017     05/05/2017       CMP:   Lab Results   Component Value Date     05/05/2017    K 5.0 05/05/2017     05/05/2017    CO2 28 05/05/2017    BUN 19 05/05/2017    CREATININE 0.94 05/05/2017    GFRAA >60 05/05/2017    LABGLOM >60 05/05/2017    GLUCOSE 118 05/05/2017    PROT 7.7 05/05/2017    CALCIUM 9.7 05/05/2017    BILITOT 0.51 05/05/2017    ALKPHOS 74 05/05/2017    AST 20 05/05/2017    ALT 19 05/05/2017       POC Tests: No results for input(s): POCGLU, POCNA, POCK, POCCL, POCBUN, POCHEMO, POCHCT in the last 72 hours. Coags: No results found for: PROTIME, INR, APTT    HCG (If Applicable): No results found for: PREGTESTUR, PREGSERUM, HCG, HCGQUANT     ABGs: No results found for: PHART, PO2ART, ZWE2PPN, DPF1MPH, BEART, M0GRJYSP     Type & Screen (If Applicable):  No results found for: LABABO, [address]    Anesthesia Evaluation  Patient summary reviewed and Nursing notes reviewed no history of anesthetic complications:   Airway: Mallampati: II  TM distance: >3 FB   Neck ROM: full  Mouth opening: > = 3 FB Dental:    (+) edentulous      Pulmonary:normal exam    (+) COPD:                             Cardiovascular:    (+) hypertension:,       ECG reviewed               Beta Blocker:  Dose within 24 Hrs         Neuro/Psych:   (+) headaches: migraine headaches, psychiatric history:            GI/Hepatic/Renal: Neg GI/Hepatic/Renal ROS            Endo/Other: Negative Endo/Other ROS             Pt had PAT visit. Abdominal:           Vascular:                                    Anesthesia Plan      MAC     ASA 2             Anesthetic plan and risks discussed with patient. Plan discussed with attending.                   Bertt Barraza CRNA   2/12/2018

## 2018-02-12 NOTE — BRIEF OP NOTE
Brief Postoperative Note  ______________________________________________________________    Patient: Tiana Cleveland  YOB: 1940  MRN: [id number]  Date of Procedure: 2/12/2018    Pre-Op Diagnosis:      1.  L lower leg open wound     2. S/P excision leiomyosarcoma    Post-Op Diagnosis:      1.  L lower leg open wound     2. S/P excision leomyosarcoma       Procedure(s):      1. Debridement L lower leg wound  (exc julieta ~10cm x 5cm)     2. Application of Apligraf    Anesthesia: Monitor Anesthesia Care    Surgeon(s):  Sonam Terry MD    Staff:  Scrub Person First: Rayna Swain     Estimated Blood Loss:  Less than 43DI    Complications: None    Specimens:   ID Type Source Tests Collected by Time Destination   A :  Tissue Leg SURGICAL PATHOLOGY Sonam Terry MD 2/12/2018 1112        Implants:    Implant Name Type Inv. Item Serial No.  Lot No. LRB No. Used   TITA-TISS SKIN SUBST APLIGRAF PER STND 44 SQ CM UNIT Bone/Graft/Tissue TITA-TISS SKIN SUBST APLIGRAF PER STND 44 SQ CM UNIT   [employer] [id number] Left 1         Drains:  None    Findings:  As above.     Dictated # [id number]    Sonam Terry MD  Date: 2/12/2018  Time: 12:36 PM

## 2018-02-13 NOTE — OP NOTE
graft was gently stretched to cover the  wound. It was secured in place with Steri-Strips, then covered with a  fluffed Kerlix dressing, followed by ABD, Kerlix wrap, and a compression  stocking. All sponge, needle, and instrument counts were correct at the  end of the case. The patient tolerated the procedure well and was  transferred to PACU in stable condition. SPECIMENS:  Granulation tissue, left lower leg wound. DRAINS:  None. COMPLICATIONS:  None. DISPOSITION:  To PACU awake, alert, and stable. Following recovery, we  will discharge the patient home with wound care instructions with gradual  advancement of diet and activity as tolerated. Follow up will be later  this week for a dressing change and reapplication of Apligraf most likely  in two weeks. My thanks to Dr. Phoenix for the consultation. ELADIO Talbert    D: 02/12/2018 12:43:29       T: 02/12/2018 21:49:29     [email or web address]  Job#: [id number]     Doc#: [id number]    CC:  Hector Roberts

## 2018-02-14 LAB — SURGICAL PATHOLOGY REPORT: NORMAL

## 2018-02-21 DIAGNOSIS — C49.22 LEIOMYOSARCOMA OF LEG, LEFT (HCC): Primary | ICD-10-CM

## 2018-02-26 ENCOUNTER — ANESTHESIA EVENT (OUTPATIENT)
Dept: OPERATING ROOM | Age: 78
End: 2018-02-26
Payer: MEDICARE

## 2018-02-26 ENCOUNTER — HOSPITAL ENCOUNTER (OUTPATIENT)
Age: 78
Setting detail: OUTPATIENT SURGERY
Discharge: HOME | End: 2018-02-26
Attending: SURGERY | Admitting: SURGERY
Payer: MEDICARE

## 2018-02-26 ENCOUNTER — ANESTHESIA (OUTPATIENT)
Dept: OPERATING ROOM | Age: 78
End: 2018-02-26
Payer: MEDICARE

## 2018-02-26 VITALS
RESPIRATION RATE: 15 BRPM | TEMPERATURE: 97.2 F | OXYGEN SATURATION: 100 % | DIASTOLIC BLOOD PRESSURE: 54 MMHG | SYSTOLIC BLOOD PRESSURE: 110 MMHG

## 2018-02-26 VITALS
BODY MASS INDEX: 28.56 KG/M2 | OXYGEN SATURATION: 97 % | TEMPERATURE: 97.7 F | SYSTOLIC BLOOD PRESSURE: 155 MMHG | WEIGHT: 204 LBS | RESPIRATION RATE: 18 BRPM | HEIGHT: 71 IN | HEART RATE: 60 BPM | DIASTOLIC BLOOD PRESSURE: 72 MMHG

## 2018-02-26 PROCEDURE — 2720000010 HC SURG SUPPLY STERILE: Performed by: SURGERY

## 2018-02-26 PROCEDURE — 2500000003 HC RX 250 WO HCPCS: Performed by: SURGERY

## 2018-02-26 PROCEDURE — 2500000003 HC RX 250 WO HCPCS: Performed by: NURSE ANESTHETIST, CERTIFIED REGISTERED

## 2018-02-26 PROCEDURE — 6360000002 HC RX W HCPCS: Performed by: NURSE ANESTHETIST, CERTIFIED REGISTERED

## 2018-02-26 PROCEDURE — 3600000013 HC SURGERY LEVEL 3 ADDTL 15MIN: Performed by: SURGERY

## 2018-02-26 PROCEDURE — 3700000001 HC ADD 15 MINUTES (ANESTHESIA): Performed by: SURGERY

## 2018-02-26 PROCEDURE — 3700000000 HC ANESTHESIA ATTENDED CARE: Performed by: SURGERY

## 2018-02-26 PROCEDURE — 88304 TISSUE EXAM BY PATHOLOGIST: CPT

## 2018-02-26 PROCEDURE — 6360000002 HC RX W HCPCS: Performed by: SURGERY

## 2018-02-26 PROCEDURE — 2580000003 HC RX 258: Performed by: SURGERY

## 2018-02-26 PROCEDURE — 7100000011 HC PHASE II RECOVERY - ADDTL 15 MIN: Performed by: SURGERY

## 2018-02-26 PROCEDURE — 3600000003 HC SURGERY LEVEL 3 BASE: Performed by: SURGERY

## 2018-02-26 PROCEDURE — 7100000010 HC PHASE II RECOVERY - FIRST 15 MIN: Performed by: SURGERY

## 2018-02-26 DEVICE — GRAFT HUM TISS NEO L7.5IN FORESKIN PROC APLIGRAF: Type: IMPLANTABLE DEVICE | Site: LEG | Status: FUNCTIONAL

## 2018-02-26 RX ORDER — SODIUM CHLORIDE 0.9 % (FLUSH) 0.9 %
10 SYRINGE (ML) INJECTION EVERY 12 HOURS SCHEDULED
Status: DISCONTINUED | OUTPATIENT
Start: 2018-02-26 | End: 2018-02-26 | Stop reason: HOSPADM

## 2018-02-26 RX ORDER — ONDANSETRON 2 MG/ML
4 INJECTION INTRAMUSCULAR; INTRAVENOUS EVERY 6 HOURS PRN
Status: DISCONTINUED | OUTPATIENT
Start: 2018-02-26 | End: 2018-02-26 | Stop reason: HOSPADM

## 2018-02-26 RX ORDER — ACETAMINOPHEN 325 MG/1
650 TABLET ORAL EVERY 4 HOURS PRN
Status: DISCONTINUED | OUTPATIENT
Start: 2018-02-26 | End: 2018-02-26 | Stop reason: HOSPADM

## 2018-02-26 RX ORDER — LIDOCAINE HYDROCHLORIDE AND EPINEPHRINE 10; 10 MG/ML; UG/ML
INJECTION, SOLUTION INFILTRATION; PERINEURAL PRN
Status: DISCONTINUED | OUTPATIENT
Start: 2018-02-26 | End: 2018-02-26 | Stop reason: HOSPADM

## 2018-02-26 RX ORDER — MORPHINE SULFATE 1 MG/ML
1 INJECTION, SOLUTION EPIDURAL; INTRATHECAL; INTRAVENOUS
Status: DISCONTINUED | OUTPATIENT
Start: 2018-02-26 | End: 2018-02-26 | Stop reason: HOSPADM

## 2018-02-26 RX ORDER — SODIUM CHLORIDE, SODIUM LACTATE, POTASSIUM CHLORIDE, CALCIUM CHLORIDE 600; 310; 30; 20 MG/100ML; MG/100ML; MG/100ML; MG/100ML
INJECTION, SOLUTION INTRAVENOUS CONTINUOUS
Status: DISCONTINUED | OUTPATIENT
Start: 2018-02-26 | End: 2018-02-26 | Stop reason: HOSPADM

## 2018-02-26 RX ORDER — SODIUM CHLORIDE 0.9 % (FLUSH) 0.9 %
10 SYRINGE (ML) INJECTION PRN
Status: DISCONTINUED | OUTPATIENT
Start: 2018-02-26 | End: 2018-02-26 | Stop reason: HOSPADM

## 2018-02-26 RX ORDER — PROPOFOL 10 MG/ML
INJECTION, EMULSION INTRAVENOUS CONTINUOUS PRN
Status: DISCONTINUED | OUTPATIENT
Start: 2018-02-26 | End: 2018-02-26 | Stop reason: SDUPTHER

## 2018-02-26 RX ORDER — MIDAZOLAM HYDROCHLORIDE 1 MG/ML
INJECTION INTRAMUSCULAR; INTRAVENOUS PRN
Status: DISCONTINUED | OUTPATIENT
Start: 2018-02-26 | End: 2018-02-26 | Stop reason: SDUPTHER

## 2018-02-26 RX ORDER — FENTANYL CITRATE 50 UG/ML
INJECTION, SOLUTION INTRAMUSCULAR; INTRAVENOUS PRN
Status: DISCONTINUED | OUTPATIENT
Start: 2018-02-26 | End: 2018-02-26 | Stop reason: SDUPTHER

## 2018-02-26 RX ORDER — LIDOCAINE HYDROCHLORIDE 10 MG/ML
INJECTION, SOLUTION EPIDURAL; INFILTRATION; INTRACAUDAL; PERINEURAL PRN
Status: DISCONTINUED | OUTPATIENT
Start: 2018-02-26 | End: 2018-02-26 | Stop reason: SDUPTHER

## 2018-02-26 RX ADMIN — LIDOCAINE HYDROCHLORIDE 4 ML: 10 INJECTION, SOLUTION EPIDURAL; INFILTRATION; INTRACAUDAL; PERINEURAL at 14:12

## 2018-02-26 RX ADMIN — SODIUM CHLORIDE, POTASSIUM CHLORIDE, SODIUM LACTATE AND CALCIUM CHLORIDE: 600; 310; 30; 20 INJECTION, SOLUTION INTRAVENOUS at 11:03

## 2018-02-26 RX ADMIN — PROPOFOL 75 MCG/KG/MIN: 10 INJECTION, EMULSION INTRAVENOUS at 14:13

## 2018-02-26 RX ADMIN — MIDAZOLAM HYDROCHLORIDE 2 MG: 2 INJECTION, SOLUTION INTRAMUSCULAR; INTRAVENOUS at 14:06

## 2018-02-26 RX ADMIN — FENTANYL CITRATE 50 MCG: 50 INJECTION, SOLUTION INTRAMUSCULAR; INTRAVENOUS at 14:09

## 2018-02-26 ASSESSMENT — PAIN SCALES - GENERAL: PAINLEVEL_OUTOF10: 0

## 2018-02-26 ASSESSMENT — PAIN - FUNCTIONAL ASSESSMENT: PAIN_FUNCTIONAL_ASSESSMENT: 0-10

## 2018-02-26 ASSESSMENT — COPD QUESTIONNAIRES: CAT_SEVERITY: MILD

## 2018-02-26 NOTE — BRIEF OP NOTE
Brief Postoperative Note  ______________________________________________________________    Patient: Olinda Cleveland  YOB: 1940  MRN: [id number]  Date of Procedure: 2/26/2018    Pre-Op Diagnosis:      1.  L lower leg open wound     2. S/P excision leiomyosarcoma    Post-Op Diagnosis:      1.  L lower leg open wound     2. S/P excision leiomyosarcoma       Procedure(s):      1. Debridement L lower leg wound (exc julieta ~ 10cm x 4cm)     2. Application of Apligraf    Anesthesia: Monitor Anesthesia Care    Surgeon(s):  Burgess Tito MD    Staff:  Surgical Assistant: Darlyn Mcbride  Scrub Person First: Connor Costa     Estimated Blood Loss:  Less than 07ZW    Complications: None    Specimens:   ID Type Source Tests Collected by Time Destination   A :  Tissue Leg SURGICAL PATHOLOGY Burgess Tito MD 2/26/2018 1443        Implants:    Implant Name Type Inv. Item Serial No.  Lot No. LRB No. Used   TITA-TISS SKIN SUBST APLIGRAF PER STND 44 SQ CM UNIT Bone/Graft/Tissue TITA-TISS SKIN SUBST APLIGRAF PER STND 44 SQ CM UNIT   [employer] [id number] 14 Left 1     Findings:   As above.     Dictated # [id number]    Burgess Tito MD  Date: 2/26/2018  Time: 2:54 PM

## 2018-02-26 NOTE — PROGRESS NOTES
Pt up to bathroom with steady gait. IV removed and pt dresses. Drinking water and eating crackers. Discharge Criteria  Outpatients must meet criteria 1 through 7. Up to restroom, void sufficient amount. Yes    1. Minimum 30 minutes after last dose of sedative medication, minimum 120 minutes after last dose of reversal agent. Yes    2. Systolic BP stable within 20 mmHg for 30 minutes & systolic BP between 90 & 900 or within 10 mmHg of baseline. Yes    3. Pulse between 60 and 100 or within 10 bpm of baseline. Yes    4. Spontaneous respiratory rate >/= 10 per minute. Yes    5. SaO2 >/= 95 or  >/= baseline. Yes    6. Able to cough and swallow or return to baseline function. Yes    7. Alert and oriented or return to baseline mental status. Yes    8. Demonstrates controlled, coordinated movements, ambulates with steady gait, or return to baseline activity function. Yes    9. Minimal or no pain or nausea, or at a level tolerable and acceptable to patient. Yes    10. Takes and retains oral fluids as allowed. Yes    11. Procedural / perioperative site stable. Minimal or no bleeding. Yes        12. If GI endoscopy procedure, minimal or no abdominal distention or passing flatus. Yes    13. Written discharge instructions and emergency telephone number provided. Yes    14. Accompanied by a responsible adult. Yes    Adult patient discharged from facility without responsible person meets above criteria plus the following:   a) remains awake without stimulus for 30 minutes     b) oriented appropriate for age      c) all vital signs stable   d) no significant risk of losing protective reflexes      e) able to maintain pre-procedure mobility without assistance   f) no nausea or dizziness      g) transportation arrangements that do not require patient to operate motor   Vehicle.   Yes

## 2018-02-26 NOTE — ANESTHESIA PRE PROCEDURE
Tam Rolon MD at [address] [facility] OFFICE/OUTPT VISIT,PROCEDURE ONLY Left 2/1/2018    LEG ULCER DEBRIDEMENT APPLICATION OF APLIGRAF performed by Riley Thompson MD at [address] [facility] OFFICE/OUTPT [address] [facility] Left 2/12/2018    LEG ULCER DEBRIDEMENT APPLICATION OF APLIGRAF----Application Apligraf ,Debridement Left Lower Leg Wound performed by Riley Thompson MD at [address] Left 11/22/2017    SKIN GRAFT SPLIT THICKNESS performed by Riley Thompson MD at [address] [facility]         Social History:    Social History   Substance Use Topics    Smoking status: Former Smoker     Quit date: 8/15/2003    Smokeless tobacco: Never Used    Alcohol use 0.6 oz/week     1 Cans of beer per week      Comment: occasional                                Counseling given: Not Answered      Vital Signs (Current):   Vitals:    02/26/18 1043   BP: (!) 176/71   Pulse: 69   Resp: 18   Temp: 36.2 °C (97.2 °F)   TempSrc: Temporal   SpO2: 100%   Weight: 204 lb (92.5 kg)   Height: 5' 11\" (1.803 m)                                              BP Readings from Last 3 Encounters:   02/26/18 (!) 176/71   02/12/18 (!) 117/59   02/12/18 (!) 142/78       NPO Status: Time of last liquid consumption: 2200                        Time of last solid consumption: 1800                        Date of last liquid consumption: 02/25/18                        Date of last solid food consumption: 02/25/18    BMI:   Wt Readings from Last 3 Encounters:   02/26/18 204 lb (92.5 kg)   02/12/18 210 lb (95.3 kg)   02/05/18 206 lb (93.4 kg)     Body mass index is 28.45 kg/m².     CBC:   Lab Results   Component Value Date    WBC 8.3 05/05/2017    RBC 4.84 05/05/2017    HGB 15.0 05/05/2017    HCT 45.7 05/05/2017    MCV 94.3 05/05/2017    RDW 12.5 05/05/2017     05/05/2017       CMP:   Lab Results   Component Value Date     05/05/2017    K 5.0 05/05/2017     05/05/2017    CO2 28 05/05/2017    BUN 19 05/05/2017    CREATININE 0.94

## 2018-02-26 NOTE — ANESTHESIA POSTPROCEDURE EVALUATION
Department of Anesthesiology  Postprocedure Note    Patient: Geo Lopez  MRN: [id number]  YOB: 1940  Date of evaluation: 2/26/2018  Time:  3:00 PM     Procedure Summary     Date:  02/26/18 Room / Location:  [address] [place] [address] 01 / Lynae Mcardle OR    Anesthesia Start:  1409 Anesthesia Stop:  7218    Procedure:  LEG ULCER DEBRIDEMENT APPLICATION OF APLIGRAF (Left ) Diagnosis:  (LEFT LOWER LEG OPEN WOUND, EXCISION LEOMYOSARCOMA)    Surgeon:  Sonam Terry MD Responsible Provider:  Baltazar Alvarez CRNA    Anesthesia Type:  MAC ASA Status:  2          Anesthesia Type: MAC    Mariposa Phase I: Mariposa Score: 10    Mariposa Phase II: Mariposa Score: 10    Last vitals: Reviewed and per EMR flowsheets.        Anesthesia Post Evaluation    Patient location during evaluation: bedside  Patient participation: complete - patient participated  Level of consciousness: awake and alert  Pain score: 0  Airway patency: patent  Nausea & Vomiting: no nausea and no vomiting  Complications: no  Cardiovascular status: hemodynamically stable  Respiratory status: acceptable and spontaneous ventilation  Hydration status: euvolemic

## 2018-02-27 NOTE — OP NOTE
[facility]                              [address] [address]                                 OPERATIVE REPORT    PATIENT NAME: Jignesh Graham                      :        1940  MED REC NO:   [id number]                              ROOM:  ACCOUNT NO:   [de-identified]                           ADMIT DATE: 2018  PROVIDER:     Cornelio Burris    DATE OF PROCEDURE:  2018    ATTENDING SURGEON:  Cornelio Burris MD    PCP:  Dr. Marino Madrigal. PREOPERATIVE DIAGNOSES:  1. Left lower leg open wound. 2.  Status post excision of leiomyosarcoma. POSTOPERATIVE DIAGNOSES:  1. Left lower leg open wound. 2.  Status post excision of leiomyosarcoma. OPERATIONS PERFORMED:  1. Debridement, left lower leg wound (excised diameter of approximately 10  cm x 4 cm). 2.  Application of Apligraf. ANESTHESIA:  MAC.    INDICATIONS:  The patient is a pleasant 45-year-old white male, who  recently underwent wide local excision of leiomyosarcoma. He has since had  excellent granulation with four previous applications of Apligraf. He  presents at this time for his fifth and final application of Apligraf to  the wound today. OPERATIVE PROCEDURE:  After obtaining informed consent with discussion of  risks, benefits, and alternatives, the patient was taken to the operating  theater and placed in supine position on the operating table. Following  adequate IV sedation, he was prepped and draped in a standard fashion. The  wound now measures approximately 10 x 4 cm, granulating well with excellent  peripheral epithelization. The prominent granulation tissue was shaved  with a #10 blade and sent off as specimen. Hemostasis was established with  topical application of lidocaine with epinephrine, followed by highly  selective use of Bovie cautery. A piece of Apligraf was selected and  meshed 1.5:1, then applied to the wound.   The Apligraf

## 2018-02-28 LAB — DERMATOLOGY PATHOLOGY REPORT: NORMAL

## 2018-03-06 ENCOUNTER — OFFICE VISIT (OUTPATIENT)
Dept: SURGERY | Age: 78
End: 2018-03-06

## 2018-03-06 DIAGNOSIS — C49.22 LEIOMYOSARCOMA OF LEG, LEFT (HCC): ICD-10-CM

## 2018-03-06 DIAGNOSIS — Z51.89 VISIT FOR WOUND CHECK: Primary | ICD-10-CM

## 2018-03-06 PROCEDURE — 99024 POSTOP FOLLOW-UP VISIT: CPT | Performed by: SURGERY

## 2018-03-07 VITALS — HEIGHT: 71 IN | BODY MASS INDEX: 28.56 KG/M2 | WEIGHT: 204 LBS

## 2018-03-12 ENCOUNTER — ANESTHESIA EVENT (OUTPATIENT)
Dept: OPERATING ROOM | Age: 78
End: 2018-03-12
Payer: MEDICARE

## 2018-03-12 ENCOUNTER — HOSPITAL ENCOUNTER (OUTPATIENT)
Age: 78
Setting detail: OUTPATIENT SURGERY
Discharge: HOME | End: 2018-03-12
Attending: SURGERY | Admitting: SURGERY
Payer: MEDICARE

## 2018-03-12 ENCOUNTER — ANESTHESIA (OUTPATIENT)
Dept: OPERATING ROOM | Age: 78
End: 2018-03-12
Payer: MEDICARE

## 2018-03-12 VITALS
DIASTOLIC BLOOD PRESSURE: 72 MMHG | RESPIRATION RATE: 16 BRPM | TEMPERATURE: 97.2 F | WEIGHT: 208 LBS | HEART RATE: 65 BPM | BODY MASS INDEX: 29.12 KG/M2 | OXYGEN SATURATION: 99 % | HEIGHT: 71 IN | SYSTOLIC BLOOD PRESSURE: 141 MMHG

## 2018-03-12 VITALS
RESPIRATION RATE: 13 BRPM | TEMPERATURE: 98.6 F | OXYGEN SATURATION: 100 % | DIASTOLIC BLOOD PRESSURE: 46 MMHG | SYSTOLIC BLOOD PRESSURE: 89 MMHG

## 2018-03-12 PROCEDURE — 6360000002 HC RX W HCPCS: Performed by: NURSE ANESTHETIST, CERTIFIED REGISTERED

## 2018-03-12 PROCEDURE — 2580000003 HC RX 258: Performed by: SURGERY

## 2018-03-12 PROCEDURE — 7100000011 HC PHASE II RECOVERY - ADDTL 15 MIN: Performed by: SURGERY

## 2018-03-12 PROCEDURE — 15271 SKIN SUB GRAFT TRNK/ARM/LEG: CPT | Performed by: SURGERY

## 2018-03-12 PROCEDURE — 3600000013 HC SURGERY LEVEL 3 ADDTL 15MIN: Performed by: SURGERY

## 2018-03-12 PROCEDURE — 88304 TISSUE EXAM BY PATHOLOGIST: CPT

## 2018-03-12 PROCEDURE — 7100000010 HC PHASE II RECOVERY - FIRST 15 MIN: Performed by: SURGERY

## 2018-03-12 PROCEDURE — 3700000000 HC ANESTHESIA ATTENDED CARE: Performed by: SURGERY

## 2018-03-12 PROCEDURE — 6360000002 HC RX W HCPCS: Performed by: SURGERY

## 2018-03-12 PROCEDURE — 2720000010 HC SURG SUPPLY STERILE: Performed by: SURGERY

## 2018-03-12 PROCEDURE — 3700000001 HC ADD 15 MINUTES (ANESTHESIA): Performed by: SURGERY

## 2018-03-12 PROCEDURE — 3600000003 HC SURGERY LEVEL 3 BASE: Performed by: SURGERY

## 2018-03-12 PROCEDURE — 2500000003 HC RX 250 WO HCPCS: Performed by: SURGERY

## 2018-03-12 DEVICE — NUSHIELD 4X6CM 24SQ CM: Type: IMPLANTABLE DEVICE | Status: FUNCTIONAL

## 2018-03-12 RX ORDER — PROPOFOL 10 MG/ML
INJECTION, EMULSION INTRAVENOUS PRN
Status: DISCONTINUED | OUTPATIENT
Start: 2018-03-12 | End: 2018-03-12 | Stop reason: SDUPTHER

## 2018-03-12 RX ORDER — SODIUM CHLORIDE 0.9 % (FLUSH) 0.9 %
10 SYRINGE (ML) INJECTION PRN
Status: DISCONTINUED | OUTPATIENT
Start: 2018-03-12 | End: 2018-03-12 | Stop reason: HOSPADM

## 2018-03-12 RX ORDER — MORPHINE SULFATE 1 MG/ML
1 INJECTION, SOLUTION EPIDURAL; INTRATHECAL; INTRAVENOUS
Status: DISCONTINUED | OUTPATIENT
Start: 2018-03-12 | End: 2018-03-12 | Stop reason: HOSPADM

## 2018-03-12 RX ORDER — ACETAMINOPHEN 325 MG/1
650 TABLET ORAL EVERY 4 HOURS PRN
Status: DISCONTINUED | OUTPATIENT
Start: 2018-03-12 | End: 2018-03-12 | Stop reason: HOSPADM

## 2018-03-12 RX ORDER — SODIUM CHLORIDE, SODIUM LACTATE, POTASSIUM CHLORIDE, CALCIUM CHLORIDE 600; 310; 30; 20 MG/100ML; MG/100ML; MG/100ML; MG/100ML
INJECTION, SOLUTION INTRAVENOUS CONTINUOUS
Status: DISCONTINUED | OUTPATIENT
Start: 2018-03-12 | End: 2018-03-12 | Stop reason: HOSPADM

## 2018-03-12 RX ORDER — LIDOCAINE HYDROCHLORIDE AND EPINEPHRINE 10; 10 MG/ML; UG/ML
INJECTION, SOLUTION INFILTRATION; PERINEURAL PRN
Status: DISCONTINUED | OUTPATIENT
Start: 2018-03-12 | End: 2018-03-12 | Stop reason: HOSPADM

## 2018-03-12 RX ORDER — PROPOFOL 10 MG/ML
INJECTION, EMULSION INTRAVENOUS CONTINUOUS PRN
Status: DISCONTINUED | OUTPATIENT
Start: 2018-03-12 | End: 2018-03-12 | Stop reason: SDUPTHER

## 2018-03-12 RX ORDER — SODIUM CHLORIDE 0.9 % (FLUSH) 0.9 %
10 SYRINGE (ML) INJECTION EVERY 12 HOURS SCHEDULED
Status: DISCONTINUED | OUTPATIENT
Start: 2018-03-12 | End: 2018-03-12 | Stop reason: HOSPADM

## 2018-03-12 RX ORDER — MIDAZOLAM HYDROCHLORIDE 1 MG/ML
INJECTION INTRAMUSCULAR; INTRAVENOUS PRN
Status: DISCONTINUED | OUTPATIENT
Start: 2018-03-12 | End: 2018-03-12 | Stop reason: SDUPTHER

## 2018-03-12 RX ORDER — ONDANSETRON 2 MG/ML
4 INJECTION INTRAMUSCULAR; INTRAVENOUS EVERY 6 HOURS PRN
Status: DISCONTINUED | OUTPATIENT
Start: 2018-03-12 | End: 2018-03-12 | Stop reason: HOSPADM

## 2018-03-12 RX ORDER — FENTANYL CITRATE 50 UG/ML
INJECTION, SOLUTION INTRAMUSCULAR; INTRAVENOUS PRN
Status: DISCONTINUED | OUTPATIENT
Start: 2018-03-12 | End: 2018-03-12 | Stop reason: SDUPTHER

## 2018-03-12 RX ADMIN — SODIUM CHLORIDE, POTASSIUM CHLORIDE, SODIUM LACTATE AND CALCIUM CHLORIDE: 600; 310; 30; 20 INJECTION, SOLUTION INTRAVENOUS at 09:10

## 2018-03-12 RX ADMIN — FENTANYL CITRATE 100 MCG: 50 INJECTION, SOLUTION INTRAMUSCULAR; INTRAVENOUS at 12:08

## 2018-03-12 RX ADMIN — CEFAZOLIN SODIUM 2 G: 2 SOLUTION INTRAVENOUS at 12:09

## 2018-03-12 RX ADMIN — MIDAZOLAM 2 MG: 1 INJECTION INTRAMUSCULAR; INTRAVENOUS at 12:02

## 2018-03-12 RX ADMIN — PROPOFOL 75 MCG/KG/MIN: 10 INJECTION, EMULSION INTRAVENOUS at 12:05

## 2018-03-12 RX ADMIN — PROPOFOL 30 MG: 10 INJECTION, EMULSION INTRAVENOUS at 12:10

## 2018-03-12 ASSESSMENT — PAIN DESCRIPTION - LOCATION: LOCATION: LEG

## 2018-03-12 ASSESSMENT — PAIN DESCRIPTION - DESCRIPTORS: DESCRIPTORS: SORE;BURNING

## 2018-03-12 ASSESSMENT — PAIN - FUNCTIONAL ASSESSMENT: PAIN_FUNCTIONAL_ASSESSMENT: 0-10

## 2018-03-12 ASSESSMENT — PAIN SCALES - GENERAL
PAINLEVEL_OUTOF10: 0
PAINLEVEL_OUTOF10: 1
PAINLEVEL_OUTOF10: 2

## 2018-03-12 ASSESSMENT — PAIN DESCRIPTION - ORIENTATION: ORIENTATION: LEFT;LOWER

## 2018-03-12 ASSESSMENT — PAIN DESCRIPTION - PAIN TYPE: TYPE: SURGICAL PAIN

## 2018-03-12 NOTE — H&P
HPI     Mr Teri Mendoza returns for wound debridement after re excision L lower leg leiomyosarcoma biopsy site Nov 22, 2017, with Apligraf application 3:1 split thickness. Debridement with reapplication last week.  Doing well.  No fevers nor chills.  No pain.     Review of Systems   Constitutional: Negative for chills, diaphoresis and fever. HENT: Negative for nosebleeds and sore throat.    Eyes: Negative for redness. Respiratory: Negative for cough.    Cardiovascular: Negative for chest pain, palpitations and leg swelling. Gastrointestinal: Negative for abdominal pain, blood in stool and nausea. Genitourinary: Negative for dysuria, flank pain and hematuria. Musculoskeletal: Negative for back pain. Neurological: Negative for dizziness, focal weakness, seizures and weakness. Endo/Heme/Allergies: Does not bruise/bleed easily.         Past Medical History           Past Medical History:   Diagnosis Date    Cancer [facility])       left lower leg    COPD (chronic obstructive pulmonary disease) (HCC)      Hyperlipidemia      Hypertension      Migraine              Past Surgical History             Past Surgical History:   Procedure Laterality Date    EXCISION / BIOPSY SKIN LESION OF LEG Left 11/13/2017     LEG LESION BIOPSY EXCISION-EXCISION LEFT LEG LESION performed by El Ruggiero MD at [place] [address] / BIOPSY SKIN LESION OF LEG   11/22/2017     LEG LESION BIOPSY EXCISION performed by El Ruggiero MD at [facility]        SKIN GRAFT Left 11/22/2017     SKIN GRAFT SPLIT THICKNESS performed by El Ruggiero MD at [place] [address]   History reviewed.  No pertinent family history.         Allergies:  See list     Current Facility-Administered Medications                     Current Facility-Administered Medications   Medication Dose Route Frequency Provider Last Rate Last Dose    lactated ringers infusion   Intravenous  Granulating well.   Psychiatric: He has a normal mood and affect.  His behavior is normal. Judgment and thought content normal.   Nursing note and vitals reviewed.     Assessment:     69 yo WM s/p excision L lower leg leiomyosarcoma with granulating wound.     Plan:     Will proceed with debridement and dressing change.  Risks, benefits, alternatives thoroughly reviewed and accepted by Mr Terrance Disla

## 2018-03-12 NOTE — ANESTHESIA PRE PROCEDURE
VISIT,PROCEDURE ONLY Left 1/24/2018    LEG DEBRIDEMENT SKIN GRAFT--LEFT LEG DEBRIDEMENT WITH APLIGRAFT APPLICATION performed by Lorri Arora MD at [address] [facility] OFFICE/OUTPT VISIT,PROCEDURE ONLY Left 2/1/2018    LEG ULCER DEBRIDEMENT APPLICATION OF APLIGRAF performed by Lorri Arora MD at [address] [facility] OFFICE/OUTPT VISIT,PROCEDURE ONLY Left 2/12/2018    LEG ULCER DEBRIDEMENT APPLICATION OF APLIGRAF----Application Apligraf ,Debridement Left Lower Leg Wound performed by Lorri Arora MD at [address] [facility] OFFICE/OUTPT VISIT,PROCEDURE ONLY Left 2/26/2018    LEG ULCER DEBRIDEMENT APPLICATION OF APLIGRAF performed by Lorri Arora MD at [address] Left 11/22/2017    SKIN GRAFT SPLIT THICKNESS performed by Lorri Arora MD at [address] [facility]         Social History:    Social History   Substance Use Topics    Smoking status: Former Smoker     Quit date: 8/15/2003    Smokeless tobacco: Never Used    Alcohol use 0.6 oz/week     1 Cans of beer per week      Comment: occasional                                Counseling given: Not Answered      Vital Signs (Current):   Vitals:    03/12/18 0915   BP: (!) 186/83   Pulse: 70   Resp: 16   Temp: 36.7 °C (98 °F)   TempSrc: Temporal   SpO2: 99%   Weight: 208 lb (94.3 kg)   Height: 5' 11\" (1.803 m)                                              BP Readings from Last 3 Encounters:   03/12/18 (!) 186/83   02/26/18 (!) 155/72   02/26/18 (!) 110/54       NPO Status: Time of last liquid consumption: 2225                        Time of last solid consumption: 1800                        Date of last liquid consumption: 03/11/18                        Date of last solid food consumption: 03/11/18    BMI:   Wt Readings from Last 3 Encounters:   03/12/18 208 lb (94.3 kg)   03/06/18 204 lb (92.5 kg)   02/26/18 204 lb (92.5 kg)     Body mass index is 29.01 kg/m².     CBC:   Lab Results   Component Value Date    WBC 8.3 05/05/2017    RBC 4.84 05/05/2017 HGB 15.0 05/05/2017    HCT 45.7 05/05/2017    MCV 94.3 05/05/2017    RDW 12.5 05/05/2017     05/05/2017       CMP:   Lab Results   Component Value Date     05/05/2017    K 5.0 05/05/2017     05/05/2017    CO2 28 05/05/2017    BUN 19 05/05/2017    CREATININE 0.94 05/05/2017    GFRAA >60 05/05/2017    LABGLOM >60 05/05/2017    GLUCOSE 118 05/05/2017    PROT 7.7 05/05/2017    CALCIUM 9.7 05/05/2017    BILITOT 0.51 05/05/2017    ALKPHOS 74 05/05/2017    AST 20 05/05/2017    ALT 19 05/05/2017       POC Tests: No results for input(s): POCGLU, POCNA, POCK, POCCL, POCBUN, POCHEMO, POCHCT in the last 72 hours. Coags: No results found for: PROTIME, INR, APTT    HCG (If Applicable): No results found for: PREGTESTUR, PREGSERUM, HCG, HCGQUANT     ABGs: No results found for: PHART, PO2ART, CIO6KFO, MDZ5TFF, BEART, X8SIEBFL     Type & Screen (If Applicable):  No results found for: LABABO, [address]    Anesthesia Evaluation  Patient summary reviewed and Nursing notes reviewed no history of anesthetic complications:   Airway: Mallampati: II  TM distance: >3 FB   Neck ROM: full  Mouth opening: > = 3 FB Dental:    (+) edentulous      Pulmonary:normal exam  breath sounds clear to auscultation  (+) COPD:                             Cardiovascular:    (+) hypertension:,       ECG reviewed  Rhythm: regular  Rate: normal                    Neuro/Psych:   (+) headaches:, psychiatric history:            GI/Hepatic/Renal: Neg GI/Hepatic/Renal ROS            Endo/Other: Negative Endo/Other ROS                    Abdominal:           Vascular: negative vascular ROS. Anesthesia Plan      MAC     ASA 3       Induction: intravenous. Anesthetic plan and risks discussed with patient. Plan discussed with attending.                   Carter Gonzalez CRNA   3/12/2018

## 2018-03-12 NOTE — PROGRESS NOTES

## 2018-03-12 NOTE — ANESTHESIA POSTPROCEDURE EVALUATION
Department of Anesthesiology  Postprocedure Note    Patient: Bhavna Simms  MRN: [id number]  YOB: 1940  Date of evaluation: 3/12/2018  Time:  12:47 PM     Procedure Summary     Date:  03/12/18 Room / Location:  [address] [place] [address] 01 / Carey Min OR    Anesthesia Start:  1205 Anesthesia Stop:  4934    Procedure:  LEG ULCER DEBRIDEMENT APPLICATION OF NUSHIELD LEFT LOWER LEG (Left ) Diagnosis:  (LEFT LOWER LEG OPEN WOUND, S/P EXCISION LEIOMYOSARCOMA)    Surgeon:  Brock Wlels MD Responsible Provider:  Aly Oneal CRNA    Anesthesia Type:  MAC ASA Status:  3          Anesthesia Type: MAC    Mariposa Phase I: Mariposa Score: 10    Mariposa Phase II: Mariposa Score: 10    Last vitals: Reviewed and per EMR flowsheets.        Anesthesia Post Evaluation    Patient location during evaluation: bedside  Patient participation: complete - patient participated  Level of consciousness: awake and alert  Pain score: 0  Airway patency: patent  Nausea & Vomiting: no nausea and no vomiting  Complications: no  Cardiovascular status: hemodynamically stable  Respiratory status: acceptable  Hydration status: euvolemic

## 2018-03-13 NOTE — OP NOTE
[facility]                              [address] [address]                                 OPERATIVE REPORT    PATIENT NAME: Jignesh Graham                      :        1940  MED REC NO:   [id number]                              ROOM:  ACCOUNT NO:   [de-identified]                           ADMIT DATE: 2018  PROVIDER:     Cornelio Burris    DATE OF PROCEDURE:  2018    ATTENDING SURGEON:  Cornelio Burris MD    PCP:  Dr. Marino Madrigal. PREOPERATIVE DIAGNOSES:  1. Left lower leg open wound. 2.  Status post excision leiomyosarcoma. POSTOPERATIVE DIAGNOSES:  1. Left lower leg open wound. 2.  Status post excision leiomyosarcoma. OPERATIONS PERFORMED:  1. Debridement, left lower leg open wound. 2.  Application of NuShield x2. ANESTHESIA:  MAC.    INDICATIONS:  The patient is a pleasant 15-year-old white male, who is  status post wide local excision of leiomyosarcoma in the left lower  extremity. He had multiple debridements with application of Apligraf x5. He returns today for repeat debridement and application of NuShield. OPERATIVE PROCEDURE:  After obtaining informed consent with discussion of  risks, benefits, and alternatives including remote risks of bleeding,  infection etc., the patient was taken to the operating theater and placed  in supine position on the operating table. Following adequate IV sedation,  he was prepped and draped in the standard fashion. The left lower leg was  debrided off hypertrophic granulation tissue. The wound measurements are  approximately 10 x 4.5 cm with good ingrowth of epithelial tissue. Hemostasis was established with selective use of cautery as well as topical  application of lidocaine with epinephrine. Two 4 x 6 cm NuShield placental  allografts were selected.   They are appropriately oriented and placed  directly on the wound and rehydrated with sterile

## 2018-03-14 LAB — SURGICAL PATHOLOGY REPORT: NORMAL

## 2018-03-15 ENCOUNTER — ANESTHESIA EVENT (OUTPATIENT)
Dept: OPERATING ROOM | Age: 78
End: 2018-03-15
Payer: MEDICARE

## 2018-03-19 ENCOUNTER — ANESTHESIA (OUTPATIENT)
Dept: OPERATING ROOM | Age: 78
End: 2018-03-19
Payer: MEDICARE

## 2018-03-19 ENCOUNTER — HOSPITAL ENCOUNTER (OUTPATIENT)
Age: 78
Setting detail: OUTPATIENT SURGERY
Discharge: HOME | End: 2018-03-19
Attending: SURGERY | Admitting: SURGERY
Payer: MEDICARE

## 2018-03-19 VITALS
OXYGEN SATURATION: 100 % | DIASTOLIC BLOOD PRESSURE: 45 MMHG | TEMPERATURE: 98.6 F | RESPIRATION RATE: 17 BRPM | SYSTOLIC BLOOD PRESSURE: 107 MMHG

## 2018-03-19 VITALS
HEART RATE: 62 BPM | TEMPERATURE: 98 F | SYSTOLIC BLOOD PRESSURE: 141 MMHG | DIASTOLIC BLOOD PRESSURE: 64 MMHG | BODY MASS INDEX: 30.13 KG/M2 | OXYGEN SATURATION: 99 % | RESPIRATION RATE: 18 BRPM | WEIGHT: 215.2 LBS | HEIGHT: 71 IN

## 2018-03-19 PROCEDURE — 97598 DBRDMT OPN WND ADDL 20CM/<: CPT | Performed by: SURGERY

## 2018-03-19 PROCEDURE — 3700000000 HC ANESTHESIA ATTENDED CARE: Performed by: SURGERY

## 2018-03-19 PROCEDURE — 6370000000 HC RX 637 (ALT 250 FOR IP): Performed by: SURGERY

## 2018-03-19 PROCEDURE — 88304 TISSUE EXAM BY PATHOLOGIST: CPT

## 2018-03-19 PROCEDURE — 2580000003 HC RX 258: Performed by: SURGERY

## 2018-03-19 PROCEDURE — 2500000003 HC RX 250 WO HCPCS: Performed by: SURGERY

## 2018-03-19 PROCEDURE — 97597 DBRDMT OPN WND 1ST 20 CM/<: CPT | Performed by: SURGERY

## 2018-03-19 PROCEDURE — 7100000011 HC PHASE II RECOVERY - ADDTL 15 MIN: Performed by: SURGERY

## 2018-03-19 PROCEDURE — 6360000002 HC RX W HCPCS: Performed by: NURSE ANESTHETIST, CERTIFIED REGISTERED

## 2018-03-19 PROCEDURE — 2720000010 HC SURG SUPPLY STERILE: Performed by: SURGERY

## 2018-03-19 PROCEDURE — 3700000001 HC ADD 15 MINUTES (ANESTHESIA): Performed by: SURGERY

## 2018-03-19 PROCEDURE — 3600000012 HC SURGERY LEVEL 2 ADDTL 15MIN: Performed by: SURGERY

## 2018-03-19 PROCEDURE — 6360000002 HC RX W HCPCS: Performed by: SURGERY

## 2018-03-19 PROCEDURE — 3600000002 HC SURGERY LEVEL 2 BASE: Performed by: SURGERY

## 2018-03-19 PROCEDURE — 15271 SKIN SUB GRAFT TRNK/ARM/LEG: CPT | Performed by: SURGERY

## 2018-03-19 PROCEDURE — 7100000010 HC PHASE II RECOVERY - FIRST 15 MIN: Performed by: SURGERY

## 2018-03-19 PROCEDURE — 2500000003 HC RX 250 WO HCPCS: Performed by: NURSE ANESTHETIST, CERTIFIED REGISTERED

## 2018-03-19 DEVICE — NUSHIELD 4X6CM 24SQ CM: Type: IMPLANTABLE DEVICE | Site: LEG | Status: FUNCTIONAL

## 2018-03-19 RX ORDER — ONDANSETRON 2 MG/ML
4 INJECTION INTRAMUSCULAR; INTRAVENOUS EVERY 6 HOURS PRN
Status: DISCONTINUED | OUTPATIENT
Start: 2018-03-19 | End: 2018-03-19 | Stop reason: HOSPADM

## 2018-03-19 RX ORDER — ACETAMINOPHEN 325 MG/1
650 TABLET ORAL EVERY 4 HOURS PRN
Status: DISCONTINUED | OUTPATIENT
Start: 2018-03-19 | End: 2018-03-19 | Stop reason: HOSPADM

## 2018-03-19 RX ORDER — SODIUM CHLORIDE 0.9 % (FLUSH) 0.9 %
10 SYRINGE (ML) INJECTION PRN
Status: DISCONTINUED | OUTPATIENT
Start: 2018-03-19 | End: 2018-03-19 | Stop reason: HOSPADM

## 2018-03-19 RX ORDER — MORPHINE SULFATE 1 MG/ML
1 INJECTION, SOLUTION EPIDURAL; INTRATHECAL; INTRAVENOUS
Status: DISCONTINUED | OUTPATIENT
Start: 2018-03-19 | End: 2018-03-19 | Stop reason: HOSPADM

## 2018-03-19 RX ORDER — SODIUM CHLORIDE, SODIUM LACTATE, POTASSIUM CHLORIDE, CALCIUM CHLORIDE 600; 310; 30; 20 MG/100ML; MG/100ML; MG/100ML; MG/100ML
INJECTION, SOLUTION INTRAVENOUS CONTINUOUS
Status: DISCONTINUED | OUTPATIENT
Start: 2018-03-19 | End: 2018-03-19 | Stop reason: HOSPADM

## 2018-03-19 RX ORDER — SODIUM CHLORIDE 0.9 % (FLUSH) 0.9 %
10 SYRINGE (ML) INJECTION EVERY 12 HOURS SCHEDULED
Status: DISCONTINUED | OUTPATIENT
Start: 2018-03-19 | End: 2018-03-19 | Stop reason: HOSPADM

## 2018-03-19 RX ORDER — LIDOCAINE HYDROCHLORIDE AND EPINEPHRINE 10; 10 MG/ML; UG/ML
INJECTION, SOLUTION INFILTRATION; PERINEURAL PRN
Status: DISCONTINUED | OUTPATIENT
Start: 2018-03-19 | End: 2018-03-19 | Stop reason: HOSPADM

## 2018-03-19 RX ORDER — MIDAZOLAM HYDROCHLORIDE 1 MG/ML
INJECTION INTRAMUSCULAR; INTRAVENOUS PRN
Status: DISCONTINUED | OUTPATIENT
Start: 2018-03-19 | End: 2018-03-19 | Stop reason: SDUPTHER

## 2018-03-19 RX ORDER — LIDOCAINE HYDROCHLORIDE 10 MG/ML
INJECTION, SOLUTION EPIDURAL; INFILTRATION; INTRACAUDAL; PERINEURAL PRN
Status: DISCONTINUED | OUTPATIENT
Start: 2018-03-19 | End: 2018-03-19 | Stop reason: SDUPTHER

## 2018-03-19 RX ORDER — PROPOFOL 10 MG/ML
INJECTION, EMULSION INTRAVENOUS CONTINUOUS PRN
Status: DISCONTINUED | OUTPATIENT
Start: 2018-03-19 | End: 2018-03-19 | Stop reason: SDUPTHER

## 2018-03-19 RX ADMIN — PROPOFOL 100 MCG/KG/MIN: 10 INJECTION, EMULSION INTRAVENOUS at 12:27

## 2018-03-19 RX ADMIN — SODIUM CHLORIDE, POTASSIUM CHLORIDE, SODIUM LACTATE AND CALCIUM CHLORIDE: 600; 310; 30; 20 INJECTION, SOLUTION INTRAVENOUS at 09:59

## 2018-03-19 RX ADMIN — MIDAZOLAM HYDROCHLORIDE 2 MG: 2 INJECTION, SOLUTION INTRAMUSCULAR; INTRAVENOUS at 12:25

## 2018-03-19 RX ADMIN — ACETAMINOPHEN 650 MG: 325 TABLET, FILM COATED ORAL at 13:20

## 2018-03-19 RX ADMIN — SODIUM CHLORIDE, POTASSIUM CHLORIDE, SODIUM LACTATE AND CALCIUM CHLORIDE: 600; 310; 30; 20 INJECTION, SOLUTION INTRAVENOUS at 10:04

## 2018-03-19 RX ADMIN — LIDOCAINE HYDROCHLORIDE 5 ML: 10 INJECTION, SOLUTION EPIDURAL; INFILTRATION; INTRACAUDAL; PERINEURAL at 12:27

## 2018-03-19 ASSESSMENT — PAIN - FUNCTIONAL ASSESSMENT: PAIN_FUNCTIONAL_ASSESSMENT: 0-10

## 2018-03-19 ASSESSMENT — PAIN SCALES - GENERAL
PAINLEVEL_OUTOF10: 5
PAINLEVEL_OUTOF10: 0

## 2018-03-19 ASSESSMENT — COPD QUESTIONNAIRES: CAT_SEVERITY: NO INTERVAL CHANGE

## 2018-03-19 NOTE — ANESTHESIA PRE PROCEDURE
Department of Anesthesiology  Preprocedure Note       Name:  Bhavna Simms   Age:  68 y.o.  :  1940                                          MRN:  [id number]         Date:  3/19/2018      Surgeon: Carlo Lucas):  Brock Wells MD    Procedure: Procedure(s):  LEG ULCER DEBRIDEMENT APPLICATION OF [facility]    Medications prior to admission:   Prior to Admission medications    Medication Sig Start Date End Date Taking? Authorizing Provider   Probiotic Product (PROBIOTIC PO) Take 1 tablet by mouth daily    Historical Provider, MD   Naproxen Sodium (ALEVE) 220 MG CAPS Take by mouth every 6 hours as needed     Historical Provider, MD   lisinopril (ZESTRIL) 30 MG tablet Take 1 tablet by mouth daily 10/9/17   Eris Catching, DO   lovastatin (MEVACOR) 40 MG tablet Take 1 tablet by mouth nightly 10/9/17   Eris Catching, DO   propranolol (INDERAL LA) 80 MG extended release capsule Take 1 capsule by mouth daily 10/9/17   Eris Catching, DO   sertraline (ZOLOFT) 50 MG tablet Take 1 tablet by mouth daily 10/9/17   Eris Catching, DO   amLODIPine (NORVASC) 5 MG tablet Take 1 tablet by mouth 2 times daily 10/9/17   Eris Catching, DO   tiotropium (SPIRIVA HANDIHALER) 18 MCG inhalation capsule Inhale 1 capsule into the lungs daily 10/9/17   Eris Catching, DO   Multiple Vitamin (MULTI VITAMIN DAILY PO) Take by mouth daily    Historical Provider, MD   albuterol (PROVENTIL) (2.5 MG/3ML) 0.083% nebulizer solution Take 3 mLs by nebulization every 6 hours as needed for Wheezing 17   Carlos Manuel Vazquez MD   albuterol sulfate HFA (PROAIR HFA) 108 (90 BASE) MCG/ACT inhaler Inhale 2 puffs into the lungs every 6 hours as needed for Wheezing 3/30/16   Eris Catching, DO   Psyllium (METAMUCIL FIBER SINGLES PO) Take  by mouth daily. Historical Provider, MD   Docusate Calcium (STOOL SOFTENER PO) Take  by mouth daily. Historical Provider, MD   aspirin 81 MG EC tablet Take 81 mg by mouth daily.       Historical Provider, MD Current medications:    Current Facility-Administered Medications   Medication Dose Route Frequency Provider Last Rate Last Dose    lactated ringers infusion   Intravenous Continuous Leo Oliveira  mL/hr at 03/19/18 1004      sodium chloride flush 0.9 % injection 10 mL  10 mL Intravenous 2 times per day Leo Oliveira MD        sodium chloride flush 0.9 % injection 10 mL  10 mL Intravenous PRN Leo Oliveira MD        ceFAZolin (ANCEF) 2 g in dextrose 3 % 50 mL IVPB (duplex)  2 g Intravenous On Call to [address] Haroldo Gamboa MD         Facility-Administered Medications Ordered in Other Encounters   Medication Dose Route Frequency Provider Last Rate Last Dose    lidocaine-EPINEPHrine 2 percent-1:383431 injection    PRN Leo Oliveira MD   10 mL at 02/01/18 1358       Allergies:  No Known Allergies    Problem List:    Patient Active Problem List   Diagnosis Code    Classical migraine G43. 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    Hypertension I10    Open wound of left lower leg S81.802A       Past Medical History:        Diagnosis Date    Cancer ([address]     left lower leg    COPD (chronic obstructive pulmonary disease) (HCC)     Hyperlipidemia     Hypertension     Migraine        Past Surgical History:        Procedure Laterality Date    EXCISION / BIOPSY SKIN LESION OF LEG Left 11/13/2017    LEG LESION BIOPSY EXCISION-EXCISION LEFT LEG LESION performed by Leo Oliveira MD at [place] [address] / BIOPSY SKIN LESION OF LEG  11/22/2017    LEG LESION BIOPSY EXCISION performed by Leo [facility] MD at [facility]      MO OFFICE/OUTPT VISIT,PROCEDURE ONLY Left 1/24/2018    LEG (92.5 kg)     Body mass index is 30.01 kg/m². CBC:   Lab Results   Component Value Date    WBC 8.3 05/05/2017    RBC 4.84 05/05/2017    HGB 15.0 05/05/2017    HCT 45.7 05/05/2017    MCV 94.3 05/05/2017    RDW 12.5 05/05/2017     05/05/2017       CMP:   Lab Results   Component Value Date     05/05/2017    K 5.0 05/05/2017     05/05/2017    CO2 28 05/05/2017    BUN 19 05/05/2017    CREATININE 0.94 05/05/2017    GFRAA >60 05/05/2017    LABGLOM >60 05/05/2017    GLUCOSE 118 05/05/2017    PROT 7.7 05/05/2017    CALCIUM 9.7 05/05/2017    BILITOT 0.51 05/05/2017    ALKPHOS 74 05/05/2017    AST 20 05/05/2017    ALT 19 05/05/2017       POC Tests: No results for input(s): POCGLU, POCNA, POCK, POCCL, POCBUN, POCHEMO, POCHCT in the last 72 hours. Coags: No results found for: PROTIME, INR, APTT    HCG (If Applicable): No results found for: PREGTESTUR, PREGSERUM, HCG, HCGQUANT     ABGs: No results found for: PHART, PO2ART, QHQ6LRS, XMS4VTP, BEART, H8RELEKJ     Type & Screen (If Applicable):  No results found for: LABABO, [address]    Anesthesia Evaluation  Patient summary reviewed and Nursing notes reviewed no history of anesthetic complications:   Airway: Mallampati: II  TM distance: >3 FB   Neck ROM: full  Mouth opening: > = 3 FB Dental:    (+) edentulous      Pulmonary:normal exam    (+) COPD: no interval change,                             Cardiovascular:    (+) hypertension: no interval change,       ECG reviewed               Beta Blocker:  Dose within 24 Hrs         Neuro/Psych:   (+) headaches: migraine headaches, psychiatric history:            GI/Hepatic/Renal: Neg GI/Hepatic/Renal ROS            Endo/Other:    (+) malignancy/cancer. Pt had PAT visit. Abdominal:           Vascular:                                      Anesthesia Plan      MAC     ASA 3             Anesthetic plan and risks discussed with patient. Plan discussed with attending.                   Jacquie Cline

## 2018-03-19 NOTE — OP NOTE
They were held in place with  Steri-Strips. A nonadherent dressing was then placed over the wound  followed by fluffed Kerlix and a compression stocking. All sponge, needle,  and instrument counts were correct at the end of the case. The patient  tolerated the procedure well and was transferred to PACU in stable  condition. SPECIMENS:  Hypertrophic granulation tissue, left lower leg. IMPLANTS:  1. NuShield 4 x 6 cm, donor [id number], ID #[id number]. 2.  NuShield 4 x 6 cm, donor #[id number], ID #[id number]. COMPLICATIONS:  None. DISPOSITION:  To PACU awake, alert, and stable. Following recovery, we  will discharge the patient home with gradual advancement of diet and  activity as tolerated with instructions for leg elevation when sitting. Followup will be with me in one week for redebridement and wound  application, and with Dr. Patrick Liz at his next regular appointment. ELADIO Warren    D: 03/19/2018 13:08:17       T: 03/19/2018 15:00:46     [email or web address]  Job#: [id number]     Doc#: [id number]    CC:  Deon Roberts

## 2018-03-19 NOTE — BRIEF OP NOTE
Brief Postoperative Note  ______________________________________________________________    Patient: Opal Estimable Megha  YOB: 1940  MRN: [id number]  Date of Procedure: 3/19/2018    Pre-Op Diagnosis:                                             1.  L lower leg open wound                                            2.  S/P excision leiomyosarcoma     Post-Op Diagnosis:                                             1.  L lower leg open wound                                             2.  S/P excision leiomyosarcoma       Procedure(s):                                            1.  Debridement L lower leg open wound 10 cm x 4.5 cm                                            2.  Application of NuShield x2 (4 x 6 cm)     Anesthesia: Monitor Anesthesia Care     Surgeon(s):  Leonor Pope MD     Staff:  Scrub Person First:  Oscar Soto     Estimated Blood Loss: Less than 85KY     Complications: None     Specimens:                               1.  L lower leg granulation tissue     Implants:                              1.  NuShield 4x6 cm Donor [id number]  ID [id number]                              2.  NuShield 4x6 cm Donor [id number]  ID [id number]     Findings:   As above.     Dictated # [id number]     Dictated # [id number]  Leonor Pope MD  Date: 3/19/2018  Time: 10:39 AM

## 2018-03-19 NOTE — H&P
leg wound.  Granulating well.   Psychiatric: He has a normal mood and affect.  His behavior is normal. Judgment and thought content normal.   Nursing note and vitals reviewed.     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     Plan:     Will proceed with debridement and application of NuShield.  Risks, benefits, alternatives thoroughly reviewed and accepted by Mr Alden Langford

## 2018-03-21 ENCOUNTER — INITIAL CONSULT (OUTPATIENT)
Dept: ONCOLOGY | Age: 78
End: 2018-03-21
Payer: MEDICARE

## 2018-03-21 VITALS
BODY MASS INDEX: 30.24 KG/M2 | WEIGHT: 216 LBS | RESPIRATION RATE: 18 BRPM | HEART RATE: 65 BPM | DIASTOLIC BLOOD PRESSURE: 80 MMHG | HEIGHT: 71 IN | SYSTOLIC BLOOD PRESSURE: 138 MMHG

## 2018-03-21 DIAGNOSIS — C49.22 LEIOMYOSARCOMA OF LEG, LEFT (HCC): Primary | ICD-10-CM

## 2018-03-21 LAB — SURGICAL PATHOLOGY REPORT: NORMAL

## 2018-03-21 PROCEDURE — G8417 CALC BMI ABV UP PARAM F/U: HCPCS | Performed by: INTERNAL MEDICINE

## 2018-03-21 PROCEDURE — 1036F TOBACCO NON-USER: CPT | Performed by: INTERNAL MEDICINE

## 2018-03-21 PROCEDURE — G8482 FLU IMMUNIZE ORDER/ADMIN: HCPCS | Performed by: INTERNAL MEDICINE

## 2018-03-21 PROCEDURE — 4040F PNEUMOC VAC/ADMIN/RCVD: CPT | Performed by: INTERNAL MEDICINE

## 2018-03-21 PROCEDURE — 1123F ACP DISCUSS/DSCN MKR DOCD: CPT | Performed by: INTERNAL MEDICINE

## 2018-03-21 PROCEDURE — G8427 DOCREV CUR MEDS BY ELIG CLIN: HCPCS | Performed by: INTERNAL MEDICINE

## 2018-03-21 PROCEDURE — 99204 OFFICE O/P NEW MOD 45 MIN: CPT | Performed by: INTERNAL MEDICINE

## 2018-03-21 ASSESSMENT — ENCOUNTER SYMPTOMS
DIARRHEA: 0
EYE REDNESS: 0
ABDOMINAL PAIN: 0
BLOOD IN STOOL: 0
TROUBLE SWALLOWING: 0
EYE DISCHARGE: 0
COUGH: 0
SORE THROAT: 0
CONSTIPATION: 0
COLOR CHANGE: 0
CHEST TIGHTNESS: 0
WHEEZING: 0
SHORTNESS OF BREATH: 0
VOMITING: 0
NAUSEA: 0
VOICE CHANGE: 0
EYE ITCHING: 0

## 2018-03-21 NOTE — PROGRESS NOTES
[facility]  [facility] [place]  [place] [address]  Dept: [phone]  Dept Fax: [phone]  Kavon Magana is a 68 y.o. male who presents today for follow up of his   Chief Complaint   Patient presents with    New Patient     Patient referred by Dr Haroldo Gamboa for leiomyosarcoma of left leg. JALEEL Givens is a 15-year-old male who has had small nodule on his left leg since he was a child. Over the last couple of years he stays that it  has been growing. He had it surgically removed and the pathology came back as leiomyosarcoma . He has had complete excision of the tumor and has had a skin graft. At the present time he is dealing with the wound healing. He otherwise feels pretty good and has no  Complaints. Current Outpatient Prescriptions   Medication Sig Dispense Refill    Probiotic Product (PROBIOTIC PO) Take 1 tablet by mouth daily      Naproxen Sodium (ALEVE) 220 MG CAPS Take by mouth every 6 hours as needed       lisinopril (ZESTRIL) 30 MG tablet Take 1 tablet by mouth daily 90 tablet 3    lovastatin (MEVACOR) 40 MG tablet Take 1 tablet by mouth nightly 90 tablet 3    propranolol (INDERAL LA) 80 MG extended release capsule Take 1 capsule by mouth daily 90 capsule 3    sertraline (ZOLOFT) 50 MG tablet Take 1 tablet by mouth daily 90 tablet 3    amLODIPine (NORVASC) 5 MG tablet Take 1 tablet by mouth 2 times daily 180 tablet 3    Multiple Vitamin (MULTI VITAMIN DAILY PO) Take by mouth daily      Psyllium (METAMUCIL FIBER SINGLES PO) Take  by mouth daily.  aspirin 81 MG EC tablet Take 81 mg by mouth daily.         tiotropium (SPIRIVA HANDIHALER) 18 MCG inhalation capsule Inhale 1 capsule into the lungs daily 90 capsule 3    albuterol (PROVENTIL) (2.5 MG/3ML) 0.083% nebulizer solution Take 3 mLs by nebulization every 6 hours as needed for Wheezing 120 each 11    albuterol sulfate HFA (PROAIR HFA) 108 (90 BASE) MCG/ACT inhaler Inhale 2 puffs into regular rhythm. No murmur heard. Pulmonary/Chest: Effort normal and breath sounds normal. No respiratory distress. He has no wheezes. He has no rales. Abdominal: Soft. He exhibits no mass. There is no hepatosplenomegaly. There is no tenderness. Musculoskeletal: He exhibits no edema or tenderness. Lymphadenopathy:     He has no cervical adenopathy. He has no axillary adenopathy. Neurological: He is alert and oriented to person, place, and time. No cranial nerve deficit. Skin: Skin is warm and dry. No cyanosis. Nails show no clubbing. Psychiatric: He has a normal mood and affect. His behavior is normal. Thought content normal.   Nursing note and vitals reviewed. Results for orders placed or performed during the hospital encounter of 03/19/18   Surgical Pathology   Result Value Ref Range    Surgical Pathology Report       (NOTE)  [address], [place] [address]. [facility], [address]  [phone]  Fax: [phone]    3 [facility]    Patient Name: Lakeisha Pierson Med Rec: [id number]  Path Number: [id number]  Collected: 3/19/2018  Received: 3/20/2018  Reported: 3/21/2018 13:58    -- Diagnosis --  LEFT LEG, WOUND, DEBRIDEMENT:  - SKIN AND SUBCUTANEOUS TISSUE DEMONSTRATING CHRONIC ACTIVE  ULCERATION, SEVERE ACUTE AND CHRONIC INFLAMMATION WITH ORGANIZING  GRANULATION TISSUE AND FIBROSIS, EDEMA, FOCAL NECROSIS, AND SQUAMOUS  EPITHELIAL HYPERPLASIA. - NEGATIVE FOR MALIGNANCY. Paulino Gregg.  Jorden Carter,  **Electronically Signed Out**         sls/3/21/2018      Clinical Information  Pre-op Diagnosis:  LEFT LOWER LEG OPEN WOUND, EXCISION LEIOMYOSARCOMA   Operative Findings:  LEFT LEG WOUND TISSUE  Operation Performed:  DEBRIDEMENT APPLICATION OF [facility]    Source of Specimen  1: LEFT LEG WOUND TISSUE    Gross Desc ription  \"RU CRUMP, LEFT LEG WOUND TISSUE\" Fragments of granular tan tissue,  5.5 x 3.5 x

## 2018-03-21 NOTE — LETTER
3/21/2018     Roula Mark DO   [address]  [address] [address]    Dear Dr. Roula Mark DO, and Dr. Roberto Blackman ref. provider found: Thank you for referring Vasiliy Martinez, 1940, to me for evaluation. Below are the relevant portions of my assessment and plan of care. [facility]  [facility] [place] [place] [place] [address]  Dept: [phone]  Dept Fax: [phone]  Janel Limon is a 68 y.o. male who presents today for follow up of his   Chief Complaint   Patient presents with    New Patient     Patient referred by Dr Zechariah Montelongo for leiomyosarcoma of left leg. JALEEL Dye is a 58-year-old male who has had small nodule on his left leg since he was a child. Over the last couple of years he stays that it  has been growing. He had it surgically removed and the pathology came back as leiomyosarcoma . He has had complete excision of the tumor and has had a skin graft. At the present time he is dealing with the wound healing. He otherwise feels pretty good and has no  Complaints. Current Outpatient Prescriptions   Medication Sig Dispense Refill    Probiotic Product (PROBIOTIC PO) Take 1 tablet by mouth daily      Naproxen Sodium (ALEVE) 220 MG CAPS Take by mouth every 6 hours as needed       lisinopril (ZESTRIL) 30 MG tablet Take 1 tablet by mouth daily 90 tablet 3    lovastatin (MEVACOR) 40 MG tablet Take 1 tablet by mouth nightly 90 tablet 3    propranolol (INDERAL LA) 80 MG extended release capsule Take 1 capsule by mouth daily 90 capsule 3    sertraline (ZOLOFT) 50 MG tablet Take 1 tablet by mouth daily 90 tablet 3    amLODIPine (NORVASC) 5 MG tablet Take 1 tablet by mouth 2 times daily 180 tablet 3    Multiple Vitamin (MULTI VITAMIN DAILY PO) Take by mouth daily      Psyllium (METAMUCIL FIBER SINGLES PO) Take  by mouth daily.  aspirin 81 MG EC tablet Take 81 mg by mouth daily. **Electronically Signed Out**         sls/3/21/2018      Clinical Information  Pre-op Diagnosis:  LEFT LOWER LEG OPEN WOUND, EXCISION LEIOMYOSARCOMA   Operative Findings:  LEFT LEG WOUND TISSUE  Operation Performed:  DEBRIDEMENT APPLICATION OF NUSHIELD    Source of Specimen  1: LEFT LEG WOUND TISSUE    Gross Desc ription  \"RU NIMS, LEFT LEG WOUND TISSUE\" Fragments of granular tan tissue,  5.5 x 3.5 x 0.5 cm in aggregate. Entirely in 3cs. as      Microscopic Description  Microscopic examination performed. ASSESSMENT:     1. Leiomyosarcoma of leg, left (HCC)  CT chest abdomen pelvis with contrast     Patient has had complete excision of his tumor. I doubt if he has any metastatic disease but just for aging purposes I have ordered a CAT scan of the chest abdomen and pelvis and if those are good he will continue follow-up with the surgeon. No further therapy at this time is indicated. We will be glad to see him back as needed     PLAN:     Return for leiomyosarcoma. Orders Placed This Encounter   Procedures    CT chest abdomen pelvis with contrast     Standing Status:   Future     Standing Expiration Date:   4/10/2019     No orders of the defined types were placed in this encounter. Electronically signed by Cari Lamar MD on 3/21/2018 at 4:44 PM      If you have questions, please do not hesitate to call me. I look forward to following Daniel Naiko along with you.     Sincerely,        Cari Lamar MD  Phone: [phone]

## 2018-03-26 ENCOUNTER — ANESTHESIA EVENT (OUTPATIENT)
Dept: OPERATING ROOM | Age: 78
End: 2018-03-26
Payer: MEDICARE

## 2018-03-26 ENCOUNTER — ANESTHESIA (OUTPATIENT)
Dept: OPERATING ROOM | Age: 78
End: 2018-03-26
Payer: MEDICARE

## 2018-03-26 ENCOUNTER — HOSPITAL ENCOUNTER (OUTPATIENT)
Dept: CT IMAGING | Age: 78
Discharge: HOME | End: 2018-03-28
Attending: SURGERY
Payer: MEDICARE

## 2018-03-26 ENCOUNTER — HOSPITAL ENCOUNTER (OUTPATIENT)
Age: 78
Setting detail: OUTPATIENT SURGERY
Discharge: HOME | End: 2018-03-26
Attending: SURGERY | Admitting: SURGERY
Payer: MEDICARE

## 2018-03-26 ENCOUNTER — HOSPITAL ENCOUNTER (OUTPATIENT)
Age: 78
Discharge: HOME | End: 2018-03-26
Attending: SURGERY
Payer: MEDICARE

## 2018-03-26 VITALS
SYSTOLIC BLOOD PRESSURE: 106 MMHG | TEMPERATURE: 97.7 F | DIASTOLIC BLOOD PRESSURE: 41 MMHG | OXYGEN SATURATION: 100 % | RESPIRATION RATE: 19 BRPM

## 2018-03-26 VITALS
HEART RATE: 63 BPM | SYSTOLIC BLOOD PRESSURE: 159 MMHG | OXYGEN SATURATION: 98 % | BODY MASS INDEX: 29.38 KG/M2 | HEIGHT: 72 IN | DIASTOLIC BLOOD PRESSURE: 61 MMHG | TEMPERATURE: 97.6 F | WEIGHT: 216.9 LBS | RESPIRATION RATE: 18 BRPM

## 2018-03-26 DIAGNOSIS — Z01.818 PREPROCEDURAL EXAMINATION: Primary | ICD-10-CM

## 2018-03-26 DIAGNOSIS — C49.22 LEIOMYOSARCOMA OF LEG, LEFT (HCC): ICD-10-CM

## 2018-03-26 DIAGNOSIS — Z01.818 PREPROCEDURAL EXAMINATION: ICD-10-CM

## 2018-03-26 LAB
BUN BLDV-MCNC: 16 MG/DL (ref 8–23)
CREAT SERPL-MCNC: 0.85 MG/DL (ref 0.7–1.2)
GFR AFRICAN AMERICAN: >60 ML/MIN
GFR NON-AFRICAN AMERICAN: >60 ML/MIN
GFR SERPL CREATININE-BSD FRML MDRD: NORMAL ML/MIN/{1.73_M2}
GFR SERPL CREATININE-BSD FRML MDRD: NORMAL ML/MIN/{1.73_M2}

## 2018-03-26 PROCEDURE — 3700000000 HC ANESTHESIA ATTENDED CARE: Performed by: SURGERY

## 2018-03-26 PROCEDURE — 88304 TISSUE EXAM BY PATHOLOGIST: CPT

## 2018-03-26 PROCEDURE — 6360000004 HC RX CONTRAST MEDICATION: Performed by: INTERNAL MEDICINE

## 2018-03-26 PROCEDURE — 2500000003 HC RX 250 WO HCPCS: Performed by: NURSE ANESTHETIST, CERTIFIED REGISTERED

## 2018-03-26 PROCEDURE — 3700000001 HC ADD 15 MINUTES (ANESTHESIA): Performed by: SURGERY

## 2018-03-26 PROCEDURE — 74177 CT ABD & PELVIS W/CONTRAST: CPT

## 2018-03-26 PROCEDURE — 6360000002 HC RX W HCPCS: Performed by: SURGERY

## 2018-03-26 PROCEDURE — 3600000003 HC SURGERY LEVEL 3 BASE: Performed by: SURGERY

## 2018-03-26 PROCEDURE — 82565 ASSAY OF CREATININE: CPT

## 2018-03-26 PROCEDURE — 7100000011 HC PHASE II RECOVERY - ADDTL 15 MIN: Performed by: SURGERY

## 2018-03-26 PROCEDURE — 84520 ASSAY OF UREA NITROGEN: CPT

## 2018-03-26 PROCEDURE — 7100000010 HC PHASE II RECOVERY - FIRST 15 MIN: Performed by: SURGERY

## 2018-03-26 PROCEDURE — 3600000013 HC SURGERY LEVEL 3 ADDTL 15MIN: Performed by: SURGERY

## 2018-03-26 PROCEDURE — 6360000002 HC RX W HCPCS: Performed by: NURSE ANESTHETIST, CERTIFIED REGISTERED

## 2018-03-26 PROCEDURE — 2500000003 HC RX 250 WO HCPCS: Performed by: SURGERY

## 2018-03-26 PROCEDURE — 71260 CT THORAX DX C+: CPT

## 2018-03-26 PROCEDURE — 2720000010 HC SURG SUPPLY STERILE: Performed by: SURGERY

## 2018-03-26 PROCEDURE — 2580000003 HC RX 258: Performed by: SURGERY

## 2018-03-26 DEVICE — NUSHIELD 4X6CM 24SQ CM: Type: IMPLANTABLE DEVICE | Status: FUNCTIONAL

## 2018-03-26 RX ORDER — MORPHINE SULFATE 1 MG/ML
1 INJECTION, SOLUTION EPIDURAL; INTRATHECAL; INTRAVENOUS
Status: DISCONTINUED | OUTPATIENT
Start: 2018-03-26 | End: 2018-03-26 | Stop reason: HOSPADM

## 2018-03-26 RX ORDER — LIDOCAINE HYDROCHLORIDE AND EPINEPHRINE 10; 10 MG/ML; UG/ML
INJECTION, SOLUTION INFILTRATION; PERINEURAL PRN
Status: DISCONTINUED | OUTPATIENT
Start: 2018-03-26 | End: 2018-03-26 | Stop reason: HOSPADM

## 2018-03-26 RX ORDER — SODIUM CHLORIDE, SODIUM LACTATE, POTASSIUM CHLORIDE, CALCIUM CHLORIDE 600; 310; 30; 20 MG/100ML; MG/100ML; MG/100ML; MG/100ML
INJECTION, SOLUTION INTRAVENOUS CONTINUOUS
Status: DISCONTINUED | OUTPATIENT
Start: 2018-03-26 | End: 2018-03-26 | Stop reason: HOSPADM

## 2018-03-26 RX ORDER — MIDAZOLAM HYDROCHLORIDE 1 MG/ML
INJECTION INTRAMUSCULAR; INTRAVENOUS PRN
Status: DISCONTINUED | OUTPATIENT
Start: 2018-03-26 | End: 2018-03-26 | Stop reason: SDUPTHER

## 2018-03-26 RX ORDER — SODIUM CHLORIDE 0.9 % (FLUSH) 0.9 %
SYRINGE (ML) INJECTION
Status: DISCONTINUED
Start: 2018-03-26 | End: 2018-03-26 | Stop reason: HOSPADM

## 2018-03-26 RX ORDER — SODIUM CHLORIDE 0.9 % (FLUSH) 0.9 %
10 SYRINGE (ML) INJECTION EVERY 12 HOURS SCHEDULED
Status: DISCONTINUED | OUTPATIENT
Start: 2018-03-26 | End: 2018-03-26 | Stop reason: HOSPADM

## 2018-03-26 RX ORDER — FENTANYL CITRATE 50 UG/ML
INJECTION, SOLUTION INTRAMUSCULAR; INTRAVENOUS PRN
Status: DISCONTINUED | OUTPATIENT
Start: 2018-03-26 | End: 2018-03-26 | Stop reason: SDUPTHER

## 2018-03-26 RX ORDER — PROPOFOL 10 MG/ML
INJECTION, EMULSION INTRAVENOUS CONTINUOUS PRN
Status: DISCONTINUED | OUTPATIENT
Start: 2018-03-26 | End: 2018-03-26 | Stop reason: SDUPTHER

## 2018-03-26 RX ORDER — ACETAMINOPHEN 325 MG/1
650 TABLET ORAL EVERY 4 HOURS PRN
Status: DISCONTINUED | OUTPATIENT
Start: 2018-03-26 | End: 2018-03-26 | Stop reason: HOSPADM

## 2018-03-26 RX ORDER — ONDANSETRON 2 MG/ML
4 INJECTION INTRAMUSCULAR; INTRAVENOUS EVERY 6 HOURS PRN
Status: DISCONTINUED | OUTPATIENT
Start: 2018-03-26 | End: 2018-03-26 | Stop reason: HOSPADM

## 2018-03-26 RX ORDER — LIDOCAINE HYDROCHLORIDE 10 MG/ML
INJECTION, SOLUTION EPIDURAL; INFILTRATION; INTRACAUDAL; PERINEURAL PRN
Status: DISCONTINUED | OUTPATIENT
Start: 2018-03-26 | End: 2018-03-26 | Stop reason: SDUPTHER

## 2018-03-26 RX ORDER — SODIUM CHLORIDE 0.9 % (FLUSH) 0.9 %
10 SYRINGE (ML) INJECTION PRN
Status: DISCONTINUED | OUTPATIENT
Start: 2018-03-26 | End: 2018-03-26 | Stop reason: HOSPADM

## 2018-03-26 RX ADMIN — LIDOCAINE HYDROCHLORIDE 4 ML: 10 INJECTION, SOLUTION EPIDURAL; INFILTRATION; INTRACAUDAL; PERINEURAL at 08:25

## 2018-03-26 RX ADMIN — CEFAZOLIN SODIUM 2 G: 2 SOLUTION INTRAVENOUS at 08:23

## 2018-03-26 RX ADMIN — IOHEXOL 25 ML: 240 INJECTION, SOLUTION INTRATHECAL; INTRAVASCULAR; INTRAVENOUS; ORAL at 12:52

## 2018-03-26 RX ADMIN — SODIUM CHLORIDE, POTASSIUM CHLORIDE, SODIUM LACTATE AND CALCIUM CHLORIDE: 600; 310; 30; 20 INJECTION, SOLUTION INTRAVENOUS at 07:31

## 2018-03-26 RX ADMIN — PROPOFOL 75 MCG/KG/MIN: 10 INJECTION, EMULSION INTRAVENOUS at 08:30

## 2018-03-26 RX ADMIN — IOPAMIDOL 100 ML: 755 INJECTION, SOLUTION INTRAVENOUS at 12:53

## 2018-03-26 RX ADMIN — MIDAZOLAM 2 MG: 1 INJECTION INTRAMUSCULAR; INTRAVENOUS at 08:20

## 2018-03-26 RX ADMIN — FENTANYL CITRATE 50 MCG: 50 INJECTION, SOLUTION INTRAMUSCULAR; INTRAVENOUS at 08:25

## 2018-03-26 ASSESSMENT — PAIN SCALES - GENERAL: PAINLEVEL_OUTOF10: 4

## 2018-03-26 ASSESSMENT — PAIN DESCRIPTION - PAIN TYPE: TYPE: SURGICAL PAIN

## 2018-03-26 ASSESSMENT — PAIN DESCRIPTION - LOCATION: LOCATION: LEG

## 2018-03-26 ASSESSMENT — PAIN DESCRIPTION - ORIENTATION: ORIENTATION: LEFT;LOWER

## 2018-03-26 ASSESSMENT — PAIN - FUNCTIONAL ASSESSMENT: PAIN_FUNCTIONAL_ASSESSMENT: 0-10

## 2018-03-26 ASSESSMENT — PAIN DESCRIPTION - DESCRIPTORS: DESCRIPTORS: BURNING

## 2018-03-26 NOTE — PROGRESS NOTES
family history. Allergies:  See list    Current Outpatient Prescriptions   Medication Sig Dispense Refill    Naproxen Sodium (ALEVE) 220 MG CAPS Take by mouth every 6 hours as needed       lisinopril (ZESTRIL) 30 MG tablet Take 1 tablet by mouth daily 90 tablet 3    lovastatin (MEVACOR) 40 MG tablet Take 1 tablet by mouth nightly 90 tablet 3    propranolol (INDERAL LA) 80 MG extended release capsule Take 1 capsule by mouth daily 90 capsule 3    sertraline (ZOLOFT) 50 MG tablet Take 1 tablet by mouth daily 90 tablet 3    amLODIPine (NORVASC) 5 MG tablet Take 1 tablet by mouth 2 times daily 180 tablet 3    tiotropium (SPIRIVA HANDIHALER) 18 MCG inhalation capsule Inhale 1 capsule into the lungs daily 90 capsule 3    Multiple Vitamin (MULTI VITAMIN DAILY PO) Take by mouth daily      albuterol (PROVENTIL) (2.5 MG/3ML) 0.083% nebulizer solution Take 3 mLs by nebulization every 6 hours as needed for Wheezing 120 each 11    albuterol sulfate HFA (PROAIR HFA) 108 (90 BASE) MCG/ACT inhaler Inhale 2 puffs into the lungs every 6 hours as needed for Wheezing 1 Inhaler 3    Psyllium (METAMUCIL FIBER SINGLES PO) Take  by mouth daily.  Docusate Calcium (STOOL SOFTENER PO) Take  by mouth daily.  aspirin 81 MG EC tablet Take 81 mg by mouth daily.  Probiotic Product (PROBIOTIC PO) Take 1 tablet by mouth daily       No current facility-administered medications for this visit.       Facility-Administered Medications Ordered in Other Visits   Medication Dose Route Frequency Provider Last Rate Last Dose    lidocaine-EPINEPHrine 2 percent-1:707200 injection    PRN Leo Oliveira MD   10 mL at 02/01/18 1358       Social History     Social History    Marital status:      Spouse name: N/A    Number of children: N/A    Years of education: N/A     Social History Main Topics    Smoking status: Former Smoker     Quit date: 8/15/2003    Smokeless tobacco: Never Used    Alcohol use 0.6 oz/week     1

## 2018-03-26 NOTE — ANESTHESIA POSTPROCEDURE EVALUATION
Department of Anesthesiology  Postprocedure Note    Patient: Jesus Bunn  MRN: [id number]  YOB: 1940  Date of evaluation: 3/26/2018  Time:  9:49 AM     Procedure Summary     Date:  03/26/18 Room / Location:  [address] [place] [address] 02 / [address] OR    Anesthesia Start:  0827 Anesthesia Stop:  5125    Procedure:  LEG ULCER DEBRIDEMENT APPLICATION OF NUSHIELD LEFT LOWER LEG (Left ) Diagnosis:  (LEFT LOWER LEG OPEN WOUND S/P EXCISION LEIOMYOSARCOMA)    Surgeon:  Mark Mas MD Responsible Provider:  Hair Short CRNA    Anesthesia Type:  MAC ASA Status:  3          Anesthesia Type: MAC    Mariposa Phase I: Mariposa Score: 10    Mariposa Phase II: Mariposa Score: 9    Last vitals: Reviewed and per EMR flowsheets.        Anesthesia Post Evaluation    Patient location during evaluation: bedside  Patient participation: complete - patient participated  Level of consciousness: awake and alert  Pain score: 0  Airway patency: patent  Nausea & Vomiting: no nausea and no vomiting  Complications: no  Cardiovascular status: hemodynamically stable  Respiratory status: acceptable and spontaneous ventilation  Hydration status: euvolemic

## 2018-03-26 NOTE — OP NOTE
[facility]                              [address] [place] [address]                                 OPERATIVE REPORT    PATIENT NAME: Omari Rodriguez                      :        1940  MED REC NO:   [id number]                              ROOM:  ACCOUNT NO:   [de-identified]                           ADMIT DATE: 2018  PROVIDER:     Iker Lynn    DATE OF PROCEDURE:  2018    PREOPERATIVE DIAGNOSES:  1. Left lower leg open wound. 2.  Status post excision leiomyosarcoma. POSTOPERATIVE DIAGNOSES:  1. Left lower leg open wound. 2.  Status post excision leiomyosarcoma. OPERATIONS PERFORMED:  1. Debridement left lower leg open wound (10 cm x 4.3 cm). 2.  Application of NuShield x2 (4 x 6 cm). ATTENDING SURGEON:  Iker Lynn    PCP:  Dr. Granda File    ANESTHESIA:  MAC.    INDICATIONS:  The patient is a pleasant 59-year-old white male status post  excision leiomyosarcoma left lower leg. He presents today for wound  debridement and placental NuShield application. OPERATIVE PROCEDURE:  After obtaining informed consent with discussion of  risks, benefits, and alternatives, the patient was taken to the operating  theater and placed in supine position on the operating table. He received  preoperative IV antibiotics and JOSE MANUEL sequentials. Following adequate IV  sedation, he was prepped and draped in standard fashion. The left lower  leg was debrided off hypertrophic granulation tissue. Wound measurements  were approximately 10 x 4.4 cm with increasing ingrowth of epithelial  tissue islands. Hemostasis was established with selective use of cautery  and topical application of lidocaine with epinephrine. Two 4 x 6 cm  NuShield placental allografts were selected. They were appropriately  oriented and placed directly on the wound and rehydrated with saline.   They  were held in place with Steri-Strips, Adaptic and a

## 2018-03-26 NOTE — BRIEF OP NOTE
Brief Postoperative Note  ______________________________________________________________    Patient: Aleida Cleveland  YOB: 1940  MRN: [id number]  Date of Procedure: 3/26/2018    Pre-Op Diagnosis:     1.  L lower leg open wound     2. S/P excision leimyosarcoma    Post-Op Diagnosis:      1.  L lower leg open wound     2. S/P excision leimyosarcoma       Procedure(s):      1. Debridement of L lower leg wound     2. Application of NuShield    Anesthesia: Monitor Anesthesia Care    Surgeon(s):  Deann Apley, MD    Staff:  Scrub Person First: Karyle Shiley     Estimated Blood Loss:  Less than 27DS    Complications: None    Specimens:   ID Type Source Tests Collected by Time Destination   A :  Tissue Leg SURGICAL PATHOLOGY Deann Apley, MD 3/26/2018 0845        Implants:    1. NuShield 4 x 6 cm, donor #, ID #[id number].    2.  NuShield 4 x 6 cm, donor #, ID #[id number].     Drains:  None      Findings:   As above    Dictated # [id number]    Deann Apley, MD  Date: 3/26/2018  Time: 9:07 AM

## 2018-03-26 NOTE — ANESTHESIA PRE PROCEDURE
Department of Anesthesiology  Preprocedure Note       Name:  Crystal Reyes   Age:  68 y.o.  :  1940                                          MRN:  [id number]         Date:  3/26/2018      Surgeon: Darío Horan):  Carol Herndon MD    Procedure: Procedure(s):  LEG ULCER DEBRIDEMENT APPLICATION OF [facility] LEFT LOWER LEG    Medications prior to admission:   Prior to Admission medications    Medication Sig Start Date End Date Taking? Authorizing Provider   Probiotic Product (PROBIOTIC PO) Take 1 tablet by mouth daily    Historical Provider, MD   Naproxen Sodium (ALEVE) 220 MG CAPS Take by mouth every 6 hours as needed     Historical Provider, MD   lisinopril (ZESTRIL) 30 MG tablet Take 1 tablet by mouth daily 10/9/17   [facility], [facility]   lovastatin (MEVACOR) 40 MG tablet Take 1 tablet by mouth nightly 10/9/17   [place] Filler, DO   propranolol (INDERAL LA) 80 MG extended release capsule Take 1 capsule by mouth daily 10/9/17   Hershell Filler, DO   sertraline (ZOLOFT) 50 MG tablet Take 1 tablet by mouth daily 10/9/17   Hershell Filler, DO   amLODIPine (NORVASC) 5 MG tablet Take 1 tablet by mouth 2 times daily 10/9/17   Hershell Filler, DO   tiotropium (SPIRIVA HANDIHALER) 18 MCG inhalation capsule Inhale 1 capsule into the lungs daily 10/9/17   Hershell Filler, DO   Multiple Vitamin (MULTI VITAMIN DAILY PO) Take by mouth daily    Historical Provider, MD   albuterol (PROVENTIL) (2.5 MG/3ML) 0.083% nebulizer solution Take 3 mLs by nebulization every 6 hours as needed for Wheezing 17   Cindy Cotto MD   albuterol sulfate HFA (PROAIR HFA) 108 (90 BASE) MCG/ACT inhaler Inhale 2 puffs into the lungs every 6 hours as needed for Wheezing 3/30/16   [place]hell Filler, DO   Psyllium (METAMUCIL FIBER SINGLES PO) Take  by mouth daily. Historical Provider, MD   Docusate Calcium (STOOL SOFTENER PO) Take  by mouth daily. Historical Provider, MD   aspirin 81 MG EC tablet Take 81 mg by mouth daily.       Historical Provider, MD       Current medications:    No current facility-administered medications for this encounter. Facility-Administered Medications Ordered in Other Encounters   Medication Dose Route Frequency Provider Last Rate Last Dose    lidocaine-EPINEPHrine 2 percent-1:[id number] injection    PRN Vesna Siu MD   10 mL at 02/01/18 1358       Allergies:  No Known Allergies    Problem List:    Patient Active Problem List   Diagnosis Code    Classical migraine G43. 109    Malaise and fatigue R53.81, R53.83    Pure hypercholesterolemia E78.00    Essential hypertension, benign I10    Dysthymic disorder F34.1    Impaired fasting glucose R73.01    COPD (chronic obstructive pulmonary disease) (HCC) J44.9    Leg mass, left R22.42    Leiomyosarcoma of leg, left ([facility]) C49.22    Hypertension I10    Open wound of left lower leg S81.802A       Past Medical History:        Diagnosis Date    Cancer ([facility] [address]     left lower leg    COPD (chronic obstructive pulmonary disease) (HCC)     Hyperlipidemia     Hypertension     Migraine        Past Surgical History:        Procedure Laterality Date    EXCISION / BIOPSY SKIN LESION OF LEG Left 11/13/2017    LEG LESION BIOPSY EXCISION-EXCISION LEFT LEG LESION performed by Vesna Siu MD at [place] [address] / BIOPSY SKIN LESION OF LEG  11/22/2017    LEG LESION BIOPSY EXCISION performed by Vesna Siu MD at [address] [facility] OFFICE/OUTPT VISIT,PROCEDURE ONLY Left 1/24/2018    LEG DEBRIDEMENT SKIN GRAFT--LEFT LEG DEBRIDEMENT WITH APLIGRAFT APPLICATION performed by Vesna iSu MD at [address] [facility] OFFICE/OUTPT VISIT,PROCEDURE ONLY Left 2/1/2018    LEG ULCER DEBRIDEMENT APPLICATION OF APLIGRAF performed by Vesna Siu MD at [address] [employer] [employer] [facility] OFFICE/OUTPT VISIT,PROCEDURE ONLY Left 2/12/2018    LEG ULCER DEBRIDEMENT APPLICATION OF APLIGRAF----Application Apligraf ,Debridement Left Lower Leg Wound performed by Abraham Hart

## 2018-03-27 LAB — SURGICAL PATHOLOGY REPORT: NORMAL

## 2018-04-02 ENCOUNTER — HOSPITAL ENCOUNTER (OUTPATIENT)
Age: 78
Setting detail: OUTPATIENT SURGERY
Discharge: HOME | End: 2018-04-02
Attending: SURGERY | Admitting: SURGERY
Payer: MEDICARE

## 2018-04-02 ENCOUNTER — ANESTHESIA (OUTPATIENT)
Dept: OPERATING ROOM | Age: 78
End: 2018-04-02
Payer: MEDICARE

## 2018-04-02 ENCOUNTER — ANESTHESIA EVENT (OUTPATIENT)
Dept: OPERATING ROOM | Age: 78
End: 2018-04-02
Payer: MEDICARE

## 2018-04-02 VITALS
BODY MASS INDEX: 30.1 KG/M2 | HEART RATE: 64 BPM | TEMPERATURE: 97.2 F | WEIGHT: 215 LBS | HEIGHT: 71 IN | OXYGEN SATURATION: 99 % | SYSTOLIC BLOOD PRESSURE: 147 MMHG | RESPIRATION RATE: 18 BRPM | DIASTOLIC BLOOD PRESSURE: 59 MMHG

## 2018-04-02 VITALS
DIASTOLIC BLOOD PRESSURE: 52 MMHG | SYSTOLIC BLOOD PRESSURE: 102 MMHG | RESPIRATION RATE: 15 BRPM | OXYGEN SATURATION: 100 %

## 2018-04-02 PROCEDURE — 88304 TISSUE EXAM BY PATHOLOGIST: CPT

## 2018-04-02 PROCEDURE — 3700000000 HC ANESTHESIA ATTENDED CARE: Performed by: SURGERY

## 2018-04-02 PROCEDURE — 3600000003 HC SURGERY LEVEL 3 BASE: Performed by: SURGERY

## 2018-04-02 PROCEDURE — 2500000003 HC RX 250 WO HCPCS: Performed by: SURGERY

## 2018-04-02 PROCEDURE — 6360000002 HC RX W HCPCS: Performed by: SURGERY

## 2018-04-02 PROCEDURE — 2720000010 HC SURG SUPPLY STERILE: Performed by: SURGERY

## 2018-04-02 PROCEDURE — 3600000013 HC SURGERY LEVEL 3 ADDTL 15MIN: Performed by: SURGERY

## 2018-04-02 PROCEDURE — 2580000003 HC RX 258: Performed by: SURGERY

## 2018-04-02 PROCEDURE — 3700000001 HC ADD 15 MINUTES (ANESTHESIA): Performed by: SURGERY

## 2018-04-02 PROCEDURE — 7100000010 HC PHASE II RECOVERY - FIRST 15 MIN: Performed by: SURGERY

## 2018-04-02 PROCEDURE — 6370000000 HC RX 637 (ALT 250 FOR IP): Performed by: SURGERY

## 2018-04-02 PROCEDURE — 2500000003 HC RX 250 WO HCPCS: Performed by: NURSE ANESTHETIST, CERTIFIED REGISTERED

## 2018-04-02 PROCEDURE — 7100000011 HC PHASE II RECOVERY - ADDTL 15 MIN: Performed by: SURGERY

## 2018-04-02 PROCEDURE — 6360000002 HC RX W HCPCS: Performed by: NURSE ANESTHETIST, CERTIFIED REGISTERED

## 2018-04-02 DEVICE — NUSHIELD 4X6CM 24SQ CM: Type: IMPLANTABLE DEVICE | Site: LEG | Status: FUNCTIONAL

## 2018-04-02 RX ORDER — LIDOCAINE HYDROCHLORIDE 10 MG/ML
INJECTION, SOLUTION EPIDURAL; INFILTRATION; INTRACAUDAL; PERINEURAL PRN
Status: DISCONTINUED | OUTPATIENT
Start: 2018-04-02 | End: 2018-04-02 | Stop reason: SDUPTHER

## 2018-04-02 RX ORDER — SODIUM CHLORIDE, SODIUM LACTATE, POTASSIUM CHLORIDE, CALCIUM CHLORIDE 600; 310; 30; 20 MG/100ML; MG/100ML; MG/100ML; MG/100ML
INJECTION, SOLUTION INTRAVENOUS CONTINUOUS
Status: DISCONTINUED | OUTPATIENT
Start: 2018-04-02 | End: 2018-04-02 | Stop reason: HOSPADM

## 2018-04-02 RX ORDER — ACETAMINOPHEN 325 MG/1
650 TABLET ORAL EVERY 4 HOURS PRN
Status: DISCONTINUED | OUTPATIENT
Start: 2018-04-02 | End: 2018-04-02 | Stop reason: HOSPADM

## 2018-04-02 RX ORDER — SODIUM CHLORIDE 0.9 % (FLUSH) 0.9 %
10 SYRINGE (ML) INJECTION EVERY 12 HOURS SCHEDULED
Status: DISCONTINUED | OUTPATIENT
Start: 2018-04-02 | End: 2018-04-02 | Stop reason: HOSPADM

## 2018-04-02 RX ORDER — FENTANYL CITRATE 50 UG/ML
INJECTION, SOLUTION INTRAMUSCULAR; INTRAVENOUS PRN
Status: DISCONTINUED | OUTPATIENT
Start: 2018-04-02 | End: 2018-04-02 | Stop reason: SDUPTHER

## 2018-04-02 RX ORDER — MORPHINE SULFATE 1 MG/ML
1 INJECTION, SOLUTION EPIDURAL; INTRATHECAL; INTRAVENOUS
Status: DISCONTINUED | OUTPATIENT
Start: 2018-04-02 | End: 2018-04-02 | Stop reason: HOSPADM

## 2018-04-02 RX ORDER — ONDANSETRON 2 MG/ML
4 INJECTION INTRAMUSCULAR; INTRAVENOUS EVERY 6 HOURS PRN
Status: DISCONTINUED | OUTPATIENT
Start: 2018-04-02 | End: 2018-04-02 | Stop reason: HOSPADM

## 2018-04-02 RX ORDER — LIDOCAINE HYDROCHLORIDE AND EPINEPHRINE 10; 10 MG/ML; UG/ML
INJECTION, SOLUTION INFILTRATION; PERINEURAL PRN
Status: DISCONTINUED | OUTPATIENT
Start: 2018-04-02 | End: 2018-04-02 | Stop reason: HOSPADM

## 2018-04-02 RX ORDER — PROPOFOL 10 MG/ML
INJECTION, EMULSION INTRAVENOUS CONTINUOUS PRN
Status: DISCONTINUED | OUTPATIENT
Start: 2018-04-02 | End: 2018-04-02 | Stop reason: SDUPTHER

## 2018-04-02 RX ORDER — MIDAZOLAM HYDROCHLORIDE 1 MG/ML
INJECTION INTRAMUSCULAR; INTRAVENOUS PRN
Status: DISCONTINUED | OUTPATIENT
Start: 2018-04-02 | End: 2018-04-02 | Stop reason: SDUPTHER

## 2018-04-02 RX ORDER — SODIUM CHLORIDE 0.9 % (FLUSH) 0.9 %
10 SYRINGE (ML) INJECTION PRN
Status: DISCONTINUED | OUTPATIENT
Start: 2018-04-02 | End: 2018-04-02 | Stop reason: HOSPADM

## 2018-04-02 RX ADMIN — SODIUM CHLORIDE, POTASSIUM CHLORIDE, SODIUM LACTATE AND CALCIUM CHLORIDE: 600; 310; 30; 20 INJECTION, SOLUTION INTRAVENOUS at 10:20

## 2018-04-02 RX ADMIN — ACETAMINOPHEN 650 MG: 325 TABLET, FILM COATED ORAL at 13:17

## 2018-04-02 RX ADMIN — MIDAZOLAM HYDROCHLORIDE 1 MG: 2 INJECTION, SOLUTION INTRAMUSCULAR; INTRAVENOUS at 12:00

## 2018-04-02 RX ADMIN — PROPOFOL 100 MCG/KG/MIN: 10 INJECTION, EMULSION INTRAVENOUS at 12:04

## 2018-04-02 RX ADMIN — FENTANYL CITRATE 50 MCG: 50 INJECTION, SOLUTION INTRAMUSCULAR; INTRAVENOUS at 12:00

## 2018-04-02 RX ADMIN — LIDOCAINE HYDROCHLORIDE 50 MG: 10 INJECTION, SOLUTION EPIDURAL; INFILTRATION; INTRACAUDAL; PERINEURAL at 12:04

## 2018-04-02 ASSESSMENT — PAIN SCALES - GENERAL: PAINLEVEL_OUTOF10: 4

## 2018-04-04 LAB — SURGICAL PATHOLOGY REPORT: NORMAL

## 2018-04-09 ENCOUNTER — OFFICE VISIT (OUTPATIENT)
Dept: FAMILY MEDICINE CLINIC | Age: 78
End: 2018-04-09
Payer: MEDICARE

## 2018-04-09 VITALS
DIASTOLIC BLOOD PRESSURE: 70 MMHG | BODY MASS INDEX: 29.85 KG/M2 | HEART RATE: 70 BPM | SYSTOLIC BLOOD PRESSURE: 116 MMHG | WEIGHT: 214 LBS | OXYGEN SATURATION: 96 %

## 2018-04-09 DIAGNOSIS — E78.00 PURE HYPERCHOLESTEROLEMIA: ICD-10-CM

## 2018-04-09 DIAGNOSIS — F34.1 DYSTHYMIC DISORDER: ICD-10-CM

## 2018-04-09 DIAGNOSIS — I10 ESSENTIAL HYPERTENSION, BENIGN: Primary | ICD-10-CM

## 2018-04-09 DIAGNOSIS — J43.1 PANLOBULAR EMPHYSEMA (HCC): ICD-10-CM

## 2018-04-09 PROCEDURE — G8926 SPIRO NO PERF OR DOC: HCPCS | Performed by: FAMILY MEDICINE

## 2018-04-09 PROCEDURE — G8417 CALC BMI ABV UP PARAM F/U: HCPCS | Performed by: FAMILY MEDICINE

## 2018-04-09 PROCEDURE — 4040F PNEUMOC VAC/ADMIN/RCVD: CPT | Performed by: FAMILY MEDICINE

## 2018-04-09 PROCEDURE — 3023F SPIROM DOC REV: CPT | Performed by: FAMILY MEDICINE

## 2018-04-09 PROCEDURE — 1123F ACP DISCUSS/DSCN MKR DOCD: CPT | Performed by: FAMILY MEDICINE

## 2018-04-09 PROCEDURE — 1036F TOBACCO NON-USER: CPT | Performed by: FAMILY MEDICINE

## 2018-04-09 PROCEDURE — 99214 OFFICE O/P EST MOD 30 MIN: CPT | Performed by: FAMILY MEDICINE

## 2018-04-09 PROCEDURE — G8427 DOCREV CUR MEDS BY ELIG CLIN: HCPCS | Performed by: FAMILY MEDICINE

## 2018-04-09 RX ORDER — LISINOPRIL 30 MG/1
30 TABLET ORAL DAILY
Qty: 90 TABLET | Refills: 3 | Status: SHIPPED | OUTPATIENT
Start: 2018-04-09 | End: 2018-10-09 | Stop reason: SDUPTHER

## 2018-04-09 RX ORDER — AMLODIPINE BESYLATE 5 MG/1
5 TABLET ORAL 2 TIMES DAILY
Qty: 180 TABLET | Refills: 3 | Status: SHIPPED | OUTPATIENT
Start: 2018-04-09 | End: 2018-10-09 | Stop reason: SDUPTHER

## 2018-04-09 RX ORDER — LOVASTATIN 40 MG/1
40 TABLET ORAL NIGHTLY
Qty: 90 TABLET | Refills: 3 | Status: SHIPPED | OUTPATIENT
Start: 2018-04-09 | End: 2018-10-09 | Stop reason: SDUPTHER

## 2018-04-09 RX ORDER — PROPRANOLOL HYDROCHLORIDE 80 MG/1
80 CAPSULE, EXTENDED RELEASE ORAL DAILY
Qty: 90 CAPSULE | Refills: 3 | Status: SHIPPED | OUTPATIENT
Start: 2018-04-09 | End: 2018-10-09 | Stop reason: SDUPTHER

## 2018-04-09 ASSESSMENT — PATIENT HEALTH QUESTIONNAIRE - PHQ9
SUM OF ALL RESPONSES TO PHQ9 QUESTIONS 1 & 2: 2
SUM OF ALL RESPONSES TO PHQ QUESTIONS 1-9: 2
1. LITTLE INTEREST OR PLEASURE IN DOING THINGS: 1
2. FEELING DOWN, DEPRESSED OR HOPELESS: 1

## 2018-04-11 ENCOUNTER — ANESTHESIA (OUTPATIENT)
Dept: OPERATING ROOM | Age: 78
End: 2018-04-11
Payer: MEDICARE

## 2018-04-11 ENCOUNTER — ANESTHESIA EVENT (OUTPATIENT)
Dept: OPERATING ROOM | Age: 78
End: 2018-04-11
Payer: MEDICARE

## 2018-04-11 ENCOUNTER — HOSPITAL ENCOUNTER (OUTPATIENT)
Age: 78
Setting detail: OUTPATIENT SURGERY
Discharge: HOME | End: 2018-04-11
Attending: SURGERY | Admitting: SURGERY
Payer: MEDICARE

## 2018-04-11 VITALS
BODY MASS INDEX: 29.4 KG/M2 | TEMPERATURE: 98.2 F | HEIGHT: 71 IN | HEART RATE: 59 BPM | SYSTOLIC BLOOD PRESSURE: 159 MMHG | DIASTOLIC BLOOD PRESSURE: 73 MMHG | RESPIRATION RATE: 16 BRPM | WEIGHT: 210 LBS | OXYGEN SATURATION: 98 %

## 2018-04-11 VITALS
OXYGEN SATURATION: 97 % | RESPIRATION RATE: 10 BRPM | SYSTOLIC BLOOD PRESSURE: 88 MMHG | DIASTOLIC BLOOD PRESSURE: 42 MMHG

## 2018-04-11 PROCEDURE — 6360000002 HC RX W HCPCS

## 2018-04-11 PROCEDURE — 88304 TISSUE EXAM BY PATHOLOGIST: CPT

## 2018-04-11 PROCEDURE — 6360000002 HC RX W HCPCS: Performed by: REGISTERED NURSE

## 2018-04-11 PROCEDURE — 6370000000 HC RX 637 (ALT 250 FOR IP): Performed by: SURGERY

## 2018-04-11 PROCEDURE — 2500000003 HC RX 250 WO HCPCS: Performed by: REGISTERED NURSE

## 2018-04-11 PROCEDURE — 3700000000 HC ANESTHESIA ATTENDED CARE: Performed by: SURGERY

## 2018-04-11 PROCEDURE — 3600000003 HC SURGERY LEVEL 3 BASE: Performed by: SURGERY

## 2018-04-11 PROCEDURE — 2580000003 HC RX 258: Performed by: SURGERY

## 2018-04-11 PROCEDURE — 7100000010 HC PHASE II RECOVERY - FIRST 15 MIN: Performed by: SURGERY

## 2018-04-11 PROCEDURE — 7100000011 HC PHASE II RECOVERY - ADDTL 15 MIN: Performed by: SURGERY

## 2018-04-11 PROCEDURE — 3600000013 HC SURGERY LEVEL 3 ADDTL 15MIN: Performed by: SURGERY

## 2018-04-11 PROCEDURE — 3700000001 HC ADD 15 MINUTES (ANESTHESIA): Performed by: SURGERY

## 2018-04-11 PROCEDURE — 2720000010 HC SURG SUPPLY STERILE: Performed by: SURGERY

## 2018-04-11 PROCEDURE — 6360000002 HC RX W HCPCS: Performed by: SURGERY

## 2018-04-11 DEVICE — NUSHIELD 4X6CM 24SQ CM: Type: IMPLANTABLE DEVICE | Site: LEG | Status: FUNCTIONAL

## 2018-04-11 RX ORDER — LIDOCAINE HYDROCHLORIDE 10 MG/ML
INJECTION, SOLUTION INFILTRATION; PERINEURAL PRN
Status: DISCONTINUED | OUTPATIENT
Start: 2018-04-11 | End: 2018-04-11 | Stop reason: SDUPTHER

## 2018-04-11 RX ORDER — SODIUM CHLORIDE 0.9 % (FLUSH) 0.9 %
10 SYRINGE (ML) INJECTION EVERY 12 HOURS SCHEDULED
Status: DISCONTINUED | OUTPATIENT
Start: 2018-04-11 | End: 2018-04-11 | Stop reason: HOSPADM

## 2018-04-11 RX ORDER — MIDAZOLAM HYDROCHLORIDE 1 MG/ML
INJECTION INTRAMUSCULAR; INTRAVENOUS PRN
Status: DISCONTINUED | OUTPATIENT
Start: 2018-04-11 | End: 2018-04-11 | Stop reason: SDUPTHER

## 2018-04-11 RX ORDER — FENTANYL CITRATE 50 UG/ML
INJECTION, SOLUTION INTRAMUSCULAR; INTRAVENOUS PRN
Status: DISCONTINUED | OUTPATIENT
Start: 2018-04-11 | End: 2018-04-11 | Stop reason: SDUPTHER

## 2018-04-11 RX ORDER — PROPOFOL 10 MG/ML
INJECTION, EMULSION INTRAVENOUS CONTINUOUS PRN
Status: DISCONTINUED | OUTPATIENT
Start: 2018-04-11 | End: 2018-04-11 | Stop reason: SDUPTHER

## 2018-04-11 RX ORDER — ONDANSETRON 2 MG/ML
INJECTION INTRAMUSCULAR; INTRAVENOUS PRN
Status: DISCONTINUED | OUTPATIENT
Start: 2018-04-11 | End: 2018-04-11 | Stop reason: SDUPTHER

## 2018-04-11 RX ORDER — ACETAMINOPHEN 325 MG/1
650 TABLET ORAL EVERY 4 HOURS PRN
Status: DISCONTINUED | OUTPATIENT
Start: 2018-04-11 | End: 2018-04-11 | Stop reason: HOSPADM

## 2018-04-11 RX ORDER — SODIUM CHLORIDE, SODIUM LACTATE, POTASSIUM CHLORIDE, CALCIUM CHLORIDE 600; 310; 30; 20 MG/100ML; MG/100ML; MG/100ML; MG/100ML
INJECTION, SOLUTION INTRAVENOUS CONTINUOUS
Status: DISCONTINUED | OUTPATIENT
Start: 2018-04-11 | End: 2018-04-11 | Stop reason: HOSPADM

## 2018-04-11 RX ORDER — SODIUM CHLORIDE 0.9 % (FLUSH) 0.9 %
10 SYRINGE (ML) INJECTION PRN
Status: DISCONTINUED | OUTPATIENT
Start: 2018-04-11 | End: 2018-04-11 | Stop reason: HOSPADM

## 2018-04-11 RX ORDER — MORPHINE SULFATE 1 MG/ML
1 INJECTION, SOLUTION EPIDURAL; INTRATHECAL; INTRAVENOUS
Status: DISCONTINUED | OUTPATIENT
Start: 2018-04-11 | End: 2018-04-11 | Stop reason: HOSPADM

## 2018-04-11 RX ORDER — ONDANSETRON 2 MG/ML
4 INJECTION INTRAMUSCULAR; INTRAVENOUS EVERY 6 HOURS PRN
Status: DISCONTINUED | OUTPATIENT
Start: 2018-04-11 | End: 2018-04-11 | Stop reason: HOSPADM

## 2018-04-11 RX ADMIN — FENTANYL CITRATE 50 MCG: 50 INJECTION, SOLUTION INTRAMUSCULAR; INTRAVENOUS at 08:22

## 2018-04-11 RX ADMIN — PROPOFOL 100 MCG/KG/MIN: 10 INJECTION, EMULSION INTRAVENOUS at 08:29

## 2018-04-11 RX ADMIN — FENTANYL CITRATE 50 MCG: 50 INJECTION, SOLUTION INTRAMUSCULAR; INTRAVENOUS at 08:24

## 2018-04-11 RX ADMIN — MIDAZOLAM HYDROCHLORIDE 2 MG: 2 INJECTION, SOLUTION INTRAMUSCULAR; INTRAVENOUS at 08:22

## 2018-04-11 RX ADMIN — SODIUM CHLORIDE, POTASSIUM CHLORIDE, SODIUM LACTATE AND CALCIUM CHLORIDE: 600; 310; 30; 20 INJECTION, SOLUTION INTRAVENOUS at 07:16

## 2018-04-11 RX ADMIN — ONDANSETRON 4 MG: 2 INJECTION, SOLUTION INTRAMUSCULAR; INTRAVENOUS at 08:20

## 2018-04-11 RX ADMIN — LIDOCAINE HYDROCHLORIDE 5 ML: 10 INJECTION, SOLUTION INFILTRATION; PERINEURAL at 08:27

## 2018-04-11 RX ADMIN — ACETAMINOPHEN 650 MG: 325 TABLET, FILM COATED ORAL at 09:48

## 2018-04-11 ASSESSMENT — PAIN SCALES - GENERAL
PAINLEVEL_OUTOF10: 2
PAINLEVEL_OUTOF10: 4
PAINLEVEL_OUTOF10: 3
PAINLEVEL_OUTOF10: 4

## 2018-04-11 ASSESSMENT — PAIN - FUNCTIONAL ASSESSMENT: PAIN_FUNCTIONAL_ASSESSMENT: 0-10

## 2018-04-12 LAB — SURGICAL PATHOLOGY REPORT: NORMAL

## 2018-04-18 ENCOUNTER — HOSPITAL ENCOUNTER (OUTPATIENT)
Age: 78
Setting detail: OUTPATIENT SURGERY
Discharge: HOME | End: 2018-04-18
Attending: SURGERY | Admitting: SURGERY
Payer: MEDICARE

## 2018-04-18 VITALS
SYSTOLIC BLOOD PRESSURE: 189 MMHG | DIASTOLIC BLOOD PRESSURE: 68 MMHG | HEART RATE: 71 BPM | WEIGHT: 214 LBS | HEIGHT: 71 IN | BODY MASS INDEX: 29.96 KG/M2 | OXYGEN SATURATION: 99 % | RESPIRATION RATE: 18 BRPM | TEMPERATURE: 98.9 F

## 2018-04-18 PROCEDURE — 6360000002 HC RX W HCPCS: Performed by: SURGERY

## 2018-04-18 PROCEDURE — 7100000010 HC PHASE II RECOVERY - FIRST 15 MIN: Performed by: SURGERY

## 2018-04-18 PROCEDURE — 3600000003 HC SURGERY LEVEL 3 BASE: Performed by: SURGERY

## 2018-04-18 PROCEDURE — 3600000013 HC SURGERY LEVEL 3 ADDTL 15MIN: Performed by: SURGERY

## 2018-04-18 PROCEDURE — 15271 SKIN SUB GRAFT TRNK/ARM/LEG: CPT | Performed by: SURGERY

## 2018-04-18 PROCEDURE — 7100000011 HC PHASE II RECOVERY - ADDTL 15 MIN: Performed by: SURGERY

## 2018-04-18 PROCEDURE — 97597 DBRDMT OPN WND 1ST 20 CM/<: CPT | Performed by: SURGERY

## 2018-04-18 PROCEDURE — 6370000000 HC RX 637 (ALT 250 FOR IP): Performed by: SURGERY

## 2018-04-18 PROCEDURE — 2580000003 HC RX 258: Performed by: SURGERY

## 2018-04-18 PROCEDURE — 2500000003 HC RX 250 WO HCPCS: Performed by: SURGERY

## 2018-04-18 PROCEDURE — 88304 TISSUE EXAM BY PATHOLOGIST: CPT

## 2018-04-18 DEVICE — AFFINITY 2.5CMX2.5CM 7SQ CM: Type: IMPLANTABLE DEVICE | Site: LEG | Status: FUNCTIONAL

## 2018-04-18 RX ORDER — SODIUM CHLORIDE, SODIUM LACTATE, POTASSIUM CHLORIDE, CALCIUM CHLORIDE 600; 310; 30; 20 MG/100ML; MG/100ML; MG/100ML; MG/100ML
INJECTION, SOLUTION INTRAVENOUS CONTINUOUS
Status: CANCELLED | OUTPATIENT
Start: 2018-04-18

## 2018-04-18 RX ORDER — MORPHINE SULFATE 1 MG/ML
1 INJECTION, SOLUTION EPIDURAL; INTRATHECAL; INTRAVENOUS
Status: CANCELLED | OUTPATIENT
Start: 2018-04-18

## 2018-04-18 RX ORDER — LIDOCAINE HYDROCHLORIDE AND EPINEPHRINE 10; 10 MG/ML; UG/ML
INJECTION, SOLUTION INFILTRATION; PERINEURAL PRN
Status: DISCONTINUED | OUTPATIENT
Start: 2018-04-18 | End: 2018-04-18 | Stop reason: HOSPADM

## 2018-04-18 RX ORDER — SODIUM CHLORIDE 0.9 % (FLUSH) 0.9 %
10 SYRINGE (ML) INJECTION EVERY 12 HOURS SCHEDULED
Status: DISCONTINUED | OUTPATIENT
Start: 2018-04-18 | End: 2018-04-18 | Stop reason: HOSPADM

## 2018-04-18 RX ORDER — SODIUM CHLORIDE 0.9 % (FLUSH) 0.9 %
10 SYRINGE (ML) INJECTION EVERY 12 HOURS SCHEDULED
Status: CANCELLED | OUTPATIENT
Start: 2018-04-18

## 2018-04-18 RX ORDER — SODIUM CHLORIDE 0.9 % (FLUSH) 0.9 %
10 SYRINGE (ML) INJECTION PRN
Status: CANCELLED | OUTPATIENT
Start: 2018-04-18

## 2018-04-18 RX ORDER — SODIUM CHLORIDE 0.9 % (FLUSH) 0.9 %
10 SYRINGE (ML) INJECTION PRN
Status: DISCONTINUED | OUTPATIENT
Start: 2018-04-18 | End: 2018-04-18 | Stop reason: HOSPADM

## 2018-04-18 RX ORDER — ONDANSETRON 2 MG/ML
4 INJECTION INTRAMUSCULAR; INTRAVENOUS EVERY 6 HOURS PRN
Status: CANCELLED | OUTPATIENT
Start: 2018-04-18

## 2018-04-18 RX ORDER — SODIUM CHLORIDE, SODIUM LACTATE, POTASSIUM CHLORIDE, CALCIUM CHLORIDE 600; 310; 30; 20 MG/100ML; MG/100ML; MG/100ML; MG/100ML
INJECTION, SOLUTION INTRAVENOUS CONTINUOUS
Status: DISCONTINUED | OUTPATIENT
Start: 2018-04-18 | End: 2018-04-18 | Stop reason: HOSPADM

## 2018-04-18 RX ORDER — ACETAMINOPHEN 325 MG/1
650 TABLET ORAL EVERY 4 HOURS PRN
Status: CANCELLED | OUTPATIENT
Start: 2018-04-18

## 2018-04-18 RX ADMIN — SODIUM CHLORIDE, POTASSIUM CHLORIDE, SODIUM LACTATE AND CALCIUM CHLORIDE: 600; 310; 30; 20 INJECTION, SOLUTION INTRAVENOUS at 07:15

## 2018-04-18 ASSESSMENT — PAIN - FUNCTIONAL ASSESSMENT: PAIN_FUNCTIONAL_ASSESSMENT: 0-10

## 2018-04-18 ASSESSMENT — PAIN SCALES - GENERAL
PAINLEVEL_OUTOF10: 0
PAINLEVEL_OUTOF10: 2

## 2018-04-19 LAB — SURGICAL PATHOLOGY REPORT: NORMAL

## 2018-04-22 ASSESSMENT — ENCOUNTER SYMPTOMS
SORE THROAT: 0
TROUBLE SWALLOWING: 0
RHINORRHEA: 0
BLURRED VISION: 0
HEARTBURN: 0
SHORTNESS OF BREATH: 0
COUGH: 1
ORTHOPNEA: 0

## 2018-04-22 ASSESSMENT — COPD QUESTIONNAIRES: COPD: 1

## 2018-04-25 ENCOUNTER — PROCEDURE VISIT (OUTPATIENT)
Dept: SURGERY | Age: 78
End: 2018-04-25
Payer: MEDICARE

## 2018-04-25 VITALS — HEART RATE: 74 BPM | RESPIRATION RATE: 18 BRPM | HEIGHT: 71 IN | BODY MASS INDEX: 30.24 KG/M2 | WEIGHT: 216 LBS

## 2018-04-25 DIAGNOSIS — S81.802D OPEN WOUND OF LEFT LOWER EXTREMITY, SUBSEQUENT ENCOUNTER: Primary | ICD-10-CM

## 2018-04-25 PROCEDURE — 99215 OFFICE O/P EST HI 40 MIN: CPT | Performed by: SURGERY

## 2018-04-25 PROCEDURE — 1036F TOBACCO NON-USER: CPT | Performed by: SURGERY

## 2018-04-25 PROCEDURE — 15271 SKIN SUB GRAFT TRNK/ARM/LEG: CPT | Performed by: SURGERY

## 2018-04-25 PROCEDURE — 4040F PNEUMOC VAC/ADMIN/RCVD: CPT | Performed by: SURGERY

## 2018-04-25 PROCEDURE — G8417 CALC BMI ABV UP PARAM F/U: HCPCS | Performed by: SURGERY

## 2018-04-25 PROCEDURE — G8427 DOCREV CUR MEDS BY ELIG CLIN: HCPCS | Performed by: SURGERY

## 2018-04-25 PROCEDURE — 97597 DBRDMT OPN WND 1ST 20 CM/<: CPT | Performed by: SURGERY

## 2018-04-25 PROCEDURE — 1123F ACP DISCUSS/DSCN MKR DOCD: CPT | Performed by: SURGERY

## 2018-05-02 ENCOUNTER — PROCEDURE VISIT (OUTPATIENT)
Dept: SURGERY | Age: 78
End: 2018-05-02
Payer: MEDICARE

## 2018-05-02 VITALS
DIASTOLIC BLOOD PRESSURE: 74 MMHG | WEIGHT: 218 LBS | RESPIRATION RATE: 18 BRPM | HEIGHT: 71 IN | HEART RATE: 64 BPM | BODY MASS INDEX: 30.52 KG/M2 | SYSTOLIC BLOOD PRESSURE: 155 MMHG

## 2018-05-02 DIAGNOSIS — S81.802D OPEN WOUND OF LEFT LOWER LEG, SUBSEQUENT ENCOUNTER: Primary | ICD-10-CM

## 2018-05-02 PROCEDURE — G8428 CUR MEDS NOT DOCUMENT: HCPCS | Performed by: SURGERY

## 2018-05-02 PROCEDURE — 97597 DBRDMT OPN WND 1ST 20 CM/<: CPT | Performed by: SURGERY

## 2018-05-02 PROCEDURE — 1123F ACP DISCUSS/DSCN MKR DOCD: CPT | Performed by: SURGERY

## 2018-05-02 PROCEDURE — G8417 CALC BMI ABV UP PARAM F/U: HCPCS | Performed by: SURGERY

## 2018-05-02 PROCEDURE — 15271 SKIN SUB GRAFT TRNK/ARM/LEG: CPT | Performed by: SURGERY

## 2018-05-02 PROCEDURE — 99215 OFFICE O/P EST HI 40 MIN: CPT | Performed by: SURGERY

## 2018-05-02 PROCEDURE — 4040F PNEUMOC VAC/ADMIN/RCVD: CPT | Performed by: SURGERY

## 2018-05-02 PROCEDURE — 1036F TOBACCO NON-USER: CPT | Performed by: SURGERY

## 2018-05-09 ENCOUNTER — HOSPITAL ENCOUNTER (OUTPATIENT)
Age: 78
Discharge: HOME | End: 2018-05-09
Payer: MEDICARE

## 2018-05-09 ENCOUNTER — HOSPITAL ENCOUNTER (OUTPATIENT)
Dept: GENERAL RADIOLOGY | Age: 78
Discharge: HOME | End: 2018-05-11
Payer: MEDICARE

## 2018-05-09 ENCOUNTER — NURSE ONLY (OUTPATIENT)
Dept: SURGERY | Age: 78
End: 2018-05-09

## 2018-05-09 ENCOUNTER — HOSPITAL ENCOUNTER (OUTPATIENT)
Age: 78
Discharge: HOME | End: 2018-05-11
Payer: MEDICARE

## 2018-05-09 VITALS — HEIGHT: 71 IN | BODY MASS INDEX: 30.38 KG/M2 | WEIGHT: 217 LBS | RESPIRATION RATE: 18 BRPM

## 2018-05-09 DIAGNOSIS — J43.1 PANLOBULAR EMPHYSEMA (HCC): ICD-10-CM

## 2018-05-09 DIAGNOSIS — E55.9 VITAMIN D DEFICIENCY DISEASE: ICD-10-CM

## 2018-05-09 DIAGNOSIS — I10 ESSENTIAL HYPERTENSION, BENIGN: ICD-10-CM

## 2018-05-09 DIAGNOSIS — R73.01 IMPAIRED FASTING GLUCOSE: ICD-10-CM

## 2018-05-09 DIAGNOSIS — E78.00 PURE HYPERCHOLESTEROLEMIA: ICD-10-CM

## 2018-05-09 DIAGNOSIS — S81.802D OPEN WOUND OF LEFT LOWER LEG, SUBSEQUENT ENCOUNTER: Primary | ICD-10-CM

## 2018-05-09 LAB
ABSOLUTE EOS #: 0.3 K/UL (ref 0–0.4)
ABSOLUTE IMMATURE GRANULOCYTE: NORMAL K/UL (ref 0–0.3)
ABSOLUTE LYMPH #: 2.1 K/UL (ref 1–4.8)
ABSOLUTE MONO #: 0.7 K/UL (ref 0–1)
ALBUMIN SERPL-MCNC: 4.2 G/DL (ref 3.5–5.2)
ALBUMIN/GLOBULIN RATIO: ABNORMAL (ref 1–2.5)
ALP BLD-CCNC: 80 U/L (ref 40–129)
ALT SERPL-CCNC: 22 U/L (ref 5–41)
ANION GAP SERPL CALCULATED.3IONS-SCNC: 9 MMOL/L (ref 9–17)
AST SERPL-CCNC: 21 U/L
BASOPHILS # BLD: 1 % (ref 0–2)
BASOPHILS ABSOLUTE: 0 K/UL (ref 0–0.2)
BILIRUB SERPL-MCNC: 0.43 MG/DL (ref 0.3–1.2)
BUN BLDV-MCNC: 19 MG/DL (ref 8–23)
BUN/CREAT BLD: 20 (ref 9–20)
CALCIUM SERPL-MCNC: 9.7 MG/DL (ref 8.6–10.4)
CHLORIDE BLD-SCNC: 102 MMOL/L (ref 98–107)
CHOLESTEROL/HDL RATIO: 3.2
CHOLESTEROL: 157 MG/DL
CO2: 31 MMOL/L (ref 20–31)
CREAT SERPL-MCNC: 0.96 MG/DL (ref 0.7–1.2)
DIFFERENTIAL TYPE: YES
EKG ATRIAL RATE: 64 BPM
EKG P AXIS: 65 DEGREES
EKG P-R INTERVAL: 220 MS
EKG Q-T INTERVAL: 428 MS
EKG QRS DURATION: 132 MS
EKG QTC CALCULATION (BAZETT): 441 MS
EKG R AXIS: 76 DEGREES
EKG T AXIS: 46 DEGREES
EKG VENTRICULAR RATE: 64 BPM
EOSINOPHILS RELATIVE PERCENT: 4 % (ref 0–5)
GFR AFRICAN AMERICAN: >60 ML/MIN
GFR NON-AFRICAN AMERICAN: >60 ML/MIN
GFR SERPL CREATININE-BSD FRML MDRD: ABNORMAL ML/MIN/{1.73_M2}
GFR SERPL CREATININE-BSD FRML MDRD: ABNORMAL ML/MIN/{1.73_M2}
GLUCOSE BLD-MCNC: 145 MG/DL (ref 70–99)
HCT VFR BLD CALC: 44.7 % (ref 41–53)
HDLC SERPL-MCNC: 49 MG/DL
HEMOGLOBIN: 14.9 G/DL (ref 13.5–17.5)
IMMATURE GRANULOCYTES: NORMAL %
LDL CHOLESTEROL: 82 MG/DL (ref 0–130)
LYMPHOCYTES # BLD: 26 % (ref 13–44)
MAGNESIUM: 2.3 MG/DL (ref 1.6–2.6)
MCH RBC QN AUTO: 31.4 PG (ref 26–34)
MCHC RBC AUTO-ENTMCNC: 33.3 G/DL (ref 31–37)
MCV RBC AUTO: 94.1 FL (ref 80–100)
MONOCYTES # BLD: 9 % (ref 5–9)
NRBC AUTOMATED: NORMAL PER 100 WBC
PATIENT FASTING?: YES
PDW BLD-RTO: 12.6 % (ref 12.1–15.2)
PLATELET # BLD: 292 K/UL (ref 140–450)
PLATELET ESTIMATE: NORMAL
PMV BLD AUTO: NORMAL FL (ref 6–12)
POTASSIUM SERPL-SCNC: 5.7 MMOL/L (ref 3.7–5.3)
RBC # BLD: 4.75 M/UL (ref 4.5–5.9)
RBC # BLD: NORMAL 10*6/UL
SEG NEUTROPHILS: 60 % (ref 39–75)
SEGMENTED NEUTROPHILS ABSOLUTE COUNT: 5.1 K/UL (ref 2.1–6.5)
SODIUM BLD-SCNC: 142 MMOL/L (ref 135–144)
TOTAL PROTEIN: 7.4 G/DL (ref 6.4–8.3)
TRIGL SERPL-MCNC: 131 MG/DL
TSH SERPL DL<=0.05 MIU/L-ACNC: 1.2 MIU/L (ref 0.3–5)
VITAMIN D 25-HYDROXY: 31.7 NG/ML (ref 30–100)
VLDLC SERPL CALC-MCNC: NORMAL MG/DL (ref 1–30)
WBC # BLD: 8.2 K/UL (ref 3.5–11)
WBC # BLD: NORMAL 10*3/UL

## 2018-05-09 PROCEDURE — 83036 HEMOGLOBIN GLYCOSYLATED A1C: CPT

## 2018-05-09 PROCEDURE — 85025 COMPLETE CBC W/AUTO DIFF WBC: CPT

## 2018-05-09 PROCEDURE — 80053 COMPREHEN METABOLIC PANEL: CPT

## 2018-05-09 PROCEDURE — 84443 ASSAY THYROID STIM HORMONE: CPT

## 2018-05-09 PROCEDURE — 82306 VITAMIN D 25 HYDROXY: CPT

## 2018-05-09 PROCEDURE — 36415 COLL VENOUS BLD VENIPUNCTURE: CPT

## 2018-05-09 PROCEDURE — 93005 ELECTROCARDIOGRAM TRACING: CPT

## 2018-05-09 PROCEDURE — 80061 LIPID PANEL: CPT

## 2018-05-09 PROCEDURE — 71046 X-RAY EXAM CHEST 2 VIEWS: CPT

## 2018-05-09 PROCEDURE — 83735 ASSAY OF MAGNESIUM: CPT

## 2018-05-14 ENCOUNTER — OFFICE VISIT (OUTPATIENT)
Dept: CARDIOLOGY CLINIC | Age: 78
End: 2018-05-14
Payer: MEDICARE

## 2018-05-14 VITALS
DIASTOLIC BLOOD PRESSURE: 70 MMHG | OXYGEN SATURATION: 96 % | BODY MASS INDEX: 30.4 KG/M2 | WEIGHT: 218 LBS | HEART RATE: 60 BPM | SYSTOLIC BLOOD PRESSURE: 180 MMHG

## 2018-05-14 DIAGNOSIS — R73.09 ELEVATED GLUCOSE: Primary | ICD-10-CM

## 2018-05-14 DIAGNOSIS — E87.5 SERUM POTASSIUM ELEVATED: ICD-10-CM

## 2018-05-14 LAB
ESTIMATED AVERAGE GLUCOSE: 128 MG/DL
HBA1C MFR BLD: 6.1 % (ref 4.8–5.9)

## 2018-05-14 PROCEDURE — 1036F TOBACCO NON-USER: CPT | Performed by: INTERNAL MEDICINE

## 2018-05-14 PROCEDURE — 1123F ACP DISCUSS/DSCN MKR DOCD: CPT | Performed by: INTERNAL MEDICINE

## 2018-05-14 PROCEDURE — 4040F PNEUMOC VAC/ADMIN/RCVD: CPT | Performed by: INTERNAL MEDICINE

## 2018-05-14 PROCEDURE — G8417 CALC BMI ABV UP PARAM F/U: HCPCS | Performed by: INTERNAL MEDICINE

## 2018-05-14 PROCEDURE — G8427 DOCREV CUR MEDS BY ELIG CLIN: HCPCS | Performed by: INTERNAL MEDICINE

## 2018-05-14 PROCEDURE — 99214 OFFICE O/P EST MOD 30 MIN: CPT | Performed by: INTERNAL MEDICINE

## 2018-05-16 ENCOUNTER — PROCEDURE VISIT (OUTPATIENT)
Dept: SURGERY | Age: 78
End: 2018-05-16
Payer: MEDICARE

## 2018-05-16 VITALS
HEART RATE: 66 BPM | SYSTOLIC BLOOD PRESSURE: 155 MMHG | WEIGHT: 215 LBS | BODY MASS INDEX: 29.99 KG/M2 | DIASTOLIC BLOOD PRESSURE: 77 MMHG

## 2018-05-16 DIAGNOSIS — S81.802D OPEN WOUND OF LEFT LOWER LEG, SUBSEQUENT ENCOUNTER: Primary | ICD-10-CM

## 2018-05-16 DIAGNOSIS — C49.22 LEIOMYOSARCOMA OF LEG, LEFT (HCC): ICD-10-CM

## 2018-05-16 PROCEDURE — G8417 CALC BMI ABV UP PARAM F/U: HCPCS | Performed by: SURGERY

## 2018-05-16 PROCEDURE — G8427 DOCREV CUR MEDS BY ELIG CLIN: HCPCS | Performed by: SURGERY

## 2018-05-16 PROCEDURE — 15271 SKIN SUB GRAFT TRNK/ARM/LEG: CPT | Performed by: SURGERY

## 2018-05-16 PROCEDURE — 1123F ACP DISCUSS/DSCN MKR DOCD: CPT | Performed by: SURGERY

## 2018-05-16 PROCEDURE — 97597 DBRDMT OPN WND 1ST 20 CM/<: CPT | Performed by: SURGERY

## 2018-05-16 PROCEDURE — 99214 OFFICE O/P EST MOD 30 MIN: CPT | Performed by: SURGERY

## 2018-05-16 PROCEDURE — 1036F TOBACCO NON-USER: CPT | Performed by: SURGERY

## 2018-05-16 PROCEDURE — 4040F PNEUMOC VAC/ADMIN/RCVD: CPT | Performed by: SURGERY

## 2018-05-23 ENCOUNTER — PROCEDURE VISIT (OUTPATIENT)
Dept: SURGERY | Age: 78
End: 2018-05-23
Payer: MEDICARE

## 2018-05-23 VITALS — WEIGHT: 215 LBS | BODY MASS INDEX: 30.1 KG/M2 | HEIGHT: 71 IN

## 2018-05-23 DIAGNOSIS — S81.802D OPEN WOUND OF LEFT LOWER LEG, SUBSEQUENT ENCOUNTER: Primary | ICD-10-CM

## 2018-05-23 PROCEDURE — 99213 OFFICE O/P EST LOW 20 MIN: CPT | Performed by: SURGERY

## 2018-05-23 PROCEDURE — 1123F ACP DISCUSS/DSCN MKR DOCD: CPT | Performed by: SURGERY

## 2018-05-23 PROCEDURE — G8417 CALC BMI ABV UP PARAM F/U: HCPCS | Performed by: SURGERY

## 2018-05-23 PROCEDURE — 4040F PNEUMOC VAC/ADMIN/RCVD: CPT | Performed by: SURGERY

## 2018-05-23 PROCEDURE — G8427 DOCREV CUR MEDS BY ELIG CLIN: HCPCS | Performed by: SURGERY

## 2018-05-23 PROCEDURE — 15271 SKIN SUB GRAFT TRNK/ARM/LEG: CPT | Performed by: SURGERY

## 2018-05-23 PROCEDURE — 1036F TOBACCO NON-USER: CPT | Performed by: SURGERY

## 2018-05-23 PROCEDURE — 97597 DBRDMT OPN WND 1ST 20 CM/<: CPT | Performed by: SURGERY

## 2018-05-31 ENCOUNTER — HOSPITAL ENCOUNTER (OUTPATIENT)
Dept: NURSING | Age: 78
Setting detail: INFUSION SERIES
Discharge: HOME | End: 2018-05-31
Payer: MEDICARE

## 2018-05-31 VITALS
TEMPERATURE: 97 F | RESPIRATION RATE: 18 BRPM | HEART RATE: 59 BPM | DIASTOLIC BLOOD PRESSURE: 55 MMHG | SYSTOLIC BLOOD PRESSURE: 156 MMHG

## 2018-05-31 PROCEDURE — 99212 OFFICE O/P EST SF 10 MIN: CPT

## 2018-05-31 NOTE — OP NOTE
[facility]                              [address] [address]                                 OPERATIVE REPORT    PATIENT NAME: Flora Santiago                      :        1940  MED REC NO:   [id number]                              ROOM:  ACCOUNT NO:   [de-identified]                           ADMIT DATE: 2018  PROVIDER:     Shadi Martinez    DATE OF PROCEDURE:  2018    ATTENDING SURGEON:  Shadi Martinez MD    PCP:  Eboni Qiu MD    PREOPERATIVE DIAGNOSES:  1. Left lower leg open wound. 2.  Status post excision of leiomyosarcoma. POSTOPERATIVE DIAGNOSES:  1. Left lower leg open wound. 2.  Status post excision of leiomyosarcoma. OPERATION PERFORMED:  1. Debridement of left lower leg open wound. 2.  Application of Affinity. ANESTHESIA:  Local.    INDICATIONS:  The patient is a pleasant 43-year-old white male returning  for wound debridement and application of Affinity status post excision of  leiomyosarcoma. OPERATIVE PROCEDURE:  After obtaining informed consent with discussion of  risks, benefits, and alternatives including a remote risk of bleeding,  infection, recurrence, etc., the patient was taken to the procedure room  and placed in the supine position on the operating table. His wound was  prepped and draped in a standard fashion. Topical lidocaine jelly was  applied to the wound and surrounding tissues and allowed to sit for 15  minutes. The wound was then sharply debrided off superficial granulation  tissue. Hemostasis was established with topical application of lidocaine  with epinephrine as well as selective use of Bovie cautery. A 2.5 cm x 2.5  cm piece of Affinity was selected, appropriately positioned on the wound  and held in place with Steri-Strips. It was covered with Adaptic, Kerlix,  and a compressive stocking. The patient tolerated the procedure well.   All  instruments were accounted for. He was discharged home in good condition. SPECIMENS:  None. DRAINS:  None. COMPLICATIONS:  None. IMPLANTS:  Affinity AF-1250. DISPOSITION:  Discharged home in good condition with gradual advancement of  diet and activity as tolerated. Follow up will be with me in 1 week for  wound debridement.         ELADIO Vazquez    D: 05/31/2018 9:41:04       T: 05/31/2018 10:28:47     [id number]  Job#: [id number]     Doc#: [id number]    CC:  Pierre Coronado

## 2018-05-31 NOTE — BRIEF OP NOTE
Brief Postoperative Note  ______________________________________________________________    Patient: Carl Cleveland  YOB: 1940  MRN: [id number]  Date of Procedure: 5/31/3018    Pre-Op Diagnosis:                                             1.  L lower leg open wound                                            2.  s/p excision leiomyosarcoma     Post-Op Diagnosis:                                             1.  L lower leg open wound                                            2.  s/p excision leiomyosarcoma     Procedure(s):                                            1.  Debridement L lower leg open wound                                            2.  Application of Affinity     Anesthesia: Anesthesia type not filed in the log.     Surgeon(s):  Pati Whitney MD     Staff:  Scrub Person First: Patrick Felipe     Estimated Blood Loss:  Less than 13RP     Complications: None     Specimens: None     Implants:                              1.  Affinity AF-1250 2.5cm x 2.5cm      Drains:  None     [facility] # [id number]    Pati Whitney MD  Date: 5/31/2018  Time: 9:36 AM

## 2018-06-06 ENCOUNTER — PROCEDURE VISIT (OUTPATIENT)
Dept: SURGERY | Age: 78
End: 2018-06-06
Payer: MEDICARE

## 2018-06-06 VITALS
BODY MASS INDEX: 30.38 KG/M2 | WEIGHT: 217 LBS | DIASTOLIC BLOOD PRESSURE: 74 MMHG | HEIGHT: 71 IN | HEART RATE: 58 BPM | SYSTOLIC BLOOD PRESSURE: 144 MMHG | RESPIRATION RATE: 18 BRPM

## 2018-06-06 DIAGNOSIS — S81.802D OPEN WOUND OF LEFT LOWER LEG, SUBSEQUENT ENCOUNTER: Primary | ICD-10-CM

## 2018-06-06 DIAGNOSIS — C49.22 LEIOMYOSARCOMA OF LEG, LEFT (HCC): ICD-10-CM

## 2018-06-06 PROCEDURE — 97597 DBRDMT OPN WND 1ST 20 CM/<: CPT | Performed by: SURGERY

## 2018-06-06 PROCEDURE — G8427 DOCREV CUR MEDS BY ELIG CLIN: HCPCS | Performed by: SURGERY

## 2018-06-06 PROCEDURE — 99213 OFFICE O/P EST LOW 20 MIN: CPT | Performed by: SURGERY

## 2018-06-06 PROCEDURE — 1123F ACP DISCUSS/DSCN MKR DOCD: CPT | Performed by: SURGERY

## 2018-06-06 PROCEDURE — 1036F TOBACCO NON-USER: CPT | Performed by: SURGERY

## 2018-06-06 PROCEDURE — 15271 SKIN SUB GRAFT TRNK/ARM/LEG: CPT | Performed by: SURGERY

## 2018-06-06 PROCEDURE — G8417 CALC BMI ABV UP PARAM F/U: HCPCS | Performed by: SURGERY

## 2018-06-06 PROCEDURE — 4040F PNEUMOC VAC/ADMIN/RCVD: CPT | Performed by: SURGERY

## 2018-06-13 ENCOUNTER — PROCEDURE VISIT (OUTPATIENT)
Dept: SURGERY | Age: 78
End: 2018-06-13
Payer: MEDICARE

## 2018-06-13 VITALS
RESPIRATION RATE: 18 BRPM | HEIGHT: 71 IN | BODY MASS INDEX: 29.96 KG/M2 | SYSTOLIC BLOOD PRESSURE: 144 MMHG | HEART RATE: 58 BPM | WEIGHT: 214 LBS | DIASTOLIC BLOOD PRESSURE: 77 MMHG

## 2018-06-13 DIAGNOSIS — S81.802D OPEN WOUND OF LEFT LOWER LEG, SUBSEQUENT ENCOUNTER: Primary | ICD-10-CM

## 2018-06-13 DIAGNOSIS — C49.22 LEIOMYOSARCOMA OF LEG, LEFT (HCC): ICD-10-CM

## 2018-06-13 PROCEDURE — G8427 DOCREV CUR MEDS BY ELIG CLIN: HCPCS | Performed by: SURGERY

## 2018-06-13 PROCEDURE — 97597 DBRDMT OPN WND 1ST 20 CM/<: CPT | Performed by: SURGERY

## 2018-06-13 PROCEDURE — 15271 SKIN SUB GRAFT TRNK/ARM/LEG: CPT | Performed by: SURGERY

## 2018-06-13 PROCEDURE — 4040F PNEUMOC VAC/ADMIN/RCVD: CPT | Performed by: SURGERY

## 2018-06-13 PROCEDURE — 1123F ACP DISCUSS/DSCN MKR DOCD: CPT | Performed by: SURGERY

## 2018-06-13 PROCEDURE — 1036F TOBACCO NON-USER: CPT | Performed by: SURGERY

## 2018-06-13 PROCEDURE — 99213 OFFICE O/P EST LOW 20 MIN: CPT | Performed by: SURGERY

## 2018-06-13 PROCEDURE — G8417 CALC BMI ABV UP PARAM F/U: HCPCS | Performed by: SURGERY

## 2018-06-28 ENCOUNTER — OFFICE VISIT (OUTPATIENT)
Dept: FAMILY MEDICINE CLINIC | Age: 78
End: 2018-06-28
Payer: MEDICARE

## 2018-06-28 ENCOUNTER — HOSPITAL ENCOUNTER (OUTPATIENT)
Dept: NURSING | Age: 78
Setting detail: INFUSION SERIES
Discharge: HOME | End: 2018-06-28
Payer: MEDICARE

## 2018-06-28 VITALS
OXYGEN SATURATION: 96 % | SYSTOLIC BLOOD PRESSURE: 124 MMHG | BODY MASS INDEX: 29.71 KG/M2 | TEMPERATURE: 97.9 F | WEIGHT: 213 LBS | DIASTOLIC BLOOD PRESSURE: 70 MMHG | HEART RATE: 56 BPM

## 2018-06-28 VITALS — HEART RATE: 65 BPM | DIASTOLIC BLOOD PRESSURE: 69 MMHG | SYSTOLIC BLOOD PRESSURE: 142 MMHG | RESPIRATION RATE: 12 BRPM

## 2018-06-28 DIAGNOSIS — G43.109 MIGRAINE WITH AURA AND WITHOUT STATUS MIGRAINOSUS, NOT INTRACTABLE: Primary | ICD-10-CM

## 2018-06-28 DIAGNOSIS — H61.23 BILATERAL IMPACTED CERUMEN: ICD-10-CM

## 2018-06-28 PROCEDURE — 99213 OFFICE O/P EST LOW 20 MIN: CPT | Performed by: NURSE PRACTITIONER

## 2018-06-28 PROCEDURE — G8417 CALC BMI ABV UP PARAM F/U: HCPCS | Performed by: NURSE PRACTITIONER

## 2018-06-28 PROCEDURE — 69210 REMOVE IMPACTED EAR WAX UNI: CPT | Performed by: NURSE PRACTITIONER

## 2018-06-28 PROCEDURE — 4040F PNEUMOC VAC/ADMIN/RCVD: CPT | Performed by: NURSE PRACTITIONER

## 2018-06-28 PROCEDURE — G8427 DOCREV CUR MEDS BY ELIG CLIN: HCPCS | Performed by: NURSE PRACTITIONER

## 2018-06-28 PROCEDURE — 1036F TOBACCO NON-USER: CPT | Performed by: NURSE PRACTITIONER

## 2018-06-28 PROCEDURE — 1123F ACP DISCUSS/DSCN MKR DOCD: CPT | Performed by: NURSE PRACTITIONER

## 2018-06-28 PROCEDURE — 99214 OFFICE O/P EST MOD 30 MIN: CPT

## 2018-06-28 RX ORDER — SUMATRIPTAN 25 MG/1
25 TABLET, FILM COATED ORAL
Qty: 15 TABLET | Refills: 1 | Status: SHIPPED | OUTPATIENT
Start: 2018-06-28 | End: 2022-03-15

## 2018-06-28 NOTE — PROGRESS NOTES
lisinopril (ZESTRIL) 30 MG tablet Take 1 tablet by mouth daily 4/9/18  Yes Demarco Doom, DO   lovastatin (MEVACOR) 40 MG tablet Take 1 tablet by mouth nightly 4/9/18  Yes Demarco Doom, DO   propranolol (INDERAL LA) 80 MG extended release capsule Take 1 capsule by mouth daily 4/9/18  Yes Demarco Doom, DO   sertraline (ZOLOFT) 50 MG tablet Take 1 tablet by mouth daily 4/9/18  Yes Demarco Doom, DO   amLODIPine (NORVASC) 5 MG tablet Take 1 tablet by mouth 2 times daily 4/9/18  Yes Demarco Doom, DO   tiotropium (SPIRIVA HANDIHALER) 18 MCG inhalation capsule Inhale 1 capsule into the lungs daily 4/9/18  Yes Demarco Doom, DO   Multiple Vitamin (MULTI VITAMIN DAILY PO) Take by mouth daily   Yes Historical Provider, MD   Docusate Calcium (STOOL SOFTENER PO) Take  by mouth daily. Yes Historical Provider, MD   albuterol (PROVENTIL) (2.5 MG/3ML) 0.083% nebulizer solution Take 3 mLs by nebulization every 6 hours as needed for Wheezing 5/12/17   [place]ia Henry MD        Allergies:       Patient has no known allergies. Social History:     Tobacco:    reports that he quit smoking about 14 years ago. His smoking use included Cigarettes. He started smoking about 59 years ago. He has never used smokeless tobacco.  Alcohol:      reports that he drinks about 0.6 oz of alcohol per week . Drug Use:  reports that he does not use drugs. Family History:     No family history on file. Review of Systems:         Review of Systems   Constitutional: Negative for chills and fever. HENT: Positive for hearing loss (mild bilat). Negative for ear pain and tinnitus. Respiratory: Negative for cough and shortness of breath. Cardiovascular: Negative for chest pain and palpitations. Gastrointestinal: Negative for diarrhea, nausea and vomiting. Neurological: Positive for headaches. Negative for sensory change and focal weakness.          Physical Exam:     Vitals:  /70   Pulse 56   Temp 97.9 °F (36.6 °C)

## 2018-06-28 NOTE — OP NOTE
[facility]                              [address] [address]                                 OPERATIVE REPORT    PATIENT NAME: Allan Jacob                      :        1940  MED REC NO:   [id number]                              ROOM:  ACCOUNT NO:   [de-identified]                           ADMIT DATE: 2018  PROVIDER:     Shahana Rowland    DATE OF PROCEDURE:  2018    ATTENDING SURGEON:  Shahana Rowland MD    PCP:  Dr. Corinne Jericho. PREOPERATIVE DIAGNOSIS  Left lower leg open wound. POSTOPERATIVE DIAGNOSES:  1. Left lower leg open wound. 2.  Status post excision leiomyosarcoma. POSTOPERATIVE DIAGNOSES:  1. Left lower leg open wound. 2.  Status post excision leiomyosarcoma. OPERATIONS:  1.  Debridement of left lower leg open wound. 2.  Application of Affinity. ANESTHESIA:  Topical lidocaine. INDICATIONS:  The patient is a 70-year-old white male, returning for wound  debridement and application of Affinity status post excision of  leiomyosarcoma. OPERATIVE PROCEDURE:  After obtaining informed consent with discussion of  risks, benefits and alternatives including a remote risk of bleeding,  infection, recurrence, etc., the patient was taken to the procedure room  and placed in the supine position on the operating room table. His wound  was prepped and draped in standard fashion. Topical lidocaine jelly was  applied to the wound and the surrounding tissues and allowed to sit for 15  minutes. The wound was sharply debrided of superficial granulation tissue. Hemostasis was established with topical application of lidocaine with  epinephrine and selective use of Bovie cautery. A 2.5 cm x 2.5 cm piece of  Affinity was selected, appropriately positioned on the wound and held in  place with Steri-Strips. It was then covered with Adaptic, Kerlix and a  compressive stocking.   The patient tolerated the procedure well. All  instruments were accounted for. He was discharged home in good condition. SPECIMENS:  None. DRAINS:  None. COMPLICATIONS:  None. IMPLANTS:  Affinity AF-1250    DISPOSITION:  Discharged home with gradual advancement of diet and activity  as tolerated with instructions for leg elevation when at rest.  Follow up  will be with me in 1 week for a wound check.         ELADIO Otoole    D: 06/28/2018 11:07:05       T: 06/28/2018 11:10:07     [email or web address]  Job#: [id number]     Doc#: [id number]    CC:  Wilmar Sesay

## 2018-07-01 ASSESSMENT — ENCOUNTER SYMPTOMS
NAUSEA: 0
DIARRHEA: 0
SHORTNESS OF BREATH: 0
COUGH: 0
VOMITING: 0

## 2018-07-02 NOTE — PROGRESS NOTES
Dr. Nathalie Torres completes dressing change with Deepika Stapleton assisting. Pt tolerates well. Affinity implant used. Implant information:  Donor# : [id number]  ID#: [id number]  Expiration: 07/02/2018  Product: AF-1250 - Affinity 2.5x2.5cm.

## 2018-07-12 ENCOUNTER — OFFICE VISIT (OUTPATIENT)
Dept: SURGERY | Age: 78
End: 2018-07-12
Payer: MEDICARE

## 2018-07-12 VITALS
DIASTOLIC BLOOD PRESSURE: 70 MMHG | WEIGHT: 213 LBS | HEART RATE: 58 BPM | HEIGHT: 71 IN | BODY MASS INDEX: 29.82 KG/M2 | SYSTOLIC BLOOD PRESSURE: 146 MMHG

## 2018-07-12 DIAGNOSIS — C49.22 LEIOMYOSARCOMA OF LEG, LEFT (HCC): ICD-10-CM

## 2018-07-12 DIAGNOSIS — S81.802D OPEN WOUND OF LEFT LOWER LEG, SUBSEQUENT ENCOUNTER: Primary | ICD-10-CM

## 2018-07-12 PROCEDURE — 97597 DBRDMT OPN WND 1ST 20 CM/<: CPT | Performed by: SURGERY

## 2018-07-16 NOTE — PROGRESS NOTES
History     Social History    Marital status:      Spouse name: Danyell Webber Number of children: N/A    Years of education: N/A     Social History Main Topics    Smoking status: Former Smoker     Types: Cigarettes     Start date: 1959     Quit date: 8/15/2003    Smokeless tobacco: Never Used    Alcohol use 0.6 oz/week     1 Cans of beer per week      Comment: occasional    Drug use: No    Sexual activity: Not Currently     Other Topics Concern    None     Social History Narrative    None       Objective:   Physical Exam   Constitutional: He is oriented to person, place, and time. He appears well-developed and well-nourished. No distress. HENT:   Head: Normocephalic and atraumatic. Eyes: Conjunctivae are normal. Pupils are equal, round, and reactive to light. No scleral icterus. Neck: No tracheal deviation present. Cardiovascular: Normal rate. Pulmonary/Chest: Effort normal. No respiratory distress. Musculoskeletal: He exhibits no edema. Neurological: He is alert and oriented to person, place, and time. Skin: Skin is warm and dry. L lower leg wound continues to granulate well. Contraction continues. Granulation tissue sharply debrided. Dressing changed. Psychiatric: He has a normal mood and affect. His behavior is normal. Judgment and thought content normal.   Nursing note and vitals reviewed. Assessment:       Diagnosis Orders   1. Open wound of left lower leg, subsequent encounter     2. Leiomyosarcoma of leg, left (HCC)           Plan:      Continue local wound care. Follow up wound check in 1-2 weeks.

## 2018-07-23 ENCOUNTER — HOSPITAL ENCOUNTER (OUTPATIENT)
Dept: NURSING | Age: 78
Setting detail: INFUSION SERIES
Discharge: HOME | End: 2018-07-23
Payer: MEDICARE

## 2018-07-23 ENCOUNTER — OFFICE VISIT (OUTPATIENT)
Dept: SURGERY | Age: 78
End: 2018-07-23
Payer: MEDICARE

## 2018-07-23 DIAGNOSIS — Z51.89 VISIT FOR WOUND CHECK: ICD-10-CM

## 2018-07-23 DIAGNOSIS — C49.22 LEIOMYOSARCOMA OF LEG, LEFT (HCC): ICD-10-CM

## 2018-07-23 DIAGNOSIS — S81.802D OPEN WOUND OF LEFT LOWER LEG, SUBSEQUENT ENCOUNTER: Primary | ICD-10-CM

## 2018-07-23 PROCEDURE — 1036F TOBACCO NON-USER: CPT | Performed by: SURGERY

## 2018-07-23 PROCEDURE — G8417 CALC BMI ABV UP PARAM F/U: HCPCS | Performed by: SURGERY

## 2018-07-23 PROCEDURE — 4040F PNEUMOC VAC/ADMIN/RCVD: CPT | Performed by: SURGERY

## 2018-07-23 PROCEDURE — 1101F PT FALLS ASSESS-DOCD LE1/YR: CPT | Performed by: SURGERY

## 2018-07-23 PROCEDURE — G8428 CUR MEDS NOT DOCUMENT: HCPCS | Performed by: SURGERY

## 2018-07-23 PROCEDURE — 99214 OFFICE O/P EST MOD 30 MIN: CPT

## 2018-07-23 PROCEDURE — 1123F ACP DISCUSS/DSCN MKR DOCD: CPT | Performed by: SURGERY

## 2018-07-23 PROCEDURE — 97597 DBRDMT OPN WND 1ST 20 CM/<: CPT | Performed by: SURGERY

## 2018-07-23 RX ORDER — BACITRACIN ZINC AND POLYMYXIN B SULFATE 500; 1000 [USP'U]/G; [USP'U]/G
OINTMENT TOPICAL
Qty: 30 G | Refills: 1 | Status: SHIPPED | OUTPATIENT
Start: 2018-07-23 | End: 2018-08-06

## 2018-07-23 NOTE — OP NOTE
[facility]                              [address] [address]                                 OPERATIVE REPORT    PATIENT NAME: Yasmin Traylor                      :        1940  MED REC NO:   [id number]                              ROOM:  ACCOUNT NO:   [de-identified]                           ADMIT DATE: 2018  PROVIDER:     Yoly Myrick        DATE OF PROCEDURE:  2018    ATTENDING SURGEON:  Yoly Myrick MD    PCP:  Dr. Velasco Messenger:  1. Left lower leg open wound. 2.  Status post excision leiomyosarcoma. POSTOPERATIVE DIAGNOSES:  1. Left lower leg open wound. 2.  Status post excision leiomyosarcoma. OPERATION PERFORMED:  Debridement, left lower leg open wound. ANESTHESIA:  Local.    INDICATIONS:  The patient is a pleasant 80-year-old white male who is  status post excision of leiomyosarcoma. He returns for wound debridement. OPERATIVE PROCEDURE:  After obtaining the informed consent with discussion  of risks, benefits and alternatives including a remote risk of bleeding,  infection, recurrence, etc., the patient was placed in the supine position  on the operating table. His wound was prepped and draped in the standard  fashion. Topical lidocaine jelly was applied to the wound and the  surrounding tissues and allowed to sit for 15 minutes. The wound was then  sharply debrided off of superficial granulation tissue. Hemostasis was  established with topical application of lidocaine with epinephrine as well  as selective use of Bovie cautery. The wound was then dressed with  Adaptic, bacitracin, Kerlix and a compressive stocking. The patient  tolerated the procedure well. All instruments were accounted for the day. The patient was discharged home in good condition. SPECIMENS:  None. DRAINS:  None. COMPLICATIONS:  None.     DISPOSITION:  Discharged home in good condition with gradual advancement of  diet and activity as tolerated with instructions for local wound care. Followup will be with me in 2 weeks for reevaluation and debridement of  wound.         Khadijah Henson    D: 07/23/2018 11:33:37       T: 07/23/2018 11:36:35     [email or web address]  Job#: [id number]     Doc#: [id number]    CC:  Fallon Villegas

## 2018-07-23 NOTE — PROGRESS NOTES
Pre-Op Diagnosis:                                             1.  L lower leg open wound                                            2.  s/p excision leiomyosarcoma     Post-Op Diagnosis:                                             1.  L lower leg open wound                                            2.  s/p excision leiomyosarcoma     Procedure(s):                                            1.  Debridement L lower leg open wound     Anesthesia:  Local     Surgeon(s):  Aleida Rose MD     Staff:  Scrub Person First:      Estimated Blood Loss:  Less than 04WG     Complications: None     Specimens: None     Implants:  None     Drains:  None     Findings:   As above     Dictated # [id number]

## 2018-08-21 ENCOUNTER — OFFICE VISIT (OUTPATIENT)
Dept: SURGERY | Age: 78
End: 2018-08-21

## 2018-08-21 VITALS
HEART RATE: 60 BPM | WEIGHT: 212 LBS | HEIGHT: 71 IN | BODY MASS INDEX: 29.68 KG/M2 | DIASTOLIC BLOOD PRESSURE: 80 MMHG | RESPIRATION RATE: 18 BRPM | SYSTOLIC BLOOD PRESSURE: 145 MMHG

## 2018-08-21 DIAGNOSIS — Z51.89 VISIT FOR WOUND CHECK: Primary | ICD-10-CM

## 2018-08-21 PROCEDURE — 99024 POSTOP FOLLOW-UP VISIT: CPT | Performed by: SURGERY

## 2018-08-21 NOTE — PROGRESS NOTES
Subjective:      Patient ID: Cipriano Maldonado is a 66 y.o. male. HPI     Mr Jose G Vigil returns for follow up wound check after excision of leiomyosarcoma L lower leg, multiple grafts. He is doing very well. No complaints. No pain.     Review of Systems     Past Medical History:   Diagnosis Date    Cancer [facility])     left lower leg    COPD (chronic obstructive pulmonary disease) ([address]     Hyperlipidemia     Hypertension     Migraine        Past Surgical History:   Procedure Laterality Date    EXCISION / BIOPSY SKIN LESION OF LEG Left 11/13/2017    LEG LESION BIOPSY EXCISION-EXCISION LEFT LEG LESION performed by Kingston Muller MD at [place] [address] / BIOPSY SKIN LESION OF LEG  11/22/2017    LEG LESION BIOPSY EXCISION performed by Kingston Muller MD at [address] [place] [address] OFFICE/OUTPT VISIT,PROCEDURE ONLY Left 1/24/2018    LEG DEBRIDEMENT SKIN GRAFT--LEFT LEG DEBRIDEMENT WITH APLIGRAFT APPLICATION performed by Kingston Muller MD at [address] OFFICE[address]OUTPT [address] Left 2/1/2018    LEG ULCER DEBRIDEMENT APPLICATION OF APLIGRAF performed by Kingston Muller MD at [address] OFFICE/OUTPT VISIT,PROCEDURE ONLY Left 2/12/2018    LEG ULCER DEBRIDEMENT APPLICATION OF APLIGRAF----Application Apligraf ,Debridement Left Lower Leg Wound performed by Kingston Muller MD at [address] OFFICE/OUTPT [address] Left 2/26/2018    LEG ULCER DEBRIDEMENT APPLICATION OF APLIGRAF performed by Kingston Muller MD at [address] OFFICE/OUTPT [address] Left 3/12/2018    LEG ULCER DEBRIDEMENT APPLICATION OF [place]USHI[place] LEFT LOWER LEG performed by Kingston Muller MD at [address] OFFICE/OUTPT [address] Left 3/19/2018    LEG ULCER DEBRIDEMENT APPLICATION OF [place] performed by Kingston Muller MD at [address] OFFICE/OUTPT [address] Left 3/26/2018    LEG ULCER DEBRIDEMENT APPLICATION OF NUSHIELD LEFT LOWER LEG performed by Osito Winter MD   10 mL at 02/01/18 7226       Social History     Social History    Marital status:      Spouse name: Sangeeta Pelayo Number of children: N/A    Years of education: N/A     Social History Main Topics    Smoking status: Former Smoker     Types: Cigarettes     Start date: 1959     Quit date: 8/15/2003    Smokeless tobacco: Never Used    Alcohol use 0.6 oz/week     1 Cans of beer per week      Comment: occasional    Drug use: No    Sexual activity: Not Currently     Other Topics Concern    None     Social History Narrative    None       Objective:   Physical Exam   Constitutional: He is oriented to person, place, and time. He appears well-developed and well-nourished. No distress. HENT:   Head: Normocephalic and atraumatic. Eyes: Pupils are equal, round, and reactive to light. Conjunctivae are normal. No scleral icterus. Neck: No tracheal deviation present. Cardiovascular: Normal rate. Pulmonary/Chest: Effort normal. No respiratory distress. Musculoskeletal: He exhibits no edema. Neurological: He is alert and oriented to person, place, and time. Skin: Skin is warm and dry. L lower leg wound is now well epithelialized. Minimal debris. Dressing changed. Psychiatric: He has a normal mood and affect. His behavior is normal. Judgment and thought content normal.   Nursing note and vitals reviewed. Assessment:       Diagnosis Orders   1. Visit for wound check           Plan:      Mr Yuki Steward is doing very well. Wound has completely epithelialized. No debridement required at this point. Continue keeping wound covered for the next month, protect from trauma. Follow up with me in 1-2 months, or prn.         Polo Richards MD

## 2018-08-21 NOTE — COMMUNICATION BODY
Assessment:      Diagnosis Orders   1. Visit for wound check           Plan:     Mr Lynsey Rain is doing very well. Wound has completely epithelialized. No debridement required at this point. Continue keeping wound covered for the next month, protect from trauma. Follow up with me in 1-2 months, or prn.

## 2018-08-21 NOTE — LETTER
[address] [address] Specialist - Bacilio Russo  [address] [place] [id number]  Phone: [phone]  Fax: [phone]    Inez Amezquita MD        August 21, 2018     Devante Post MD  [address] [place], [place] [id number]    Patient: Meena Cleveland  MR Number: [id number]  YOB: 1940  Date of Visit: 8/21/2018    Dear Dr. Devante Post: Thank you for the request for consultation for [facility] to me for the evaluation of L lower leg wound. Below are the relevant portions of my assessment and plan of care. Assessment:      Diagnosis Orders   1. Visit for wound check           Plan:     Mr Enid Mas is doing very well. Wound has completely epithelialized. No debridement required at this point. Continue keeping wound covered for the next month, protect from trauma. Follow up with me in 1-2 months, or prn. If you have questions, please do not hesitate to call me. I look forward to following Myles Webb along with you.     Sincerely,        Inez Amezquita MD

## 2018-10-09 ENCOUNTER — OFFICE VISIT (OUTPATIENT)
Dept: FAMILY MEDICINE CLINIC | Age: 78
End: 2018-10-09
Payer: MEDICARE

## 2018-10-09 VITALS
SYSTOLIC BLOOD PRESSURE: 138 MMHG | DIASTOLIC BLOOD PRESSURE: 70 MMHG | HEART RATE: 64 BPM | OXYGEN SATURATION: 94 % | BODY MASS INDEX: 30.27 KG/M2 | WEIGHT: 217 LBS

## 2018-10-09 DIAGNOSIS — E78.00 PURE HYPERCHOLESTEROLEMIA: ICD-10-CM

## 2018-10-09 DIAGNOSIS — I10 ESSENTIAL HYPERTENSION, BENIGN: Primary | ICD-10-CM

## 2018-10-09 DIAGNOSIS — R73.9 HYPERGLYCEMIA: ICD-10-CM

## 2018-10-09 DIAGNOSIS — F34.1 DYSTHYMIC DISORDER: ICD-10-CM

## 2018-10-09 DIAGNOSIS — J43.1 PANLOBULAR EMPHYSEMA (HCC): ICD-10-CM

## 2018-10-09 PROCEDURE — 3023F SPIROM DOC REV: CPT | Performed by: FAMILY MEDICINE

## 2018-10-09 PROCEDURE — 1101F PT FALLS ASSESS-DOCD LE1/YR: CPT | Performed by: FAMILY MEDICINE

## 2018-10-09 PROCEDURE — G8484 FLU IMMUNIZE NO ADMIN: HCPCS | Performed by: FAMILY MEDICINE

## 2018-10-09 PROCEDURE — 1036F TOBACCO NON-USER: CPT | Performed by: FAMILY MEDICINE

## 2018-10-09 PROCEDURE — G8417 CALC BMI ABV UP PARAM F/U: HCPCS | Performed by: FAMILY MEDICINE

## 2018-10-09 PROCEDURE — G8427 DOCREV CUR MEDS BY ELIG CLIN: HCPCS | Performed by: FAMILY MEDICINE

## 2018-10-09 PROCEDURE — 1123F ACP DISCUSS/DSCN MKR DOCD: CPT | Performed by: FAMILY MEDICINE

## 2018-10-09 PROCEDURE — 4040F PNEUMOC VAC/ADMIN/RCVD: CPT | Performed by: FAMILY MEDICINE

## 2018-10-09 PROCEDURE — 99214 OFFICE O/P EST MOD 30 MIN: CPT | Performed by: FAMILY MEDICINE

## 2018-10-09 PROCEDURE — G8926 SPIRO NO PERF OR DOC: HCPCS | Performed by: FAMILY MEDICINE

## 2018-10-09 RX ORDER — LOVASTATIN 40 MG/1
40 TABLET ORAL NIGHTLY
Qty: 90 TABLET | Refills: 3 | Status: SHIPPED | OUTPATIENT
Start: 2018-10-09 | End: 2019-04-16 | Stop reason: SDUPTHER

## 2018-10-09 RX ORDER — PROPRANOLOL HYDROCHLORIDE 80 MG/1
80 CAPSULE, EXTENDED RELEASE ORAL DAILY
Qty: 90 CAPSULE | Refills: 3 | Status: SHIPPED | OUTPATIENT
Start: 2018-10-09 | End: 2019-04-16 | Stop reason: SDUPTHER

## 2018-10-09 RX ORDER — AMLODIPINE BESYLATE 5 MG/1
5 TABLET ORAL 2 TIMES DAILY
Qty: 180 TABLET | Refills: 3 | Status: SHIPPED | OUTPATIENT
Start: 2018-10-09 | End: 2019-04-16 | Stop reason: SDUPTHER

## 2018-10-09 RX ORDER — LISINOPRIL 30 MG/1
30 TABLET ORAL DAILY
Qty: 90 TABLET | Refills: 3 | Status: SHIPPED | OUTPATIENT
Start: 2018-10-09 | End: 2019-04-16 | Stop reason: SDUPTHER

## 2018-10-09 ASSESSMENT — ENCOUNTER SYMPTOMS
VOMITING: 0
ABDOMINAL PAIN: 0
WHEEZING: 0
SHORTNESS OF BREATH: 0
HEMOPTYSIS: 0
BLURRED VISION: 0
COUGH: 0
EYE REDNESS: 0
EYE DISCHARGE: 0
SPUTUM PRODUCTION: 0
TROUBLE SWALLOWING: 0
RHINORRHEA: 0
NAUSEA: 0
SORE THROAT: 0
CONSTIPATION: 0
DIARRHEA: 0
DIFFICULTY BREATHING: 0
BLOOD IN STOOL: 0

## 2018-10-09 ASSESSMENT — COPD QUESTIONNAIRES: COPD: 1

## 2018-10-09 NOTE — PATIENT INSTRUCTIONS
SURVEY:    You may be receiving a survey from [employer] regarding your visit today. Please complete the survey to enable us to provide the highest quality of care to you and your family. If you cannot score us a very good on any question, please call the office to discuss how we could have made your experience a very good one. Thank you.

## 2018-10-09 NOTE — PROGRESS NOTES
3/19/2018    LEG ULCER DEBRIDEMENT APPLICATION OF NUSHIELD performed by Francis Boone MD at [address] OFFICE/OUTPT [address] [facility] [address] Left 3/26/2018    LEG ULCER DEBRIDEMENT APPLICATION OF NUSHIELD LEFT LOWER LEG performed by Francis Boone MD at [address] OFFICE/OUTPT [address] [facility] [address] Left 4/2/2018    LEG ULCER DEBRIDEMENT APPLICATION OF Vidhi Schiller- LEFT LEG performed by Francis Boone MD at [address] OFFICE/OUTPT [address] [facility] [address] Left 4/11/2018    LEG ULCER DEBRIDEMENT APPLICATION OF NUSHIELD-LEFT LEG performed by Francis Boone MD at [address] OFFICE/OUTPT [address] [facility] [address] Left 4/18/2018    LEG DEBRIDEMENT SKIN GRAFT- APPLICATION OF AFFINITY performed by Francis Boone MD at [address] Left 11/22/2017    SKIN GRAFT SPLIT THICKNESS performed by Francis Boone MD at [address] [facility]          Medications:       Prior to Admission medications    Medication Sig Start Date End Date Taking?  Authorizing Provider   lisinopril (ZESTRIL) 30 MG tablet Take 1 tablet by mouth daily 10/9/18  Yes Ilda Santoyo MD   lovastatin (MEVACOR) 40 MG tablet Take 1 tablet by mouth nightly 10/9/18  Yes Ilda Santoyo MD   propranolol (INDERAL LA) 80 MG extended release capsule Take 1 capsule by mouth daily 10/9/18  Yes Ilda Santoyo MD   sertraline (ZOLOFT) 50 MG tablet Take 1 tablet by mouth daily 10/9/18  Yes Ilda Santoyo MD   amLODIPine (NORVASC) 5 MG tablet Take 1 tablet by mouth 2 times daily 10/9/18  Yes Ilda Santoyo MD   tiotropium (Marval Harpin) 18 MCG inhalation capsule Inhale 1 capsule into the lungs daily 10/9/18  Yes Ilda Santoyo MD   Multiple Vitamin (MULTI VITAMIN DAILY PO) Take by mouth daily   Yes Historical Provider, MD   albuterol (PROVENTIL) (2.5 MG/3ML) 0.083% nebulizer solution Take 3 mLs by nebulization every 6 hours as needed for Wheezing 5/12/17  Yes Aly Santoyo MD   Docusate Calcium (STOOL SOFTENER PO) Take  by mouth daily. Yes Historical Provider, MD   RaNITidine HCl (RANITIDINE 150 MAX STRENGTH PO) Take by mouth daily as needed    Historical Provider, MD   SUMAtriptan (IMITREX) 25 MG tablet Take 1 tablet by mouth once as needed for Migraine 6/28/18 6/28/18  CindygeeLEDY Meehan CNP        Allergies:       Patient has no known allergies. Social History:     Tobacco:    reports that he quit smoking about 15 years ago. His smoking use included Cigarettes. He started smoking about 59 years ago. He has a 216.00 pack-year smoking history. He has never used smokeless tobacco.  Alcohol:      reports that he drinks about 0.6 oz of alcohol per week . Drug Use:  reports that he does not use drugs. Family History:     History reviewed. No pertinent family history. Review of Systems:       Review of Systems   Constitutional: Negative for chills, fatigue and fever. HENT: Negative for congestion, ear pain, rhinorrhea, sore throat and trouble swallowing. Eyes: Negative for blurred vision, discharge, redness and visual disturbance. Respiratory: Negative for cough, hemoptysis, sputum production, shortness of breath and wheezing. Cardiovascular: Negative for chest pain and palpitations. Gastrointestinal: Negative for abdominal pain, blood in stool, constipation, diarrhea, nausea and vomiting. Genitourinary: Negative for dysuria and hematuria. Musculoskeletal: Negative for joint swelling and neck stiffness. Skin: Negative for pallor and rash. Neurological: Negative for dizziness, tremors, light-headedness and headaches. Psychiatric/Behavioral: Negative for confusion and sleep disturbance. Physical Exam:     Physical Exam   Constitutional: He is oriented to person, place, and time. He appears well-developed and well-nourished. HENT:   Head: Normocephalic and atraumatic. Eyes: Pupils are equal, round, and reactive to light. Conjunctivae are normal. Right eye exhibits no discharge.  Left eye exhibits no discharge. Neck: Neck supple. No thyromegaly present. Cardiovascular: Normal rate and regular rhythm. No murmur heard. Pulmonary/Chest: Effort normal and breath sounds normal. No respiratory distress. He has no wheezes. Abdominal: Soft. Bowel sounds are normal. He exhibits no distension. There is no tenderness. Musculoskeletal: He exhibits no edema. Lymphadenopathy:     He has no cervical adenopathy. Neurological: He is alert and oriented to person, place, and time. Skin: Skin is warm and dry. No rash noted. No erythema. Lt lower leg-- anterior skin wound healed from Dr Gabby Shanks. C/D/I with new pink skin. Pt has covered normally. Psychiatric: He has a normal mood and affect. His behavior is normal.   Vitals reviewed. Vitals:  /70   Pulse 64   Wt 217 lb (98.4 kg)   SpO2 94%   BMI 30.27 kg/m²       Data:     Lab Results   Component Value Date     05/09/2018    K 5.7 05/09/2018     05/09/2018    CO2 31 05/09/2018    BUN 19 05/09/2018    CREATININE 0.96 05/09/2018    GLUCOSE 145 05/09/2018    PROT 7.4 05/09/2018    LABALBU 4.2 05/09/2018    BILITOT 0.43 05/09/2018    ALKPHOS 80 05/09/2018    AST 21 05/09/2018    ALT 22 05/09/2018     Lab Results   Component Value Date    WBC 8.2 05/09/2018    RBC 4.75 05/09/2018    HGB 14.9 05/09/2018    HCT 44.7 05/09/2018    MCV 94.1 05/09/2018    MCH 31.4 05/09/2018    MCHC 33.3 05/09/2018    RDW 12.6 05/09/2018     05/09/2018    MPV NOT REPORTED 05/09/2018     Lab Results   Component Value Date    TSH 1.20 05/09/2018     Lab Results   Component Value Date    CHOL 157 05/09/2018    CHOL 154.0 09/27/2016    HDL 49 05/09/2018    PSA 0.73 05/04/2015    LABA1C 6.1 05/09/2018          Assessment/Plan:       1. Essential hypertension, benign  Stable on the zestril and norvasc    2. Pure hypercholesterolemia  Stable on mevacor    3. Panlobular emphysema (HCC)  Stable on spiriva    4.  Dysthymic disorder  Stable on zoloft  - (goal LDL reduction with dx if diabetes is 50% LDL reduction)    PHQ Scores 4/9/2018   PHQ2 Score 2   PHQ9 Score 2     Interpretation of Total Score Depression Severity: 1-4 = Minimal depression, 5-9 = Mild depression, 10-14 = Moderate depression, 15-19 = Moderately severe depression, 20-27 = Severe depression        Return in about 6 months (around 4/9/2019) for HTN, Hyperlipidemia, COPD.       Electronically signed by Ladarius Flores MD on 10/9/2018 at 1:00 PM

## 2019-01-16 ENCOUNTER — PATIENT MESSAGE (OUTPATIENT)
Dept: FAMILY MEDICINE CLINIC | Age: 79
End: 2019-01-16

## 2019-04-16 ENCOUNTER — OFFICE VISIT (OUTPATIENT)
Dept: FAMILY MEDICINE CLINIC | Age: 79
End: 2019-04-16
Payer: MEDICARE

## 2019-04-16 VITALS
DIASTOLIC BLOOD PRESSURE: 70 MMHG | OXYGEN SATURATION: 96 % | HEIGHT: 71 IN | BODY MASS INDEX: 29.82 KG/M2 | HEART RATE: 66 BPM | WEIGHT: 213 LBS | SYSTOLIC BLOOD PRESSURE: 138 MMHG

## 2019-04-16 DIAGNOSIS — F34.1 DYSTHYMIC DISORDER: ICD-10-CM

## 2019-04-16 DIAGNOSIS — R73.9 HYPERGLYCEMIA: ICD-10-CM

## 2019-04-16 DIAGNOSIS — E78.00 PURE HYPERCHOLESTEROLEMIA: ICD-10-CM

## 2019-04-16 DIAGNOSIS — J43.1 PANLOBULAR EMPHYSEMA (HCC): ICD-10-CM

## 2019-04-16 DIAGNOSIS — I10 ESSENTIAL HYPERTENSION, BENIGN: Primary | ICD-10-CM

## 2019-04-16 LAB — HBA1C MFR BLD: 5.8 %

## 2019-04-16 PROCEDURE — 99214 OFFICE O/P EST MOD 30 MIN: CPT | Performed by: FAMILY MEDICINE

## 2019-04-16 PROCEDURE — 4040F PNEUMOC VAC/ADMIN/RCVD: CPT | Performed by: FAMILY MEDICINE

## 2019-04-16 PROCEDURE — 1036F TOBACCO NON-USER: CPT | Performed by: FAMILY MEDICINE

## 2019-04-16 PROCEDURE — G8926 SPIRO NO PERF OR DOC: HCPCS | Performed by: FAMILY MEDICINE

## 2019-04-16 PROCEDURE — 1123F ACP DISCUSS/DSCN MKR DOCD: CPT | Performed by: FAMILY MEDICINE

## 2019-04-16 PROCEDURE — 3023F SPIROM DOC REV: CPT | Performed by: FAMILY MEDICINE

## 2019-04-16 PROCEDURE — 83036 HEMOGLOBIN GLYCOSYLATED A1C: CPT | Performed by: FAMILY MEDICINE

## 2019-04-16 PROCEDURE — G8417 CALC BMI ABV UP PARAM F/U: HCPCS | Performed by: FAMILY MEDICINE

## 2019-04-16 PROCEDURE — G8427 DOCREV CUR MEDS BY ELIG CLIN: HCPCS | Performed by: FAMILY MEDICINE

## 2019-04-16 RX ORDER — PROPRANOLOL HYDROCHLORIDE 80 MG/1
80 CAPSULE, EXTENDED RELEASE ORAL DAILY
Qty: 90 CAPSULE | Refills: 3 | Status: SHIPPED | OUTPATIENT
Start: 2019-04-16 | End: 2019-10-16 | Stop reason: SDUPTHER

## 2019-04-16 RX ORDER — RANITIDINE 150 MG/1
TABLET ORAL
Qty: 90 TABLET | Refills: 0 | Status: CANCELLED | OUTPATIENT
Start: 2019-04-16

## 2019-04-16 RX ORDER — AMLODIPINE BESYLATE 5 MG/1
5 TABLET ORAL 2 TIMES DAILY
Qty: 180 TABLET | Refills: 3 | Status: SHIPPED | OUTPATIENT
Start: 2019-04-16 | End: 2019-10-16 | Stop reason: SDUPTHER

## 2019-04-16 RX ORDER — LISINOPRIL 30 MG/1
30 TABLET ORAL DAILY
Qty: 90 TABLET | Refills: 3 | Status: SHIPPED | OUTPATIENT
Start: 2019-04-16 | End: 2019-10-16 | Stop reason: SDUPTHER

## 2019-04-16 RX ORDER — LOVASTATIN 40 MG/1
40 TABLET ORAL NIGHTLY
Qty: 90 TABLET | Refills: 3 | Status: SHIPPED | OUTPATIENT
Start: 2019-04-16 | End: 2019-10-16 | Stop reason: SDUPTHER

## 2019-04-16 ASSESSMENT — ENCOUNTER SYMPTOMS
BLOOD IN STOOL: 0
CONSTIPATION: 0
COUGH: 0
SPUTUM PRODUCTION: 0
HEMOPTYSIS: 0
DIARRHEA: 0
RHINORRHEA: 0
EYE REDNESS: 0
BLURRED VISION: 0
ABDOMINAL PAIN: 0
EYE DISCHARGE: 0
TROUBLE SWALLOWING: 0
WHEEZING: 0
DIFFICULTY BREATHING: 0
VOMITING: 0
SHORTNESS OF BREATH: 0
NAUSEA: 0

## 2019-04-16 ASSESSMENT — COPD QUESTIONNAIRES: COPD: 1

## 2019-04-16 NOTE — PROGRESS NOTES
HPI Notes    Name: Barber Siu  : 1940        Chief Complaint:     Chief Complaint   Patient presents with    Hypertension    Hyperlipidemia    COPD    Depression    Hyperglycemia       History of Present Illness:     Barber Siu is a 66 y.o.  male who presents with Hypertension; Hyperlipidemia; COPD; Depression; and Hyperglycemia      Hypertension   This is a chronic problem. The current episode started more than 1 year ago. The problem is unchanged. The problem is controlled. Associated symptoms include peripheral edema. Pertinent negatives include no blurred vision, chest pain, headaches, malaise/fatigue, palpitations or shortness of breath. (Lt ankle since had tumor removed from Lt lower leg.) There are no associated agents to hypertension. Risk factors for coronary artery disease include dyslipidemia and male gender. The current treatment provides significant improvement. Hyperlipidemia   This is a chronic problem. The current episode started more than 1 year ago. The problem is controlled. Recent lipid tests were reviewed and are normal. He has no history of diabetes or hypothyroidism. Pertinent negatives include no chest pain or shortness of breath. Current antihyperlipidemic treatment includes statins. The current treatment provides significant improvement of lipids. Risk factors for coronary artery disease include male sex, dyslipidemia and hypertension. COPD   There is no cough, difficulty breathing, hemoptysis, shortness of breath, sputum production or wheezing. This is a chronic problem. The current episode started more than 1 year ago. The problem has been unchanged. Pertinent negatives include no chest pain, fever, headaches, malaise/fatigue, rhinorrhea or trouble swallowing. His symptoms are aggravated by pollen and change in weather. Relieved by: spiriva. His past medical history is significant for COPD. Hyperglycemia   This is a chronic problem.  The current episode started more than 1 year ago. The problem has been unchanged (Hgba1c is 5.8). Pertinent negatives include no abdominal pain, chest pain, chills, congestion, coughing, fatigue, fever, headaches, joint swelling, nausea, rash or vomiting. Dysthymia - chronic but stable. Pt just got back from the [facility] over winter visiting children. Pt is sleeping well. Appetite is good. Pt's moods are good. 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  SO, pt keeps busy and takes the zoloft since he quit smoking  Past Medical History:     Past Medical History:   Diagnosis Date    Cancer ([facility]     left lower leg    COPD (chronic obstructive pulmonary disease) ([facility]     Hyperlipidemia     Hypertension     Migraine       Reviewed all health maintenance requirements and ordered appropriate tests  Health Maintenance Due   Topic Date Due    Shingles Vaccine (1 of 2) 05/01/1990       Past Surgical History:     Past Surgical History:   Procedure Laterality Date    EXCISION / BIOPSY SKIN LESION OF LEG Left 11/13/2017    LEG LESION BIOPSY EXCISION-EXCISION LEFT LEG LESION performed by Elaine Reyes MD at [facility] [address] OF LEG  11/22/2017    LEG LESION BIOPSY EXCISION performed by Elaine Reyes MD at [address] [facility] OFFICE/OUTPT VISIT,PROCEDURE ONLY Left 1/24/2018    LEG DEBRIDEMENT SKIN GRAFT--LEFT LEG DEBRIDEMENT WITH APLIGRAFT APPLICATION performed by Elaine Reyes MD at [address] OFFICE/OUTPT [address] [place] [address] Road Left 2/1/2018    LEG ULCER DEBRIDEMENT APPLICATION OF APLIGRAF performed by Elaine Reyes MD at [address] OFFICE/OUTPT [address] [facility] Left 2/12/2018    LEG ULCER DEBRIDEMENT APPLICATION OF APLIGRAF----Application Apligraf ,Debridement Left Lower Leg Wound performed by Elaine Reyes MD at [address] OFFICE/OUTPT VISIT,PROCEDURE ONLY Left 2/26/2018    LEG ULCER DEBRIDEMENT APPLICATION OF APLIGRAF performed by Roseann Tucker MD at [address] OFFICE/OUTPT [address] Left 3/12/2018    LEG ULCER DEBRIDEMENT APPLICATION OF NUSHIELD LEFT LOWER LEG performed by Roseann Tucker MD at [address] OFFICE/OUTPT [address] Left 3/19/2018    LEG ULCER DEBRIDEMENT APPLICATION OF NUSHIELD performed by Roseann Tucker MD at [address] OFFICE/OUTPT [address] Left 3/26/2018    LEG ULCER DEBRIDEMENT APPLICATION OF NUSHIELD LEFT LOWER LEG performed by Roseann Tucker MD at [address] OFFICE/OUTPT [address] Left 4/2/2018    LEG ULCER DEBRIDEMENT APPLICATION OF NUSHIELD- LEFT LEG performed by Roseann Tucker MD at [address] OFFICE/OUTPT [address] Left 4/11/2018    LEG ULCER DEBRIDEMENT APPLICATION OF NUSHIELD-LEFT LEG performed by Roseann Tucker MD at [address] OFFICE/OUTPT [address] Left 4/18/2018    LEG DEBRIDEMENT SKIN GRAFT- APPLICATION OF AFFINITY performed by Roseann Tucker MD at [address] Left 11/22/2017    SKIN GRAFT SPLIT THICKNESS performed by Roseann Tucker MD at [facility]          Medications:       Prior to Admission medications    Medication Sig Start Date End Date Taking?  Authorizing Provider   Multiple Vitamins-Minerals (EQ VISION FORMULA 50+) CAPS Take by mouth daily   Yes Historical Provider, MD   amLODIPine (NORVASC) 5 MG tablet Take 1 tablet by mouth 2 times daily 4/16/19  Yes Trina Rajan MD   lisinopril (ZESTRIL) 30 MG tablet Take 1 tablet by mouth daily 4/16/19  Yes Trina Rajan MD   lovastatin (MEVACOR) 40 MG tablet Take 1 tablet by mouth nightly 4/16/19  Yes Trina Rajan MD   propranolol (INDERAL LA) 80 MG extended release capsule Take 1 capsule by mouth daily 4/16/19  Yes Trina Rajan MD   sertraline (ZOLOFT) 50 MG tablet Take 1 tablet by mouth daily 4/16/19  Yes Benito MONTESINOS Toro Wheeler MD   tiotropium (SPIRIVA HANDIHALER) 18 MCG inhalation capsule Inhale 1 capsule into the lungs daily 4/16/19  Yes Caio Day MD   Multiple Vitamin (MULTI VITAMIN DAILY PO) Take by mouth daily   Yes Historical Provider, MD   albuterol (PROVENTIL) (2.5 MG/3ML) 0.083% nebulizer solution Take 3 mLs by nebulization every 6 hours as needed for Wheezing 5/12/17  Yes Jesus Kerns MD   Docusate Calcium (STOOL SOFTENER PO) Take  by mouth daily. Yes Historical Provider, MD   RaNITidine HCl (RANITIDINE 150 MAX STRENGTH PO) Take by mouth daily as needed    Historical Provider, MD   SUMAtriptan (IMITREX) 25 MG tablet Take 1 tablet by mouth once as needed for Migraine 6/28/18 6/28/18  LEDY Chamorro CNP        Allergies:       Patient has no known allergies. Social History:     Tobacco:    reports that he quit smoking about 15 years ago. His smoking use included cigarettes. He started smoking about 60 years ago. He has a 216.00 pack-year smoking history. He has never used smokeless tobacco.  Alcohol:      reports that he drinks about 0.6 oz of alcohol per week. Drug Use:  reports that he does not use drugs. Family History:     History reviewed. No pertinent family history. Review of Systems:       Review of Systems   Constitutional: Negative for chills, fatigue, fever and malaise/fatigue. HENT: Negative for congestion, rhinorrhea and trouble swallowing. Eyes: Negative for blurred vision, discharge and redness. Respiratory: Negative for cough, hemoptysis, sputum production, shortness of breath and wheezing. Cardiovascular: Negative for chest pain and palpitations. Gastrointestinal: Negative for abdominal pain, blood in stool, constipation, diarrhea, nausea and vomiting. Genitourinary: Negative for dysuria and hematuria. Musculoskeletal: Negative for joint swelling and neck stiffness. Skin: Negative for pallor and rash.    Neurological: Negative for dizziness, 05/09/2018          Assessment/Plan:        1. Essential hypertension, benign  Stable on norvasc, zestril and inderal  - amLODIPine (NORVASC) 5 MG tablet; Take 1 tablet by mouth 2 times daily  Dispense: 180 tablet; Refill: 3  - lisinopril (ZESTRIL) 30 MG tablet; Take 1 tablet by mouth daily  Dispense: 90 tablet; Refill: 3  - propranolol (INDERAL LA) 80 MG extended release capsule; Take 1 capsule by mouth daily  Dispense: 90 capsule; Refill: 3  - Lipid Panel; Future  - Comprehensive Metabolic Panel; Future    2. Pure hypercholesterolemia  Stable on mevacor  - lovastatin (MEVACOR) 40 MG tablet; Take 1 tablet by mouth nightly  Dispense: 90 tablet; Refill: 3  - Lipid Panel; Future  - Comprehensive Metabolic Panel; Future    3. Panlobular emphysema (HCC)  Stable on spiriva and NO more smoking   - tiotropium (SPIRIVA HANDIHALER) 18 MCG inhalation capsule; Inhale 1 capsule into the lungs daily  Dispense: 90 capsule; Refill: 3    4. Hyperglycemia  Osqcs3a 5.8  - POCT glycosylated hemoglobin (Hb A1C)    5. Dysthymic disorder  Stable on zoloft  - sertraline (ZOLOFT) 50 MG tablet; Take 1 tablet by mouth daily  Dispense: 90 tablet; Refill: 3        Lazaro received counseling on the following healthy behaviors: nutrition and exercise  Reviewed prior labs and health maintenance  Continue current medications, diet and exercise. Discussed use, benefit, and side effects of prescribed medications. Barriers to medication compliance addressed. Patient given educational materials - see patient instructions  Was a self-tracking handout given in paper form or via [employer]?  Yes    Requested Prescriptions     Signed Prescriptions Disp Refills    amLODIPine (NORVASC) 5 MG tablet 180 tablet 3     Sig: Take 1 tablet by mouth 2 times daily    lisinopril (ZESTRIL) 30 MG tablet 90 tablet 3     Sig: Take 1 tablet by mouth daily    lovastatin (MEVACOR) 40 MG tablet 90 tablet 3     Sig: Take 1 tablet by mouth nightly    propranolol (INDERAL LA) 80 MG extended release capsule 90 capsule 3     Sig: Take 1 capsule by mouth daily    sertraline (ZOLOFT) 50 MG tablet 90 tablet 3     Sig: Take 1 tablet by mouth daily    tiotropium (SPIRIVA HANDIHALER) 18 MCG inhalation capsule 90 capsule 3     Sig: Inhale 1 capsule into the lungs daily       All patient questions answered. Patient voiced understanding. Quality Measures    Body mass index is 29.71 kg/m². Elevated. Weight control planned discussed Healthy diet and regular exercise. BP: 138/70. Blood pressure is normal. Treatment plan consists of No treatment change needed. Fall Risk 4/16/2019 4/9/2018 10/15/2014   2 or more falls in past year? no no no   Fall with injury in past year? no no no     The patient does not have a history of falls. I did not - not indicated , complete a risk assessment for falls. A plan of care for falls No Treatment plan indicated    Lab Results   Component Value Date    LDLCALC 81 09/27/2016    LDLCHOLESTEROL 82 05/09/2018    (goal LDL reduction with dx if diabetes is 50% LDL reduction)    PHQ Scores 4/9/2018   PHQ2 Score 2   PHQ9 Score 2     Interpretation of Total Score Depression Severity: 1-4 = Minimal depression, 5-9 = Mild depression, 10-14 = Moderate depression, 15-19 = Moderately severe depression, 20-27 = Severe depression        Return in about 6 months (around 10/16/2019) for HTN, Hyperlipidemia, COPD,dysthymia.       Electronically signed by Ruthy Galvan MD on 4/16/2019 at 1:54 PM

## 2019-05-29 ENCOUNTER — OFFICE VISIT (OUTPATIENT)
Dept: FAMILY MEDICINE CLINIC | Age: 79
End: 2019-05-29
Payer: MEDICARE

## 2019-05-29 VITALS
DIASTOLIC BLOOD PRESSURE: 64 MMHG | WEIGHT: 211 LBS | SYSTOLIC BLOOD PRESSURE: 120 MMHG | HEART RATE: 84 BPM | OXYGEN SATURATION: 96 % | TEMPERATURE: 98 F | BODY MASS INDEX: 29.43 KG/M2

## 2019-05-29 DIAGNOSIS — J43.1 PANLOBULAR EMPHYSEMA (HCC): ICD-10-CM

## 2019-05-29 DIAGNOSIS — J40 BRONCHITIS: Primary | ICD-10-CM

## 2019-05-29 PROCEDURE — 4040F PNEUMOC VAC/ADMIN/RCVD: CPT | Performed by: FAMILY MEDICINE

## 2019-05-29 PROCEDURE — 1123F ACP DISCUSS/DSCN MKR DOCD: CPT | Performed by: FAMILY MEDICINE

## 2019-05-29 PROCEDURE — 96372 THER/PROPH/DIAG INJ SC/IM: CPT | Performed by: FAMILY MEDICINE

## 2019-05-29 PROCEDURE — 3023F SPIROM DOC REV: CPT | Performed by: FAMILY MEDICINE

## 2019-05-29 PROCEDURE — G8417 CALC BMI ABV UP PARAM F/U: HCPCS | Performed by: FAMILY MEDICINE

## 2019-05-29 PROCEDURE — 1036F TOBACCO NON-USER: CPT | Performed by: FAMILY MEDICINE

## 2019-05-29 PROCEDURE — G8926 SPIRO NO PERF OR DOC: HCPCS | Performed by: FAMILY MEDICINE

## 2019-05-29 PROCEDURE — G8427 DOCREV CUR MEDS BY ELIG CLIN: HCPCS | Performed by: FAMILY MEDICINE

## 2019-05-29 PROCEDURE — 99213 OFFICE O/P EST LOW 20 MIN: CPT | Performed by: FAMILY MEDICINE

## 2019-05-29 RX ORDER — ALBUTEROL SULFATE 2.5 MG/3ML
2.5 SOLUTION RESPIRATORY (INHALATION) EVERY 6 HOURS PRN
Qty: 120 EACH | Refills: 3 | Status: SHIPPED | OUTPATIENT
Start: 2019-05-29

## 2019-05-29 RX ORDER — TRIAMCINOLONE ACETONIDE 40 MG/ML
40 INJECTION, SUSPENSION INTRA-ARTICULAR; INTRAMUSCULAR ONCE
Status: COMPLETED | OUTPATIENT
Start: 2019-05-29 | End: 2019-05-29

## 2019-05-29 RX ORDER — AZITHROMYCIN 250 MG/1
TABLET, FILM COATED ORAL
Qty: 1 PACKET | Refills: 1 | Status: SHIPPED | OUTPATIENT
Start: 2019-05-29 | End: 2019-06-08

## 2019-05-29 RX ADMIN — TRIAMCINOLONE ACETONIDE 40 MG: 40 INJECTION, SUSPENSION INTRA-ARTICULAR; INTRAMUSCULAR at 14:42

## 2019-05-29 ASSESSMENT — ENCOUNTER SYMPTOMS
WHEEZING: 1
NAUSEA: 0
RHINORRHEA: 0
COUGH: 1
ABDOMINAL PAIN: 0
SORE THROAT: 0
SHORTNESS OF BREATH: 1

## 2019-05-29 NOTE — PATIENT INSTRUCTIONS
SURVEY:    You may be receiving a survey from [employer] regarding your visit today. Please complete the survey to enable us to provide the highest quality of care to you and your family. If you cannot score us a very good on any question, please call the office to discuss how we could have made your experience a very good one. Thank you.

## 2019-05-29 NOTE — PROGRESS NOTES
Name: Terrance Cleveland  : 1940         Chief Complaint:  Chief Complaint   Patient presents with    Cough     Patient complains of cough, started 4 days ago. He said his chest hurts. He does have COPD. He did do a nebulizer treatment at home. He did have a fever off and on. He does have COPD and wife said he does not use his inhalers like he should. History of Present Illness:  Katie Castellano is a 78 y.o. yr old male who presents today with Cough (Patient complains of cough, started 4 days ago. He said his chest hurts. He does have COPD. He did do a nebulizer treatment at home. He did have a fever off and on. He does have COPD and wife said he does not use his inhalers like he should. )  . Wife has been ill. Has had symptoms for 4-5 days. Cough is productive with yellow sputum. Had fever at home. Denies sore throat or nasal congestion. Has done 2 breathing treatments which have helped. Reviewed all health maintenance requirements and ordered appropriate tests.   Health Maintenance Due   Topic Date Due    Shingles Vaccine (1 of 2) 1990    Potassium monitoring  2019    Creatinine monitoring  2019       Past Medical History:   Diagnosis Date    Cancer ([facility] [address] [facility]     left lower leg    COPD [facility]chronic obstructive pulmonary disease) ([facility]     Hyperlipidemia     Hypertension     Migraine      Past Surgical History:   Procedure Laterality Date    EXCISION / BIOPSY SKIN LESION OF LEG Left 2017    LEG LESION BIOPSY EXCISION-EXCISION LEFT LEG LESION performed by Elinor Viera MD at [place] [address] / BIOPSY SKIN LESION OF LEG  2017    LEG LESION BIOPSY EXCISION performed by Elinor Viera MD at [address]      NE OFFICE/OUTPT VISIT,PROCEDURE ONLY Left 2018    LEG DEBRIDEMENT SKIN GRAFT--LEFT LEG DEBRIDEMENT WITH APLIGRAFT APPLICATION performed by Elinor Viera MD at [address] [facility] OFFICE/OUTPT [facility] [facility] Left 2/1/2018    LEG ULCER DEBRIDEMENT APPLICATION OF APLIGRAF performed by Roseann Tucker MD at [address] OFFICE/OUTPT VISIT,PROCEDURE ONLY Left 2/12/2018    LEG ULCER DEBRIDEMENT APPLICATION OF APLIGRAF----Application Apligraf ,Debridement Left Lower Leg Wound performed by Roseann Tucker MD at [address] OFFICE/OUTPT VISIT,PROCEDURE ONLY Left 2/26/2018    LEG ULCER DEBRIDEMENT APPLICATION OF APLIGRAF performed by Roseann Tucker MD at [facility] OFFICE/OUTPT VISIT,PROCEDURE ONLY Left 3/12/2018    LEG ULCER DEBRIDEMENT APPLICATION OF NUSHIELD LEFT LOWER LEG performed by Roseann Tucker MD at [address] OFFICE/OUTPT [address] Left 3/19/2018    LEG ULCER DEBRIDEMENT APPLICATION OF NUSHIELD performed by Roseann Tucker MD at [address] OFFICE/OUTPT [address] [facility] [address] Left 3/26/2018    LEG ULCER DEBRIDEMENT APPLICATION OF NUSHIELD LEFT LOWER LEG performed by Roseann Tucker MD at [address] OFFICE/OUTPT [address] [facility] [address] Left 4/2/2018    LEG ULCER DEBRIDEMENT APPLICATION OF NUSHIELD- LEFT LEG performed by Roseann Tucker MD at [address] OFFICE/OUTPT [address] [facility] [address] Left 4/11/2018    LEG ULCER DEBRIDEMENT APPLICATION OF NUSHIELD-LEFT LEG performed by Roseann Tucker MD at [address] OFFICE/OUTPT [address] [facility] [address] Left 4/18/2018    LEG DEBRIDEMENT SKIN GRAFT- APPLICATION OF AFFINITY performed by Roseann Tucker MD at [address] [facility] Left 11/22/2017    SKIN GRAFT SPLIT THICKNESS performed by Roseann Tucker MD at [address] [facility]        History reviewed. No pertinent family history. Prior to Admission medications    Medication Sig Start Date End Date Taking?  Authorizing Provider   Multiple Vitamins-Minerals (EQ VISION FORMULA 50+) CAPS Take by mouth daily   Yes Historical Provider, MD   amLODIPine (NORVASC) 5 MG tablet Take 1 tablet by mouth 2 times daily 4/16/19  Yes Trina Rajan MD   lisinopril (ZESTRIL) 30 MG tablet Take 1 tablet by mouth daily 4/16/19  Yes Leroy Jordan MD   lovastatin (MEVACOR) 40 MG tablet Take 1 tablet by mouth nightly 4/16/19  Yes Leroy Jordan MD   propranolol (INDERAL LA) 80 MG extended release capsule Take 1 capsule by mouth daily 4/16/19  Yes Leroy Jordan MD   sertraline (ZOLOFT) 50 MG tablet Take 1 tablet by mouth daily 4/16/19  Yes Leroy Jordan MD   tiotropium (Tivis Kehr) 18 MCG inhalation capsule Inhale 1 capsule into the lungs daily 4/16/19  Yes Leroy Jordan MD   RaNITidine HCl (RANITIDINE 150 MAX STRENGTH PO) Take by mouth daily as needed   Yes Historical Provider, MD   Multiple Vitamin (MULTI VITAMIN DAILY PO) Take by mouth daily   Yes Historical Provider, MD   albuterol (PROVENTIL) (2.5 MG/3ML) 0.083% nebulizer solution Take 3 mLs by nebulization every 6 hours as needed for Wheezing 5/12/17  Yes Derek Monroy MD   Docusate Calcium (STOOL SOFTENER PO) Take  by mouth daily. Yes Historical Provider, MD   SUMAtriptan (IMITREX) 25 MG tablet Take 1 tablet by mouth once as needed for Migraine 6/28/18 6/28/18  LEDY Doe CNP      Patient has no known allergies. Review of Systems:  Review of Systems   Constitutional: Positive for chills, fatigue and fever. HENT: Negative for congestion, rhinorrhea and sore throat. Respiratory: Positive for cough, shortness of breath and wheezing. Gastrointestinal: Negative for abdominal pain and nausea. Musculoskeletal: Positive for myalgias (minor). Negative for arthralgias. Neurological: Negative for light-headedness and headaches. Physical Exam:  Physical Exam   Constitutional: He is oriented to person, place, and time. He appears well-developed and well-nourished. HENT:   Head: Normocephalic and atraumatic. Mouth/Throat: Oropharynx is clear and moist.   TM's normal   Eyes: Conjunctivae are normal.   Neck: Neck supple. Cardiovascular: Normal rate and regular rhythm.    Pulmonary/Chest: No respiratory distress. He has wheezes. He has no rales. Lymphadenopathy:     He has no cervical adenopathy. Neurological: He is alert and oriented to person, place, and time. Skin: Skin is warm and dry. Psychiatric: He has a normal mood and affect. His behavior is normal.   Nursing note and vitals reviewed.        /64   Pulse 84   Temp 98 °F (36.7 °C) (Oral)   Wt 211 lb (95.7 kg)   SpO2 96%   BMI 29.43 kg/m²     RECENT LABS:  Lab Results   Component Value Date     05/09/2018    K 5.7 05/09/2018     05/09/2018    CO2 31 05/09/2018    BUN 19 05/09/2018    CREATININE 0.96 05/09/2018    GLUCOSE 145 05/09/2018    PROT 7.4 05/09/2018    LABALBU 4.2 05/09/2018    BILITOT 0.43 05/09/2018    ALKPHOS 80 05/09/2018    AST 21 05/09/2018    ALT 22 05/09/2018     Lab Results   Component Value Date    WBC 8.2 05/09/2018    RBC 4.75 05/09/2018    HGB 14.9 05/09/2018    HCT 44.7 05/09/2018    MCV 94.1 05/09/2018    MCH 31.4 05/09/2018    MCHC 33.3 05/09/2018    RDW 12.6 05/09/2018     05/09/2018    MPV NOT REPORTED 05/09/2018     Lab Results   Component Value Date    TSH 1.20 05/09/2018     Lab Results   Component Value Date    CHOL 157 05/09/2018    CHOL 154.0 09/27/2016    HDL 49 05/09/2018    PSA 0.73 05/04/2015    LABA1C 5.8 04/16/2019       Assessment & Plan:  Bronchitis COPD    Z jerry  Kenalog 40  Aerosols tid qid  Electronically signed by Zarina Carey DO on 5/29/2019 at 2:22 PM

## 2019-05-30 ENCOUNTER — TELEPHONE (OUTPATIENT)
Dept: FAMILY MEDICINE CLINIC | Age: 79
End: 2019-05-30

## 2019-05-30 RX ORDER — BENZONATATE 100 MG/1
100 CAPSULE ORAL 3 TIMES DAILY PRN
Qty: 30 CAPSULE | Refills: 0 | Status: SHIPPED | OUTPATIENT
Start: 2019-05-30 | End: 2019-06-06

## 2019-07-10 DIAGNOSIS — C49.22 LEIOMYOSARCOMA OF LEG, LEFT (HCC): Primary | ICD-10-CM

## 2019-07-11 DIAGNOSIS — C49.22 LEIOMYOSARCOMA OF LEG, LEFT (HCC): Primary | ICD-10-CM

## 2019-08-12 ENCOUNTER — HOSPITAL ENCOUNTER (OUTPATIENT)
Dept: CT IMAGING | Age: 79
Discharge: HOME | End: 2019-08-14
Payer: MEDICARE

## 2019-08-12 ENCOUNTER — TELEPHONE (OUTPATIENT)
Dept: FAMILY MEDICINE CLINIC | Age: 79
End: 2019-08-12

## 2019-08-12 ENCOUNTER — HOSPITAL ENCOUNTER (OUTPATIENT)
Age: 79
Discharge: HOME | End: 2019-08-12
Payer: MEDICARE

## 2019-08-12 DIAGNOSIS — C49.22 LEIOMYOSARCOMA OF LEG, LEFT (HCC): ICD-10-CM

## 2019-08-12 DIAGNOSIS — Z83.49 FAMILY HISTORY OF HEMOCHROMATOSIS: Primary | ICD-10-CM

## 2019-08-12 LAB
BUN BLDV-MCNC: 16 MG/DL (ref 8–23)
CREAT SERPL-MCNC: 0.98 MG/DL (ref 0.7–1.2)
GFR AFRICAN AMERICAN: >60 ML/MIN
GFR NON-AFRICAN AMERICAN: >60 ML/MIN
GFR SERPL CREATININE-BSD FRML MDRD: NORMAL ML/MIN/{1.73_M2}
GFR SERPL CREATININE-BSD FRML MDRD: NORMAL ML/MIN/{1.73_M2}

## 2019-08-12 PROCEDURE — 82565 ASSAY OF CREATININE: CPT

## 2019-08-12 PROCEDURE — 74177 CT ABD & PELVIS W/CONTRAST: CPT

## 2019-08-12 PROCEDURE — 36415 COLL VENOUS BLD VENIPUNCTURE: CPT

## 2019-08-12 PROCEDURE — 84520 ASSAY OF UREA NITROGEN: CPT

## 2019-08-12 PROCEDURE — 6360000004 HC RX CONTRAST MEDICATION: Performed by: INTERNAL MEDICINE

## 2019-08-12 RX ADMIN — IOPAMIDOL 75 ML: 755 INJECTION, SOLUTION INTRAVENOUS at 11:53

## 2019-08-12 RX ADMIN — IOHEXOL 25 ML: 240 INJECTION, SOLUTION INTRATHECAL; INTRAVASCULAR; INTRAVENOUS; ORAL at 11:53

## 2019-08-21 ENCOUNTER — OFFICE VISIT (OUTPATIENT)
Dept: ONCOLOGY | Age: 79
End: 2019-08-21
Payer: MEDICARE

## 2019-08-21 VITALS
HEART RATE: 62 BPM | SYSTOLIC BLOOD PRESSURE: 120 MMHG | WEIGHT: 209 LBS | OXYGEN SATURATION: 95 % | BODY MASS INDEX: 29.15 KG/M2 | DIASTOLIC BLOOD PRESSURE: 60 MMHG

## 2019-08-21 DIAGNOSIS — C49.22 LEIOMYOSARCOMA OF LEG, LEFT (HCC): Primary | ICD-10-CM

## 2019-08-21 PROCEDURE — G8417 CALC BMI ABV UP PARAM F/U: HCPCS | Performed by: INTERNAL MEDICINE

## 2019-08-21 PROCEDURE — 1036F TOBACCO NON-USER: CPT | Performed by: INTERNAL MEDICINE

## 2019-08-21 PROCEDURE — 1123F ACP DISCUSS/DSCN MKR DOCD: CPT | Performed by: INTERNAL MEDICINE

## 2019-08-21 PROCEDURE — G8427 DOCREV CUR MEDS BY ELIG CLIN: HCPCS | Performed by: INTERNAL MEDICINE

## 2019-08-21 PROCEDURE — 4040F PNEUMOC VAC/ADMIN/RCVD: CPT | Performed by: INTERNAL MEDICINE

## 2019-08-21 PROCEDURE — 99214 OFFICE O/P EST MOD 30 MIN: CPT | Performed by: INTERNAL MEDICINE

## 2019-08-21 ASSESSMENT — ENCOUNTER SYMPTOMS
EYE ITCHING: 0
CHEST TIGHTNESS: 0
ABDOMINAL PAIN: 0
CONSTIPATION: 0
TROUBLE SWALLOWING: 0
VOICE CHANGE: 0
COLOR CHANGE: 0
BLOOD IN STOOL: 0
COUGH: 0
SORE THROAT: 0
DIARRHEA: 0
SHORTNESS OF BREATH: 0
EYE REDNESS: 0
WHEEZING: 0
NAUSEA: 0
EYE DISCHARGE: 0
VOMITING: 0

## 2019-08-21 NOTE — LETTER
BUN 16 8 - 23 mg/dL    CREATININE 0.98 0.70 - 1.20 mg/dL    GFR Non-African American >60 >60 mL/min    GFR African American >60 >60 mL/min    GFR Comment          GFR Staging NOT REPORTED        ASSESSMENT:      Diagnosis Orders   1. Leiomyosarcoma of leg, left (HCC)  CBC Auto Differential    Comprehensive Metabolic Panel     Patient has had complete excision of his tumor. Imaging studies do not show any evidence of metastatic disease. I will reevaluate him back again in 8 months. PLAN:      Return in about 8 months (around 4/21/2020) for leiomyosarcoma. Orders Placed This Encounter   Procedures    CBC Auto Differential     Standing Status:   Future     Standing Expiration Date:   8/21/2020    Comprehensive Metabolic Panel     Standing Status:   Future     Standing Expiration Date:   8/21/2020     No orders of the defined types were placed in this encounter. Electronicallysigned by Axel Pickard MD on 8/21/2019 at 3:15 PM      If you have questions, please do not hesitate to call me. I look forward to following Shira Bowles along with you.     Sincerely,        Axel Pickard MD  Phone: [phone]

## 2019-08-21 NOTE — PROGRESS NOTES
NUSHIELD LEFT LOWER LEG performed by Ela Laguna MD at [address] [facility] OFFICE/OUTPT [address] Left 2018    LEG ULCER DEBRIDEMENT APPLICATION OF [place]- LEFT LEG performed by Ela Laguna MD at [address] OFFICE/OUTPT [address] Left 2018    LEG ULCER DEBRIDEMENT APPLICATION OF NUSHIELD-LEFT LEG performed by Ela Laguna MD at [address] [facility] OFFICE/OUTPT [address] Left 2018    LEG DEBRIDEMENT SKIN GRAFT- APPLICATION OF AFFINITY performed by Ela Laguna MD at [address] Left 2017    SKIN GRAFT SPLIT THICKNESS performed by Ela Laguna MD at [facility]         History reviewed. No pertinent family history. Social History     Tobacco Use    Smoking status: Former Smoker     Packs/day: 4.00     Years: 54.00     Pack years: 216.00     Types: Cigarettes     Start date:      Last attempt to quit: 8/15/2003     Years since quittin.0    Smokeless tobacco: Never Used   Substance Use Topics    Alcohol use: Yes     Alcohol/week: 1.0 standard drinks     Types: 1 Cans of beer per week     Comment: occasional          The Past MedicalHistory, Past Surgical History, Past Family History and Past Social History havebeen reviewed      Review of Systems   Constitutional: Negative for activity change, appetite change, chills, fatigue and fever. HENT: Negative for congestion, hearing loss, mouth sores, nosebleeds, sore throat, tinnitus, trouble swallowing and voice change. Eyes: Negative for discharge, redness, itching and visual disturbance. Respiratory: Negative for cough, chest tightness, shortness of breath and wheezing. Cardiovascular: Negative for chest pain and leg swelling. Gastrointestinal: Negative for abdominal pain, blood in stool, constipation, diarrhea, nausea and vomiting. Genitourinary: Negative for decreased urine volume, difficulty urinating, hematuria and urgency.    Musculoskeletal:

## 2019-10-16 ENCOUNTER — OFFICE VISIT (OUTPATIENT)
Dept: FAMILY MEDICINE CLINIC | Age: 79
End: 2019-10-16
Payer: MEDICARE

## 2019-10-16 VITALS
HEART RATE: 60 BPM | WEIGHT: 211 LBS | DIASTOLIC BLOOD PRESSURE: 68 MMHG | BODY MASS INDEX: 29.54 KG/M2 | HEIGHT: 71 IN | SYSTOLIC BLOOD PRESSURE: 130 MMHG | OXYGEN SATURATION: 97 %

## 2019-10-16 DIAGNOSIS — F34.1 DYSTHYMIC DISORDER: ICD-10-CM

## 2019-10-16 DIAGNOSIS — I10 ESSENTIAL HYPERTENSION, BENIGN: Primary | ICD-10-CM

## 2019-10-16 DIAGNOSIS — J43.1 PANLOBULAR EMPHYSEMA (HCC): ICD-10-CM

## 2019-10-16 DIAGNOSIS — Z83.49 FAMILY HISTORY OF HEMOCHROMATOSIS: ICD-10-CM

## 2019-10-16 DIAGNOSIS — E78.00 PURE HYPERCHOLESTEROLEMIA: ICD-10-CM

## 2019-10-16 PROCEDURE — 4040F PNEUMOC VAC/ADMIN/RCVD: CPT | Performed by: FAMILY MEDICINE

## 2019-10-16 PROCEDURE — 1123F ACP DISCUSS/DSCN MKR DOCD: CPT | Performed by: FAMILY MEDICINE

## 2019-10-16 PROCEDURE — 99214 OFFICE O/P EST MOD 30 MIN: CPT | Performed by: FAMILY MEDICINE

## 2019-10-16 PROCEDURE — G8427 DOCREV CUR MEDS BY ELIG CLIN: HCPCS | Performed by: FAMILY MEDICINE

## 2019-10-16 PROCEDURE — 3023F SPIROM DOC REV: CPT | Performed by: FAMILY MEDICINE

## 2019-10-16 PROCEDURE — 1036F TOBACCO NON-USER: CPT | Performed by: FAMILY MEDICINE

## 2019-10-16 PROCEDURE — G8484 FLU IMMUNIZE NO ADMIN: HCPCS | Performed by: FAMILY MEDICINE

## 2019-10-16 PROCEDURE — G8417 CALC BMI ABV UP PARAM F/U: HCPCS | Performed by: FAMILY MEDICINE

## 2019-10-16 PROCEDURE — G8926 SPIRO NO PERF OR DOC: HCPCS | Performed by: FAMILY MEDICINE

## 2019-10-16 RX ORDER — LOVASTATIN 40 MG/1
40 TABLET ORAL NIGHTLY
Qty: 90 TABLET | Refills: 3 | Status: SHIPPED | OUTPATIENT
Start: 2019-10-16 | End: 2020-11-02

## 2019-10-16 RX ORDER — AMLODIPINE BESYLATE 5 MG/1
5 TABLET ORAL 2 TIMES DAILY
Qty: 180 TABLET | Refills: 3 | Status: SHIPPED | OUTPATIENT
Start: 2019-10-16 | End: 2020-08-21

## 2019-10-16 RX ORDER — PROPRANOLOL HYDROCHLORIDE 80 MG/1
80 CAPSULE, EXTENDED RELEASE ORAL DAILY
Qty: 90 CAPSULE | Refills: 3 | Status: SHIPPED | OUTPATIENT
Start: 2019-10-16 | End: 2020-11-06

## 2019-10-16 RX ORDER — LISINOPRIL 30 MG/1
30 TABLET ORAL DAILY
Qty: 90 TABLET | Refills: 3 | Status: SHIPPED | OUTPATIENT
Start: 2019-10-16 | End: 2020-12-10 | Stop reason: SDUPTHER

## 2019-10-16 ASSESSMENT — COPD QUESTIONNAIRES: COPD: 1

## 2019-10-16 ASSESSMENT — ENCOUNTER SYMPTOMS
EYE REDNESS: 0
SHORTNESS OF BREATH: 0
BLURRED VISION: 0
DIARRHEA: 0
COUGH: 0
TROUBLE SWALLOWING: 0
CONSTIPATION: 0
BLOOD IN STOOL: 0
VOMITING: 0
ABDOMINAL PAIN: 0
NAUSEA: 0
EYE DISCHARGE: 0
SORE THROAT: 0

## 2019-10-30 ENCOUNTER — HOSPITAL ENCOUNTER (OUTPATIENT)
Age: 79
Discharge: HOME | End: 2019-10-30
Payer: MEDICARE

## 2019-10-30 DIAGNOSIS — E78.00 PURE HYPERCHOLESTEROLEMIA: ICD-10-CM

## 2019-10-30 DIAGNOSIS — Z83.49 FAMILY HISTORY OF HEMOCHROMATOSIS: ICD-10-CM

## 2019-10-30 DIAGNOSIS — I10 ESSENTIAL HYPERTENSION, BENIGN: ICD-10-CM

## 2019-10-30 LAB
ALBUMIN SERPL-MCNC: 4.6 G/DL (ref 3.5–5.2)
ALBUMIN/GLOBULIN RATIO: ABNORMAL (ref 1–2.5)
ALP BLD-CCNC: 73 U/L (ref 40–129)
ALT SERPL-CCNC: 18 U/L (ref 5–41)
ANION GAP SERPL CALCULATED.3IONS-SCNC: 10 MMOL/L (ref 9–17)
AST SERPL-CCNC: 18 U/L
BILIRUB SERPL-MCNC: 0.44 MG/DL (ref 0.3–1.2)
BUN BLDV-MCNC: 22 MG/DL (ref 8–23)
BUN/CREAT BLD: 22 (ref 9–20)
CALCIUM SERPL-MCNC: 9.9 MG/DL (ref 8.6–10.4)
CHLORIDE BLD-SCNC: 103 MMOL/L (ref 98–107)
CHOLESTEROL/HDL RATIO: 4.1
CHOLESTEROL: 223 MG/DL
CO2: 27 MMOL/L (ref 20–31)
CREAT SERPL-MCNC: 1.01 MG/DL (ref 0.7–1.2)
GFR AFRICAN AMERICAN: >60 ML/MIN
GFR NON-AFRICAN AMERICAN: >60 ML/MIN
GFR SERPL CREATININE-BSD FRML MDRD: ABNORMAL ML/MIN/{1.73_M2}
GFR SERPL CREATININE-BSD FRML MDRD: ABNORMAL ML/MIN/{1.73_M2}
GLUCOSE BLD-MCNC: 143 MG/DL (ref 70–99)
HDLC SERPL-MCNC: 54 MG/DL
LDL CHOLESTEROL: 138 MG/DL (ref 0–130)
PATIENT FASTING?: YES
POTASSIUM SERPL-SCNC: 4.8 MMOL/L (ref 3.7–5.3)
SODIUM BLD-SCNC: 140 MMOL/L (ref 135–144)
TOTAL PROTEIN: 8.1 G/DL (ref 6.4–8.3)
TRIGL SERPL-MCNC: 157 MG/DL
VLDLC SERPL CALC-MCNC: ABNORMAL MG/DL (ref 1–30)

## 2019-10-30 PROCEDURE — 80053 COMPREHEN METABOLIC PANEL: CPT

## 2019-10-30 PROCEDURE — 81256 HFE GENE: CPT

## 2019-10-30 PROCEDURE — 36415 COLL VENOUS BLD VENIPUNCTURE: CPT

## 2019-10-30 PROCEDURE — 80061 LIPID PANEL: CPT

## 2019-11-01 LAB
C282Y HEMOCHROMATOSIS MUT: NEGATIVE
H63D HEMOCHROMATOSIS MUT: NORMAL
HEMOCHROMATOSIS MUTATION INT: NORMAL
HEMOCHROMATOSIS SPECIMEN: NORMAL
S65C HEMOCHROMATOSIS MUT: NEGATIVE

## 2020-06-03 ENCOUNTER — OFFICE VISIT (OUTPATIENT)
Dept: ONCOLOGY | Age: 80
End: 2020-06-03
Payer: MEDICARE

## 2020-06-03 VITALS
DIASTOLIC BLOOD PRESSURE: 79 MMHG | HEART RATE: 60 BPM | BODY MASS INDEX: 29.01 KG/M2 | TEMPERATURE: 97.9 F | WEIGHT: 208 LBS | SYSTOLIC BLOOD PRESSURE: 146 MMHG

## 2020-06-03 PROCEDURE — 4040F PNEUMOC VAC/ADMIN/RCVD: CPT | Performed by: INTERNAL MEDICINE

## 2020-06-03 PROCEDURE — G8417 CALC BMI ABV UP PARAM F/U: HCPCS | Performed by: INTERNAL MEDICINE

## 2020-06-03 PROCEDURE — 99214 OFFICE O/P EST MOD 30 MIN: CPT | Performed by: INTERNAL MEDICINE

## 2020-06-03 PROCEDURE — 1036F TOBACCO NON-USER: CPT | Performed by: INTERNAL MEDICINE

## 2020-06-03 PROCEDURE — 1123F ACP DISCUSS/DSCN MKR DOCD: CPT | Performed by: INTERNAL MEDICINE

## 2020-06-03 PROCEDURE — G8428 CUR MEDS NOT DOCUMENT: HCPCS | Performed by: INTERNAL MEDICINE

## 2020-06-03 PROCEDURE — 99211 OFF/OP EST MAY X REQ PHY/QHP: CPT

## 2020-06-03 NOTE — PROGRESS NOTES
DIAGNOSIS:   1. Leiomyosarcoma of the left leg  CURRENT THERAPY:  Status post wide excision in 2018  BRIEF CASE HISTORY:   Akbar Bradford is a very pleasant [de-identified] y.o. male who was followed by Dr. Ruchi Pratt because of leiomyosarcoma of the left leg. He had a nodule  on the left shin since childhood but in 2018 started getting bigger. He had that excised and showed leiomyosarcoma low-grade. He had a complete resection and required multiple grafting to make sure it is completely closed. He was referred to oncology and was followed conservatively. INTERIM HISTORY:  The patient comes in today for a follow up, he is doing well without any symptoms. Specifically he denies any bleeding or bruising. The area is completely covered by skin and no ulcers. He has chronic fatigue and some shortness of breath but likely related to COPD. PAST MEDICAL HISTORY: has a past medical history of Cancer ([facility] [address] COPD (chronic obstructive pulmonary disease) ([facility] [facility] Hyperlipidemia, Hypertension, and Migraine. PAST SURGICAL HISTORY: has a past surgical history that includes Tonsillectomy; hernia repair; Hemorrhoid surgery; EXCISION / BIOPSY SKIN LESION OF LEG (Left, 11/13/2017); Skin graft (Left, 11/22/2017); EXCISION / BIOPSY SKIN LESION OF LEG (11/22/2017); pr office/outpt visit,procedure only (Left, 1/24/2018); pr office/outpt visit,procedure only (Left, 2/1/2018); pr office/outpt visit,procedure only (Left, 2/12/2018); pr office/outpt visit,procedure only (Left, 2/26/2018); pr office/outpt visit,procedure only (Left, 3/12/2018); pr office/outpt visit,procedure only (Left, 3/19/2018); pr office/outpt visit,procedure only (Left, 3/26/2018); pr office/outpt visit,procedure only (Left, 4/2/2018); pr office/outpt visit,procedure only (Left, 4/11/2018); and pr office/outpt visit,procedure only (Left, 4/18/2018).      CURRENT MEDICATIONS:  has a current medication list which includes the following prescription(s): propranolol, sertraline, bilaterally. Heart: Regular without any murmur. Abdomen: Soft, nontender. No hepatosplenomegaly. Extremities: Lower extremities show no edema, clubbing, or cyanosis. He has a well-healed skin graft in the left shin. No evidence of recurrence breasts: Examination not done today. Neuro exam: intact cranial nerves bilaterally no motor or sensory deficit, gait is normal. Lymphatic: no adenopathy appreciated in the supraclavicular, axillary, cervical or inguinal area    Review of radiological studies:     Review of laboratory data:     IMPRESSION:   1. History of leiomyosarcoma of the left lower extremity, completely removed  2. COPD  3. In remission, surveillance  Plan:   I had a long discussion with the patient, he was reassured that he is doing well without any evidence of recurrence. We will continue observation. Because of his shortness of breath we will continue to periodically image his lungs.

## 2020-06-09 ENCOUNTER — OFFICE VISIT (OUTPATIENT)
Dept: FAMILY MEDICINE CLINIC | Age: 80
End: 2020-06-09
Payer: MEDICARE

## 2020-06-09 VITALS
OXYGEN SATURATION: 96 % | BODY MASS INDEX: 29.01 KG/M2 | SYSTOLIC BLOOD PRESSURE: 138 MMHG | HEART RATE: 54 BPM | WEIGHT: 208 LBS | DIASTOLIC BLOOD PRESSURE: 70 MMHG

## 2020-06-09 PROCEDURE — 99214 OFFICE O/P EST MOD 30 MIN: CPT | Performed by: FAMILY MEDICINE

## 2020-06-09 PROCEDURE — 3023F SPIROM DOC REV: CPT | Performed by: FAMILY MEDICINE

## 2020-06-09 PROCEDURE — G8926 SPIRO NO PERF OR DOC: HCPCS | Performed by: FAMILY MEDICINE

## 2020-06-09 PROCEDURE — 4040F PNEUMOC VAC/ADMIN/RCVD: CPT | Performed by: FAMILY MEDICINE

## 2020-06-09 PROCEDURE — G8417 CALC BMI ABV UP PARAM F/U: HCPCS | Performed by: FAMILY MEDICINE

## 2020-06-09 PROCEDURE — 3288F FALL RISK ASSESSMENT DOCD: CPT | Performed by: FAMILY MEDICINE

## 2020-06-09 PROCEDURE — G8427 DOCREV CUR MEDS BY ELIG CLIN: HCPCS | Performed by: FAMILY MEDICINE

## 2020-06-09 PROCEDURE — 1036F TOBACCO NON-USER: CPT | Performed by: FAMILY MEDICINE

## 2020-06-09 PROCEDURE — 1123F ACP DISCUSS/DSCN MKR DOCD: CPT | Performed by: FAMILY MEDICINE

## 2020-06-09 ASSESSMENT — ENCOUNTER SYMPTOMS
EYE DISCHARGE: 0
VOMITING: 0
DIARRHEA: 0
SORE THROAT: 0
RHINORRHEA: 0
SHORTNESS OF BREATH: 0
EYE REDNESS: 0
NAUSEA: 0
CONSTIPATION: 0
COUGH: 0
TROUBLE SWALLOWING: 0
ABDOMINAL PAIN: 0
BLOOD IN STOOL: 0
FACIAL SWELLING: 0

## 2020-06-09 ASSESSMENT — COPD QUESTIONNAIRES: COPD: 1

## 2020-08-21 RX ORDER — AMLODIPINE BESYLATE 5 MG/1
TABLET ORAL
Qty: 180 TABLET | Refills: 0 | Status: SHIPPED | OUTPATIENT
Start: 2020-08-21 | End: 2020-12-10 | Stop reason: SDUPTHER

## 2020-08-21 NOTE — TELEPHONE ENCOUNTER
Last OV 6/2020 for HTN  Requesting refill on amlodipine thru [facility] script  Next OV 12/10/20 I will STOP taking the medications listed below when I get home from the hospital:  None

## 2020-11-02 RX ORDER — LOVASTATIN 40 MG/1
40 TABLET ORAL NIGHTLY
Qty: 90 TABLET | Refills: 0 | Status: SHIPPED | OUTPATIENT
Start: 2020-11-02 | End: 2020-12-10 | Stop reason: SDUPTHER

## 2020-11-06 RX ORDER — PROPRANOLOL HYDROCHLORIDE 80 MG/1
CAPSULE, EXTENDED RELEASE ORAL
Qty: 90 CAPSULE | Refills: 0 | Status: SHIPPED | OUTPATIENT
Start: 2020-11-06 | End: 2020-12-10 | Stop reason: SDUPTHER

## 2020-12-10 ENCOUNTER — VIRTUAL VISIT (OUTPATIENT)
Dept: FAMILY MEDICINE CLINIC | Age: 80
End: 2020-12-10
Payer: MEDICARE

## 2020-12-10 PROBLEM — L97.922 NON-PRESSURE CHRONIC ULCER OF UNSPECIFIED PART OF LEFT LOWER LEG WITH FAT LAYER EXPOSED (HCC): Status: RESOLVED | Noted: 2020-12-10 | Resolved: 2020-12-10

## 2020-12-10 PROBLEM — L97.922 NON-PRESSURE CHRONIC ULCER OF UNSPECIFIED PART OF LEFT LOWER LEG WITH FAT LAYER EXPOSED (HCC): Status: ACTIVE | Noted: 2020-12-10

## 2020-12-10 PROCEDURE — 99442 PR PHYS/QHP TELEPHONE EVALUATION 11-20 MIN: CPT | Performed by: FAMILY MEDICINE

## 2020-12-10 RX ORDER — PROPRANOLOL HYDROCHLORIDE 80 MG/1
80 CAPSULE, EXTENDED RELEASE ORAL DAILY
Qty: 90 CAPSULE | Refills: 1 | Status: SHIPPED | OUTPATIENT
Start: 2020-12-10 | End: 2021-08-16

## 2020-12-10 RX ORDER — AMLODIPINE BESYLATE 5 MG/1
5 TABLET ORAL 2 TIMES DAILY
Qty: 180 TABLET | Refills: 1 | Status: SHIPPED | OUTPATIENT
Start: 2020-12-10 | End: 2021-09-14 | Stop reason: SDUPTHER

## 2020-12-10 RX ORDER — LISINOPRIL 30 MG/1
30 TABLET ORAL DAILY
Qty: 90 TABLET | Refills: 1 | Status: SHIPPED | OUTPATIENT
Start: 2020-12-10 | End: 2021-07-13

## 2020-12-10 RX ORDER — LOVASTATIN 40 MG/1
40 TABLET ORAL NIGHTLY
Qty: 90 TABLET | Refills: 1 | Status: SHIPPED | OUTPATIENT
Start: 2020-12-10 | End: 2021-08-16

## 2020-12-10 ASSESSMENT — ENCOUNTER SYMPTOMS
DIARRHEA: 0
SORE THROAT: 0
EYE DISCHARGE: 0
ABDOMINAL PAIN: 0
NAUSEA: 0
COUGH: 0
SHORTNESS OF BREATH: 0
BLOOD IN STOOL: 0
EYE REDNESS: 0
VOMITING: 0
CONSTIPATION: 0

## 2020-12-10 ASSESSMENT — COPD QUESTIONNAIRES: COPD: 1

## 2020-12-10 NOTE — PROGRESS NOTES
Taz Neely is a [de-identified] y.o. male evaluated via telephone on 12/10/2020. Consent:  He and/or health care decision maker is aware that that he may receive a bill for this telephone service, depending on his insurance coverage, and has provided verbal consent to proceed: Yes      Documentation:  I communicated with the patient and/or health care decision maker about 6mos ck up. Details of this discussion including any medical advice provided: see note below      I affirm this is a Patient Initiated Episode with a Patient who has not had a related appointment within my department in the past 7 days or scheduled within the next 24 hours. Patient identification was verified at the start of the visit: Yes    Total Time: minutes: 11-20 minutes    Note: not billable if this call serves to triage the patient into an appointment for the relevant concern      Xander MARMOLEJO Notes    Name: Taz Neely  : 1940        Chief Complaint:     Chief Complaint   Patient presents with    Hypertension    Hyperlipidemia    COPD       History of Present Illness:     Taz Neely is a [de-identified] y.o.  male who presents with Hypertension; Hyperlipidemia; and COPD      Hypertension   This is a chronic problem. The current episode started more than 1 year ago. The problem is unchanged. The problem is controlled. Pertinent negatives include no chest pain, headaches, palpitations or shortness of breath. Risk factors for coronary artery disease include male gender. There is no history of a thyroid problem. Hyperlipidemia   This is a chronic problem. The current episode started more than 1 year ago. The problem is controlled. Recent lipid tests were reviewed and are normal. He has no history of diabetes. Pertinent negatives include no chest pain or shortness of breath. Current antihyperlipidemic treatment includes statins. The current treatment provides significant improvement of lipids.  Risk factors for coronary artery disease include dyslipidemia, hypertension and male sex. COPD   There is no cough or shortness of breath. This is a chronic problem. The current episode started more than 1 year ago. The problem has been unchanged. Pertinent negatives include no chest pain, fever, headaches or sore throat. Relieved by: pt not using any inhalers including the spiriva. His past medical history is significant for COPD. Dysthymia - chronic but stable on zoloft for years. Pt has no new concerns.   Past Medical History:     Past Medical History:   Diagnosis Date    Cancer ([facility] [address]     left lower leg    COPD (chronic obstructive pulmonary disease) ([facility]     Hyperlipidemia     Hypertension     Migraine       Reviewed all health maintenance requirements and ordered appropriate tests  Health Maintenance Due   Topic Date Due    Shingles Vaccine (1 of 2) 05/01/1990    Annual Wellness Visit (AWV)  05/29/2019    Lipid screen  10/30/2020    Potassium monitoring  10/30/2020    Creatinine monitoring  10/30/2020       Past Surgical History:     Past Surgical History:   Procedure Laterality Date    EXCISION / BIOPSY SKIN LESION OF LEG Left 11/13/2017    LEG LESION BIOPSY EXCISION-EXCISION LEFT LEG LESION performed by Shannan Stubbs MD at [facility] [address] OF LEG  11/22/2017    LEG LESION BIOPSY EXCISION performed by Shannan Stubbs MD at [address] [place] [address] OFFICE/OUTPT VISIT,PROCEDURE ONLY Left 1/24/2018    LEG DEBRIDEMENT SKIN GRAFT--LEFT LEG DEBRIDEMENT WITH APLIGRAFT APPLICATION [facility] [address] [facility] [address] Left 2/1/2018    LEG ULCER DEBRIDEMENT APPLICATION OF APLIGRAF performed by Shannan Stubbs MD at [address] [address] OFFICE/OUTPT [address] [facility] [address] Left 2/12/2018    LEG ULCER DEBRIDEMENT APPLICATION OF APLIGRAF----Application Apligraf ,Debridement Left Lower Leg Wound performed by Shannan Stubbs MD Take 3 mLs by nebulization every 6 hours as needed for Wheezing  Patient not taking: Reported on 6/3/2020 5/29/19   Francia Whiteap A Jump, DO   Multiple Vitamins-Minerals (EQ VISION FORMULA 50+) CAPS Take by mouth daily    Historical Provider, MD   tiotropium (SPIRIVA HANDIHALER) 18 MCG inhalation capsule Inhale 1 capsule into the lungs daily 4/16/19   Trisha Mccoy MD   RaNITidine HCl (RANITIDINE 150 MAX STRENGTH PO) Take by mouth daily as needed    Historical Provider, MD   SUMAtriptan (IMITREX) 25 MG tablet Take 1 tablet by mouth once as needed for Migraine  Patient not taking: Reported on 6/3/2020 6/28/18 6/3/20  Lauren Lopez, APRN - CNP   Multiple Vitamin (MULTI VITAMIN DAILY PO) Take by mouth daily    Historical Provider, MD   Docusate Calcium (STOOL SOFTENER PO) Take  by mouth daily. Historical Provider, MD        Allergies:       Patient has no known allergies. Social History:     Tobacco:    reports that he quit smoking about 17 years ago. His smoking use included cigarettes. He started smoking about 61 years ago. He has a 216.00 pack-year smoking history. He has never used smokeless tobacco.  Alcohol:      reports current alcohol use of about 1.0 standard drinks of alcohol per week. Drug Use:  reports no history of drug use. Family History:     History reviewed. No pertinent family history. Review of Systems:       Review of Systems   Constitutional: Negative for chills, fatigue and fever. HENT: Negative for congestion and sore throat. Eyes: Negative for discharge and redness. Respiratory: Negative for cough and shortness of breath. Cardiovascular: Negative for chest pain and palpitations. Gastrointestinal: Negative for abdominal pain, blood in stool, constipation, diarrhea, nausea and vomiting. Genitourinary: Negative for dysuria and hematuria. Musculoskeletal: Negative for joint swelling and neck stiffness. Skin: Negative for pallor and rash.    Neurological: Negative for dizziness, tremors, light-headedness and headaches. Psychiatric/Behavioral: Negative for confusion and sleep disturbance. Physical Exam:     Physical Exam  Vitals signs reviewed. Constitutional:       General: He is not in acute distress. Appearance: He is not ill-appearing. Neurological:      Mental Status: He is alert. Vitals: There were no vitals taken for this visit. Data:     Lab Results   Component Value Date     10/30/2019    K 4.8 10/30/2019     10/30/2019    CO2 27 10/30/2019    BUN 22 10/30/2019    CREATININE 1.01 10/30/2019    GLUCOSE 143 10/30/2019    PROT 8.1 10/30/2019    LABALBU 4.6 10/30/2019    BILITOT 0.44 10/30/2019    ALKPHOS 73 10/30/2019    AST 18 10/30/2019    ALT 18 10/30/2019     Lab Results   Component Value Date    WBC 8.2 05/09/2018    RBC 4.75 05/09/2018    HGB 14.9 05/09/2018    HCT 44.7 05/09/2018    MCV 94.1 05/09/2018    MCH 31.4 05/09/2018    MCHC 33.3 05/09/2018    RDW 12.6 05/09/2018     05/09/2018    MPV NOT REPORTED 05/09/2018     Lab Results   Component Value Date    TSH 1.20 05/09/2018     Lab Results   Component Value Date    CHOL 223 10/30/2019    CHOL 154.0 09/27/2016    HDL 54 10/30/2019    PSA 0.73 05/04/2015    LABA1C 5.8 04/16/2019          Assessment/Plan:        1. Essential hypertension, benign  Stable on inderal, norvasc and zestril  - propranolol (INDERAL LA) 80 MG extended release capsule; Take 1 capsule by mouth daily  Dispense: 90 capsule; Refill: 1  - amLODIPine (NORVASC) 5 MG tablet; Take 1 tablet by mouth 2 times daily  Dispense: 180 tablet; Refill: 1  - lisinopril (ZESTRIL) 30 MG tablet; Take 1 tablet by mouth daily  Dispense: 90 tablet; Refill: 1    2. Pure hypercholesterolemia  Stable on mevacor  - lovastatin (MEVACOR) 40 MG tablet; Take 1 tablet by mouth nightly  Dispense: 90 tablet; Refill: 1    3. Panlobular emphysema (HCC)  Stable and no inhalers used     4.  Dysthymic disorder  Stable on zoloft  - Depression Severity: 1-4 = Minimal depression, 5-9 = Mild depression, 10-14 = Moderate depression, 15-19 = Moderately severe depression, 20-27 = Severe depression        Return in about 6 months (around 6/10/2021) for HTN, Hyperlipidemia, COPD.       Electronically signed by Renata Augustin MD on 12/10/2020 at 11:37 PM

## 2020-12-29 ENCOUNTER — PATIENT MESSAGE (OUTPATIENT)
Dept: FAMILY MEDICINE CLINIC | Age: 80
End: 2020-12-29

## 2020-12-29 NOTE — TELEPHONE ENCOUNTER
From: [facility]  To: Reggie Waldron MD  Sent: 12/29/2020 1:36 PM EST  Subject: Non-Urgent Medical Question    Hi, Just wondering about covide vaccine shot when or where i can get it , thanks [employer]

## 2021-01-18 ENCOUNTER — PATIENT MESSAGE (OUTPATIENT)
Dept: FAMILY MEDICINE CLINIC | Age: 81
End: 2021-01-18

## 2021-01-18 NOTE — TELEPHONE ENCOUNTER
From: Aida Cleveland  To: Tash Espinoza MD  Sent: 1/18/2021 9:34 AM EST  Subject: Non-Urgent Medical Question    Hi , i was wondering if your giving covid shots yet , i'm in the [de-identified] yr old group, or do i need to get it in [address] [place] [address] , Thanks      ----- Message -----   From:BENITO LEGGETT   Sent:12/29/2020 2:31 PM EST   To:Lazaro Cleveland   Subject:RE: Non-Urgent Medical Question    Araceli Dejan,  We don't have much information for the Covid shot yet. We do have a list going that I will add your name to so when we get the shot in stock we will let you know.   Nataliia Shirley      ----- Message -----   From:Lazaro Cleveland   Sent:12/29/2020 1:36 PM EST   To:Sandra Blair MD   Subject:Non-Urgent Medical Question    Hi, Just wondering about covide vaccine shot when or where i can get it , thanks [email or web address]

## 2021-02-23 ENCOUNTER — HOSPITAL ENCOUNTER (OUTPATIENT)
Dept: LAB | Age: 81
Discharge: HOME | End: 2021-02-23
Payer: MEDICARE

## 2021-02-23 ENCOUNTER — HOSPITAL ENCOUNTER (OUTPATIENT)
Dept: CT IMAGING | Age: 81
Discharge: HOME | End: 2021-02-25
Payer: MEDICARE

## 2021-02-23 DIAGNOSIS — C49.22 LEIOMYOSARCOMA OF LEG, LEFT (HCC): ICD-10-CM

## 2021-02-23 LAB
ABSOLUTE EOS #: 0.26 K/UL (ref 0–0.44)
ABSOLUTE IMMATURE GRANULOCYTE: <0.03 K/UL (ref 0–0.3)
ABSOLUTE LYMPH #: 2.49 K/UL (ref 1.1–3.7)
ABSOLUTE MONO #: 0.99 K/UL (ref 0.1–1.2)
ALBUMIN SERPL-MCNC: 4.5 G/DL (ref 3.5–5.2)
ALBUMIN/GLOBULIN RATIO: 1.5 (ref 1–2.5)
ALP BLD-CCNC: 73 U/L (ref 40–129)
ALT SERPL-CCNC: 24 U/L (ref 5–41)
ANION GAP SERPL CALCULATED.3IONS-SCNC: 7 MMOL/L (ref 9–17)
AST SERPL-CCNC: 24 U/L
BASOPHILS # BLD: 1 % (ref 0–2)
BASOPHILS ABSOLUTE: 0.07 K/UL (ref 0–0.2)
BILIRUB SERPL-MCNC: 0.41 MG/DL (ref 0.3–1.2)
BUN BLDV-MCNC: 14 MG/DL (ref 8–23)
BUN/CREAT BLD: 16 (ref 9–20)
CALCIUM SERPL-MCNC: 9.5 MG/DL (ref 8.6–10.4)
CHLORIDE BLD-SCNC: 103 MMOL/L (ref 98–107)
CO2: 30 MMOL/L (ref 20–31)
CREAT SERPL-MCNC: 0.86 MG/DL (ref 0.7–1.2)
DIFFERENTIAL TYPE: NORMAL
EOSINOPHILS RELATIVE PERCENT: 3 % (ref 1–4)
GFR AFRICAN AMERICAN: >60 ML/MIN
GFR NON-AFRICAN AMERICAN: >60 ML/MIN
GFR SERPL CREATININE-BSD FRML MDRD: ABNORMAL ML/MIN/{1.73_M2}
GFR SERPL CREATININE-BSD FRML MDRD: ABNORMAL ML/MIN/{1.73_M2}
GLUCOSE BLD-MCNC: 87 MG/DL (ref 70–99)
HCT VFR BLD CALC: 47.4 % (ref 40.7–50.3)
HEMOGLOBIN: 15.2 G/DL (ref 13–17)
IMMATURE GRANULOCYTES: 0 %
LYMPHOCYTES # BLD: 30 % (ref 24–43)
MCH RBC QN AUTO: 31.1 PG (ref 25.2–33.5)
MCHC RBC AUTO-ENTMCNC: 32.1 G/DL (ref 28.4–34.8)
MCV RBC AUTO: 97.1 FL (ref 82.6–102.9)
MONOCYTES # BLD: 12 % (ref 3–12)
NRBC AUTOMATED: 0 PER 100 WBC
PDW BLD-RTO: 11.9 % (ref 11.8–14.4)
PLATELET # BLD: 238 K/UL (ref 138–453)
PLATELET ESTIMATE: NORMAL
PMV BLD AUTO: 10.8 FL (ref 8.1–13.5)
POTASSIUM SERPL-SCNC: 4.6 MMOL/L (ref 3.7–5.3)
RBC # BLD: 4.88 M/UL (ref 4.21–5.77)
RBC # BLD: NORMAL 10*6/UL
SEG NEUTROPHILS: 54 % (ref 36–65)
SEGMENTED NEUTROPHILS ABSOLUTE COUNT: 4.52 K/UL (ref 1.5–8.1)
SODIUM BLD-SCNC: 140 MMOL/L (ref 135–144)
TOTAL PROTEIN: 7.6 G/DL (ref 6.4–8.3)
WBC # BLD: 8.4 K/UL (ref 3.5–11.3)
WBC # BLD: NORMAL 10*3/UL

## 2021-02-23 PROCEDURE — 36415 COLL VENOUS BLD VENIPUNCTURE: CPT

## 2021-02-23 PROCEDURE — 85025 COMPLETE CBC W/AUTO DIFF WBC: CPT

## 2021-02-23 PROCEDURE — 6360000004 HC RX CONTRAST MEDICATION: Performed by: INTERNAL MEDICINE

## 2021-02-23 PROCEDURE — 71260 CT THORAX DX C+: CPT

## 2021-02-23 PROCEDURE — 80053 COMPREHEN METABOLIC PANEL: CPT

## 2021-02-23 RX ADMIN — IOPAMIDOL 75 ML: 755 INJECTION, SOLUTION INTRAVENOUS at 13:50

## 2021-03-03 ENCOUNTER — OFFICE VISIT (OUTPATIENT)
Dept: ONCOLOGY | Age: 81
End: 2021-03-03
Payer: MEDICARE

## 2021-03-03 VITALS
RESPIRATION RATE: 18 BRPM | HEIGHT: 71 IN | SYSTOLIC BLOOD PRESSURE: 144 MMHG | HEART RATE: 64 BPM | WEIGHT: 212.9 LBS | BODY MASS INDEX: 29.81 KG/M2 | TEMPERATURE: 98 F | DIASTOLIC BLOOD PRESSURE: 75 MMHG

## 2021-03-03 DIAGNOSIS — C49.22 LEIOMYOSARCOMA OF LEG, LEFT (HCC): Primary | ICD-10-CM

## 2021-03-03 PROCEDURE — 1036F TOBACCO NON-USER: CPT | Performed by: INTERNAL MEDICINE

## 2021-03-03 PROCEDURE — G8427 DOCREV CUR MEDS BY ELIG CLIN: HCPCS | Performed by: INTERNAL MEDICINE

## 2021-03-03 PROCEDURE — 99211 OFF/OP EST MAY X REQ PHY/QHP: CPT

## 2021-03-03 PROCEDURE — G8482 FLU IMMUNIZE ORDER/ADMIN: HCPCS | Performed by: INTERNAL MEDICINE

## 2021-03-03 PROCEDURE — 4040F PNEUMOC VAC/ADMIN/RCVD: CPT | Performed by: INTERNAL MEDICINE

## 2021-03-03 PROCEDURE — 1123F ACP DISCUSS/DSCN MKR DOCD: CPT | Performed by: INTERNAL MEDICINE

## 2021-03-03 PROCEDURE — G8417 CALC BMI ABV UP PARAM F/U: HCPCS | Performed by: INTERNAL MEDICINE

## 2021-03-03 PROCEDURE — 99214 OFFICE O/P EST MOD 30 MIN: CPT | Performed by: INTERNAL MEDICINE

## 2021-03-03 NOTE — PROGRESS NOTES
PAST SURGICAL HISTORY: has a past surgical history that includes Tonsillectomy; hernia repair; Hemorrhoid surgery; EXCISION / BIOPSY SKIN LESION OF LEG (Left, 11/13/2017); Skin graft (Left, 11/22/2017); EXCISION / BIOPSY SKIN LESION OF LEG (11/22/2017); pr office/outpt visit,procedure only (Left, 1/24/2018); pr office/outpt visit,procedure only (Left, 2/1/2018); pr office/outpt visit,procedure only (Left, 2/12/2018); pr office/outpt visit,procedure only (Left, 2/26/2018); pr office/outpt visit,procedure only (Left, 3/12/2018); pr office/outpt visit,procedure only (Left, 3/19/2018); pr office/outpt visit,procedure only (Left, 3/26/2018); pr office/outpt visit,procedure only (Left, 4/2/2018); pr office/outpt visit,procedure only (Left, 4/11/2018); and pr office/outpt visit,procedure only (Left, 4/18/2018). CURRENT MEDICATIONS:  has a current medication list which includes the following prescription(s): propranolol, lovastatin, amlodipine, sertraline, lisinopril, eq vision formula 50+, multiple vitamin, docusate calcium, albuterol, tiotropium, ranitidine hcl, and sumatriptan, and the following Facility-Administered Medications: lidocaine-epinephrine. ALLERGIES:  has No Known Allergies. FAMILY HISTORY: Negative for any hematological or oncological conditions. SOCIAL HISTORY:  reports that he quit smoking about 17 years ago. His smoking use included cigarettes. He started smoking about 62 years ago. He has a 216.00 pack-year smoking history. He has never used smokeless tobacco. He reports current alcohol use of about 1.0 standard drinks of alcohol per week. He reports that he does not use drugs. REVIEW OF SYSTEMS:  General: no fever or night sweats, Weight is stable. ENT: No double or blurred vision, no tinnitus or hearing problem, no dysphagia or sore throat   Respiratory: No chest pain,+ shortness of breath, no cough or hemoptysis. Cardiovascular: Denies chest pain, PND or orthopnea. No L E swelling or palpitations. Gastrointestinal:    No nausea or vomiting, abdominal pain, diarrhea or constipation. Genitourinary: Denies dysuria, hematuria, frequency, urgency or incontinence. Neurological: Denies headaches, decreased LOC, no sensory or motor focal deficits. Musculoskeletal:  No arthralgia no back pain or joint swelling. Skin: There are no rashes or bleeding. Psychiatric:  No anxiety, no depression. Endocrine: no diabetes or thyroid disease. Hematologic: no bleeding , no adenopathy. PHYSICAL EXAM: Shows a well appearing [de-identified]y.o.-year-old male who is not in pain or distress. Vital Signs: Blood pressure (!) 144/75, pulse 64, temperature 98 °F (36.7 °C), temperature source Temporal, resp. rate 18, height 5' 11\" (1.803 m), weight 212 lb 14.4 oz (96.6 kg). HEENT: Normocephalic and atraumatic. Pupils are equal, round, reactive to light and accommodation. Extraocular muscles are intact. Neck: Showed no JVD, no carotid bruit . Lungs: Clear to auscultation bilaterally. Heart: Regular without any murmur. Abdomen: Soft, nontender. No hepatosplenomegaly. Extremities: Lower extremities show no edema, clubbing, or cyanosis. He has a well-healed skin graft in the left shin. No evidence of recurrence breasts: Examination not done today. Neuro exam: intact cranial nerves bilaterally no motor or sensory deficit, gait is normal. Lymphatic: no adenopathy appreciated in the supraclavicular, axillary, cervical or inguinal area    Review of radiological studies:     Review of laboratory data:     IMPRESSION:   1. History of leiomyosarcoma of the left lower extremity, completely removed  2. COPD  3. In remission, surveillance  Plan:   Patient continues to do well with no evidence of recurrence. I reviewed the labs and CT scan with the patient and his wife. There is no evidence of relapse or any abnormalities. We again discussed the nature of this cancer and management plans. Continue surveillance. We will see him in 1 year with repeated chest CT scan and labs. Sooner for any problems. Patient's questions were answered to the best of his satisfaction and he verbalized full understanding and agreement. 6 [facility]                            This note is created with the assistance of a speech recognition program.  While intending to generate a document that actually reflects the content of the visit, the document can still have some errors including those of syntax and sound a like substitutions which may escape proof reading. It such instances, actual meaning can be extrapolated by contextual diversion.

## 2021-03-08 DIAGNOSIS — E78.00 PURE HYPERCHOLESTEROLEMIA: ICD-10-CM

## 2021-03-08 DIAGNOSIS — C49.22 LEIOMYOSARCOMA OF LEG, LEFT (HCC): ICD-10-CM

## 2021-03-08 DIAGNOSIS — I10 ESSENTIAL HYPERTENSION, BENIGN: ICD-10-CM

## 2021-03-08 LAB
ALBUMIN SERPL-MCNC: 4.2 G/DL
ALP BLD-CCNC: 64 U/L
ALT SERPL-CCNC: 21 U/L
ANION GAP SERPL CALCULATED.3IONS-SCNC: 14 MMOL/L
AST SERPL-CCNC: 23 U/L
BILIRUB SERPL-MCNC: 0.7 MG/DL (ref 0.1–1.4)
BUN BLDV-MCNC: 21 MG/DL
CALCIUM SERPL-MCNC: 9.1 MG/DL
CHLORIDE BLD-SCNC: 101 MMOL/L
CHOLESTEROL, TOTAL: 142 MG/DL
CHOLESTEROL/HDL RATIO: 3
CO2: 29 MMOL/L
CREAT SERPL-MCNC: 1.16 MG/DL
GFR CALCULATED: NORMAL
GLUCOSE BLD-MCNC: 127 MG/DL
HDLC SERPL-MCNC: 48 MG/DL (ref 35–70)
LDL CHOLESTEROL CALCULATED: 76 MG/DL (ref 0–160)
NONHDLC SERPL-MCNC: NORMAL MG/DL
POTASSIUM SERPL-SCNC: 4.7 MMOL/L
SODIUM BLD-SCNC: 139 MMOL/L
TOTAL PROTEIN: 7.5
TRIGL SERPL-MCNC: 92 MG/DL
VLDLC SERPL CALC-MCNC: 18 MG/DL

## 2021-03-12 ENCOUNTER — OFFICE VISIT (OUTPATIENT)
Dept: FAMILY MEDICINE CLINIC | Age: 81
End: 2021-03-12
Payer: MEDICARE

## 2021-03-12 VITALS
HEIGHT: 71 IN | TEMPERATURE: 98.5 F | DIASTOLIC BLOOD PRESSURE: 76 MMHG | SYSTOLIC BLOOD PRESSURE: 138 MMHG | OXYGEN SATURATION: 97 % | BODY MASS INDEX: 30.24 KG/M2 | HEART RATE: 86 BPM | WEIGHT: 216 LBS

## 2021-03-12 DIAGNOSIS — Z00.00 ROUTINE GENERAL MEDICAL EXAMINATION AT A HEALTH CARE FACILITY: Primary | ICD-10-CM

## 2021-03-12 PROCEDURE — G8482 FLU IMMUNIZE ORDER/ADMIN: HCPCS | Performed by: FAMILY MEDICINE

## 2021-03-12 PROCEDURE — G0438 PPPS, INITIAL VISIT: HCPCS | Performed by: FAMILY MEDICINE

## 2021-03-12 PROCEDURE — 1123F ACP DISCUSS/DSCN MKR DOCD: CPT | Performed by: FAMILY MEDICINE

## 2021-03-12 PROCEDURE — 4040F PNEUMOC VAC/ADMIN/RCVD: CPT | Performed by: FAMILY MEDICINE

## 2021-03-12 RX ORDER — TIOTROPIUM BROMIDE 18 UG/1
18 CAPSULE ORAL; RESPIRATORY (INHALATION) DAILY
Qty: 90 CAPSULE | Refills: 3 | Status: SHIPPED | OUTPATIENT
Start: 2021-03-12

## 2021-03-12 ASSESSMENT — LIFESTYLE VARIABLES
HOW OFTEN DO YOU HAVE A DRINK CONTAINING ALCOHOL: 2
HAS A RELATIVE, FRIEND, DOCTOR, OR ANOTHER HEALTH PROFESSIONAL EXPRESSED CONCERN ABOUT YOUR DRINKING OR SUGGESTED YOU CUT DOWN: 0
HOW OFTEN DURING THE LAST YEAR HAVE YOU NEEDED AN ALCOHOLIC DRINK FIRST THING IN THE MORNING TO GET YOURSELF GOING AFTER A NIGHT OF HEAVY DRINKING: 0
HOW OFTEN DURING THE LAST YEAR HAVE YOU HAD A FEELING OF GUILT OR REMORSE AFTER DRINKING: 0
HOW OFTEN DURING THE LAST YEAR HAVE YOU BEEN UNABLE TO REMEMBER WHAT HAPPENED THE NIGHT BEFORE BECAUSE YOU HAD BEEN DRINKING: 0

## 2021-03-12 ASSESSMENT — PATIENT HEALTH QUESTIONNAIRE - PHQ9
2. FEELING DOWN, DEPRESSED OR HOPELESS: 0
SUM OF ALL RESPONSES TO PHQ QUESTIONS 1-9: 0
1. LITTLE INTEREST OR PLEASURE IN DOING THINGS: 0
SUM OF ALL RESPONSES TO PHQ QUESTIONS 1-9: 0
SUM OF ALL RESPONSES TO PHQ9 QUESTIONS 1 & 2: 0
SUM OF ALL RESPONSES TO PHQ QUESTIONS 1-9: 0

## 2021-03-12 NOTE — PATIENT INSTRUCTIONS
SURVEY:    You may be receiving a survey from [employer] regarding your visit today. Please complete the survey to enable us to provide the highest quality of care to you and your family. If you cannot score us a very good on any question, please call the office to discuss how we could of made your experience a very good one. Thank you. Personalized Preventive Plan for Antoni Cleveland - 3/12/2021  Medicare offers a range of preventive health benefits. Some of the tests and screenings are paid in full while other may be subject to a deductible, co-insurance, and/or copay. Some of these benefits include a comprehensive review of your medical history including lifestyle, illnesses that may run in your family, and various assessments and screenings as appropriate. After reviewing your medical record and screening and assessments performed today your provider may have ordered immunizations, labs, imaging, and/or referrals for you. A list of these orders (if applicable) as well as your Preventive Care list are included within your After Visit Summary for your review. Other Preventive Recommendations:    · A preventive eye exam performed by an eye specialist is recommended every 1-2 years to screen for glaucoma; cataracts, macular degeneration, and other eye disorders. · A preventive dental visit is recommended every 6 months. · Try to get at least 150 minutes of exercise per week or 10,000 steps per day on a pedometer . · Order or download the FREE \"Exercise & Physical Activity: Your Everyday Guide\" from [employer]. Call 9-111.607.6907 or search [employer] online. · You need 9514-3187 mg of calcium and 7711-9471 IU of vitamin D per day.  It is possible to meet your calcium requirement with diet alone, but a vitamin D supplement is usually necessary to meet this goal.  · When exposed to the sun, use a sunscreen that protects against both UVA and UVB radiation with an SPF of 30 or greater. Reapply every 2 to 3 hours or after sweating, drying off with a towel, or swimming. · Always wear a seat belt when traveling in a car. Always wear a helmet when riding a bicycle or motorcycle.

## 2021-03-12 NOTE — PROGRESS NOTES
Medicare Annual Wellness Visit  Name: Vik Ramos Date: 3/12/2021   MRN: [id number] Sex: Male   Age: [de-identified] y.o. Ethnicity: Non-/Non    : 1940 Race: Silvestre Cleveland is here for Kiveda (Patient presents today for AWV, no concerns )    Screenings for behavioral, psychosocial and functional/safety risks, and cognitive dysfunction are all negative except as indicated below. These results, as well as other patient data from the [address] form, are documented in Flowsheets linked to this Encounter. No Known Allergies    Prior to Visit Medications    Medication Sig Taking? Authorizing Provider   tiotropium (SPIRIVA HANDIHALER) 18 MCG inhalation capsule Inhale 1 capsule into the lungs daily Yes Hoda Marin MD   propranolol (INDERAL LA) 80 MG extended release capsule Take 1 capsule by mouth daily Yes Hoda Marin MD   lovastatin (MEVACOR) 40 MG tablet Take 1 tablet by mouth nightly Yes Hoda Marin MD   amLODIPine (NORVASC) 5 MG tablet Take 1 tablet by mouth 2 times daily Yes Hoda Marin MD   sertraline (ZOLOFT) 50 MG tablet Take 1 tablet by mouth daily Yes Hoda Marin MD   lisinopril (ZESTRIL) 30 MG tablet Take 1 tablet by mouth daily Yes Hoda Marin MD   Multiple Vitamins-Minerals (EQ VISION FORMULA 50+) CAPS Take by mouth daily Yes Historical Provider, MD   Multiple Vitamin (MULTI VITAMIN DAILY PO) Take by mouth daily Yes Historical Provider, MD   Docusate Calcium (STOOL SOFTENER PO) Take  by mouth daily.  Yes Historical Provider, MD   albuterol (PROVENTIL) (2.5 MG/3ML) 0.083% nebulizer solution Take 3 mLs by nebulization every 6 hours as needed for Wheezing  Patient not taking: Reported on 3/3/2021  Gabriella Carey,    RaNITidine HCl (RANITIDINE 150 MAX STRENGTH PO) Take by mouth daily as needed  Historical Provider, MD   SUMAtriptan (IMITREX) 25 MG tablet Take 1 tablet by mouth once as needed for Migraine  Patient not taking: Reported on OFFICE/OUTPT VISIT,PROCEDURE ONLY Left 4/11/2018    LEG ULCER DEBRIDEMENT APPLICATION OF NUSHIELD-LEFT LEG performed by Clifford Crump MD at [address] OFFICE/OUTPT [address] Left 4/18/2018    LEG DEBRIDEMENT SKIN GRAFT- APPLICATION OF AFFINITY performed by Clifford Crump MD at [address] Left 11/22/2017    SKIN GRAFT SPLIT THICKNESS performed by Clifford Crump MD at [address] [facility]         History reviewed. No pertinent family history. CareTeam (Including outside providers/suppliers regularly involved in providing care):   Patient Care Team:  Ovidio Peralta MD as PCP - General (Family Medicine)  Ovidio Peralta MD as PCP - [address] [place] [address] Provider  Clifford Crump MD as Consulting Physician (General Surgery)    Wt Readings from Last 3 Encounters:   03/12/21 216 lb (98 kg)   03/03/21 212 lb 14.4 oz (96.6 kg)   06/09/20 208 lb (94.3 kg)     Vitals:    03/12/21 1113   BP: 138/76   Site: Right Upper Arm   Position: Sitting   Cuff Size: Medium Adult   Pulse: 86   Temp: 98.5 °F (36.9 °C)   TempSrc: Oral   SpO2: 97%   Weight: 216 lb (98 kg)   Height: 5' 11\" (1.803 m)     Body mass index is 30.13 kg/m². Based upon direct observation of the patient, evaluation of cognition reveals recent and remote memory intact. General Appearance: alert and oriented to person, place and time, well-developed and well-nourished, in no acute distress  Skin: warm and dry, no rash or erythema, pt has Lt mid arm skin tear and ecchymosis from hitting it recently.  No drainage or erythema  Head: normocephalic and atraumatic  Eyes: pupils equal, round, and reactive to light, extraocular eye movements intact, conjunctivae normal  ENT: bilateral cerumen impaction noted  Neck: neck supple and non tender without mass, no thyromegaly or thyroid nodules, no cervical lymphadenopathy   Pulmonary/Chest: clear to auscultation bilaterally- no wheezes, rales or rhonchi, normal air movement, no respiratory distress  Cardiovascular: normal rate, normal S1 and S2, no gallops, intact distal pulses and no carotid bruits  Abdomen: soft, non-tender, non-distended, normal bowel sounds, no masses or organomegaly  Extremities: no cyanosis, no clubbing and no edema  Musculoskeletal: normal range of motion, no joint swelling, deformity or tenderness    Patient's complete Health Risk Assessment and screening values have been reviewed and are found in Flowsheets. The following problems were reviewed today and where indicated follow up appointments were made and/or referrals ordered. Positive Risk Factor Screenings with Interventions:          General Health and ACP:  General  In general, how would you say your health is?: Very Good  In the past 7 days, have you experienced any of the following?  New or Increased Pain, New or Increased Fatigue, Loneliness, Social Isolation, Stress or Anger?: None of These  Do you get the social and emotional support that you need?: Yes  Do you have a Living Will?: Yes  Advance Directives     Power of GIULIANA [employer] Will ACP-Advance Directive ACP-Power of     Not on File Not on File Not on File Not on File      General Health Risk Interventions:  · no concerns    Health Habits/Nutrition:  Health Habits/Nutrition  Have you lost any weight without trying in the past 3 months?: No  Do you eat only one meal per day?: No  Have you seen the dentist within the past year?: (!) No  Body mass index: (!) 30.12  Health Habits/Nutrition Interventions:  · Dental exam overdue:  pt has no teeth    Hearing/Vision:  No exam data present  Hearing/Vision  Do you or your family notice any trouble with your hearing that hasn't been managed with hearing aids?: No  Do you have difficulty driving, watching TV, or doing any of your daily activities because of your eyesight?: No  Have you had an eye exam within the past year?: (!) No  Hearing/Vision Interventions:  · Vision concerns:  patient encouraged to make appointment with his/her eye specialist      Personalized Preventive Plan   Current Health Maintenance Status  Immunization History   Administered Date(s) Administered    COVID-19, Ck Tiwari, PF, 30mcg/0.3mL 01/30/2021, 02/20/2021    Influenza 11/04/2011    Influenza Virus Vaccine 10/15/2014, 11/09/2015, 11/30/2016    Influenza, High Dose (Fluzone 65 yrs and older) 10/10/2018, 11/08/2019, 10/26/2020    Influenza, High-dose, Latina Kocher, 65 yrs +, IM (Fluzone) 10/26/2020    Influenza, Quadv, IM, PF (6 mo and older Fluzone, Flulaval, Fluarix, and 3 yrs and older Afluria) 10/09/2017    Pneumococcal Conjugate 13-valent ([id number]) 03/30/2016    Pneumococcal Polysaccharide ([id number]) 11/04/2011    Tdap (Boostrix, Adacel) 11/14/2012        Health Maintenance   Topic Date Due    Shingles Vaccine (1 of 2) Never done   [employer] Visit (AWV)  Never done    Lipid screen  03/08/2022    Potassium monitoring  03/08/2022    Creatinine monitoring  03/08/2022    DTaP/Tdap/Td vaccine (2 - Td) 11/14/2022    Flu vaccine  Completed    Pneumococcal 65+ years Vaccine  Completed    COVID-19 Vaccine  Completed    Hepatitis A vaccine  Aged Out    Hepatitis B vaccine  Aged Out    Hib vaccine  Aged Out    Meningococcal (ACWY) vaccine  Aged Out     Recommendations for [employer] Due: see orders and patient instructions/AVS.  . Recommended screening schedule for the next 5-10 years is provided to the patient in written form: see Patient Instructions/AVS.    Qasim Huerta was seen today for medicare awv. Diagnoses and all orders for this visit:    Routine general medical examination at a health care facility    Other orders  -     tiotropium (SPIRIVA HANDIHALER) 18 MCG inhalation capsule;  Inhale 1 capsule into the lungs daily

## 2021-06-19 DIAGNOSIS — F34.1 DYSTHYMIC DISORDER: ICD-10-CM

## 2021-06-21 NOTE — TELEPHONE ENCOUNTER
Last OV: 3/12/2021Awv  Last RX:   Next scheduled apt: 9/14/2021      Sure scripts request      RX pending

## 2021-07-13 DIAGNOSIS — I10 ESSENTIAL HYPERTENSION, BENIGN: ICD-10-CM

## 2021-07-13 RX ORDER — LISINOPRIL 30 MG/1
TABLET ORAL
Qty: 90 TABLET | Refills: 0 | Status: SHIPPED | OUTPATIENT
Start: 2021-07-13 | End: 2021-09-14 | Stop reason: SDUPTHER

## 2021-07-13 NOTE — TELEPHONE ENCOUNTER
Last OV: 3/12/2021  Last RX:   Next scheduled apt: 9/14/2021        Sure scripts request      Rx pending

## 2021-08-03 ENCOUNTER — TELEPHONE (OUTPATIENT)
Dept: FAMILY MEDICINE CLINIC | Age: 81
End: 2021-08-03

## 2021-08-03 NOTE — TELEPHONE ENCOUNTER
[facility] said Arleen Lock is running out of a wrap he uses for his leg. He said he had surgery done on it and with the new skin it isn't supposed to be directly in the sun. She said its the wrap that is used when you break an arm. Its on a roll and tan. She would like to know where she could pick this up at. Please call [facility].     Health Maintenance   Topic Date Due    Shingles Vaccine (1 of 2) Never done    Flu vaccine (1) 09/01/2021    Lipid screen  03/08/2022    Potassium monitoring  03/08/2022    Creatinine monitoring  03/08/2022    Annual Wellness Visit (AWV)  03/13/2022    DTaP/Tdap/Td vaccine (2 - Td or Tdap) 11/14/2022    Pneumococcal 65+ years Vaccine  Completed    COVID-19 Vaccine  Completed    Hepatitis A vaccine  Aged Out    Hepatitis B vaccine  Aged Out    Hib vaccine  Aged Out    Meningococcal (ACWY) vaccine  Aged Out             (applicable per patient's age: Cancer Screenings, Depression Screening, Fall Risk Screening, Immunizations)    Hemoglobin A1C (%)   Date Value   04/16/2019 5.8   05/09/2018 6.1 (H)   09/27/2016 5.9     LDL Cholesterol (mg/dL)   Date Value   10/30/2019 138 (H)     LDL Calculated (mg/dL)   Date Value   03/08/2021 76     AST (U/L)   Date Value   03/08/2021 23     ALT (U/L)   Date Value   03/08/2021 21.0     BUN (mg/dL)   Date Value   03/08/2021 21      (goal A1C is < 7)   (goal LDL is <100) need 30-50% reduction from baseline     BP Readings from Last 3 Encounters:   03/12/21 138/76   03/03/21 (!) 144/75   06/09/20 138/70    (goal /80)      All Future Testing planned in [facility]:  Lab Frequency Next Occurrence   CT CHEST W CONTRAST Once 03/01/2022   CBC Auto Differential Once 02/09/2022       Next Visit Date:  Future Appointments   Date Time Provider Michael Norwood   9/14/2021  1:40 PM [facility]   2/14/2022  1:45 PM [facility] CAT SCAN ROOM [facility] CT [facility] Rad   3/2/2022  1:45 PM Jocelyn Nina MD [facility]            Patient Active Problem List:     Classical migraine     Pure hypercholesterolemia     Essential hypertension, benign     Dysthymic disorder     Impaired fasting glucose     COPD (chronic obstructive pulmonary disease) (HCC)     Leg mass, left     Leiomyosarcoma of leg, left (HCC)     Open wound of left lower leg     Hyperglycemia

## 2021-08-14 DIAGNOSIS — E78.00 PURE HYPERCHOLESTEROLEMIA: ICD-10-CM

## 2021-08-14 DIAGNOSIS — I10 ESSENTIAL HYPERTENSION, BENIGN: ICD-10-CM

## 2021-08-16 RX ORDER — LOVASTATIN 40 MG/1
40 TABLET ORAL NIGHTLY
Qty: 90 TABLET | Refills: 0 | Status: SHIPPED | OUTPATIENT
Start: 2021-08-16 | End: 2021-09-14 | Stop reason: SDUPTHER

## 2021-08-16 RX ORDER — PROPRANOLOL HYDROCHLORIDE 80 MG/1
CAPSULE, EXTENDED RELEASE ORAL
Qty: 90 CAPSULE | Refills: 0 | Status: SHIPPED | OUTPATIENT
Start: 2021-08-16 | End: 2021-09-14 | Stop reason: SDUPTHER

## 2021-08-16 NOTE — TELEPHONE ENCOUNTER
Last OV: 3/12/2021 medicare  Last RX:   Next scheduled apt: 9/14/2021      Sure scripts request    RX pending

## 2021-09-14 ENCOUNTER — OFFICE VISIT (OUTPATIENT)
Dept: FAMILY MEDICINE CLINIC | Age: 81
End: 2021-09-14
Payer: MEDICARE

## 2021-09-14 VITALS
DIASTOLIC BLOOD PRESSURE: 68 MMHG | HEART RATE: 54 BPM | SYSTOLIC BLOOD PRESSURE: 132 MMHG | BODY MASS INDEX: 28.73 KG/M2 | WEIGHT: 206 LBS | OXYGEN SATURATION: 96 %

## 2021-09-14 DIAGNOSIS — F34.1 DYSTHYMIC DISORDER: ICD-10-CM

## 2021-09-14 DIAGNOSIS — I10 ESSENTIAL HYPERTENSION, BENIGN: ICD-10-CM

## 2021-09-14 DIAGNOSIS — J43.1 PANLOBULAR EMPHYSEMA (HCC): ICD-10-CM

## 2021-09-14 DIAGNOSIS — E78.00 PURE HYPERCHOLESTEROLEMIA: ICD-10-CM

## 2021-09-14 PROCEDURE — 1123F ACP DISCUSS/DSCN MKR DOCD: CPT | Performed by: FAMILY MEDICINE

## 2021-09-14 PROCEDURE — 99213 OFFICE O/P EST LOW 20 MIN: CPT | Performed by: FAMILY MEDICINE

## 2021-09-14 PROCEDURE — G8427 DOCREV CUR MEDS BY ELIG CLIN: HCPCS | Performed by: FAMILY MEDICINE

## 2021-09-14 PROCEDURE — 1036F TOBACCO NON-USER: CPT | Performed by: FAMILY MEDICINE

## 2021-09-14 PROCEDURE — 3023F SPIROM DOC REV: CPT | Performed by: FAMILY MEDICINE

## 2021-09-14 PROCEDURE — G8926 SPIRO NO PERF OR DOC: HCPCS | Performed by: FAMILY MEDICINE

## 2021-09-14 PROCEDURE — G8417 CALC BMI ABV UP PARAM F/U: HCPCS | Performed by: FAMILY MEDICINE

## 2021-09-14 PROCEDURE — 4040F PNEUMOC VAC/ADMIN/RCVD: CPT | Performed by: FAMILY MEDICINE

## 2021-09-14 RX ORDER — PROPRANOLOL HYDROCHLORIDE 80 MG/1
CAPSULE, EXTENDED RELEASE ORAL
Qty: 90 CAPSULE | Refills: 1 | Status: SHIPPED | OUTPATIENT
Start: 2021-09-14 | End: 2022-03-15 | Stop reason: SDUPTHER

## 2021-09-14 RX ORDER — AMLODIPINE BESYLATE 5 MG/1
5 TABLET ORAL 2 TIMES DAILY
Qty: 180 TABLET | Refills: 1 | Status: SHIPPED | OUTPATIENT
Start: 2021-09-14 | End: 2022-02-15

## 2021-09-14 RX ORDER — LOVASTATIN 40 MG/1
40 TABLET ORAL NIGHTLY
Qty: 90 TABLET | Refills: 0 | Status: SHIPPED | OUTPATIENT
Start: 2021-09-14 | End: 2021-11-22

## 2021-09-14 RX ORDER — LISINOPRIL 30 MG/1
TABLET ORAL
Qty: 90 TABLET | Refills: 1 | Status: SHIPPED | OUTPATIENT
Start: 2021-09-14 | End: 2022-03-15 | Stop reason: SDUPTHER

## 2021-09-14 SDOH — ECONOMIC STABILITY: FOOD INSECURITY: WITHIN THE PAST 12 MONTHS, YOU WORRIED THAT YOUR FOOD WOULD RUN OUT BEFORE YOU GOT MONEY TO BUY MORE.: NEVER TRUE

## 2021-09-14 SDOH — ECONOMIC STABILITY: FOOD INSECURITY: WITHIN THE PAST 12 MONTHS, THE FOOD YOU BOUGHT JUST DIDN'T LAST AND YOU DIDN'T HAVE MONEY TO GET MORE.: NEVER TRUE

## 2021-09-14 ASSESSMENT — ENCOUNTER SYMPTOMS
SPUTUM PRODUCTION: 0
HEMOPTYSIS: 0
BLOOD IN STOOL: 0
BLURRED VISION: 0
SORE THROAT: 0
EYE REDNESS: 0
ABDOMINAL PAIN: 0
DIARRHEA: 0
VOMITING: 0
EYE DISCHARGE: 0
SHORTNESS OF BREATH: 0
WHEEZING: 0
TROUBLE SWALLOWING: 0
COUGH: 0
DIFFICULTY BREATHING: 0
CONSTIPATION: 0
NAUSEA: 0
RHINORRHEA: 0

## 2021-09-14 ASSESSMENT — SOCIAL DETERMINANTS OF HEALTH (SDOH): HOW HARD IS IT FOR YOU TO PAY FOR THE VERY BASICS LIKE FOOD, HOUSING, MEDICAL CARE, AND HEATING?: NOT HARD AT ALL

## 2021-09-14 ASSESSMENT — COPD QUESTIONNAIRES: COPD: 1

## 2021-09-14 NOTE — PROGRESS NOTES
HPI Notes    Name: Taz Neely  : 1940        Chief Complaint:     Chief Complaint   Patient presents with    Hypertension    Hyperlipidemia    COPD    Depression     chronic but stable, Pt taking Zoloft daily as directed. History of Present Illness:     Taz Neely is a 80 y.o.  male who presents with Hypertension, Hyperlipidemia, COPD, and Depression (chronic but stable, Pt taking Zoloft daily as directed.)      Hypertension  This is a chronic problem. The current episode started more than 1 year ago. The problem is unchanged. The problem is controlled. Pertinent negatives include no blurred vision, chest pain, headaches, palpitations, peripheral edema or shortness of breath. Risk factors for coronary artery disease include dyslipidemia and male gender. The current treatment provides significant improvement. Hyperlipidemia  This is a chronic problem. The current episode started more than 1 year ago. The problem is controlled. Recent lipid tests were reviewed and are normal. He has no history of diabetes or hypothyroidism. Pertinent negatives include no chest pain or shortness of breath. Current antihyperlipidemic treatment includes statins. The current treatment provides significant improvement of lipids. 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 Risk factors for coronary artery disease include diabetes mellitus, dyslipidemia and hypertension. COPD  There is no cough, difficulty breathing, hemoptysis, shortness of breath, sputum production or wheezing. This is a chronic problem. The current episode started more than 1 year ago. The problem has been unchanged. Pertinent negatives include no chest pain, ear pain, fever, headaches, nasal congestion, rhinorrhea, sore throat or trouble swallowing.  Relieved by: spiriva but hasn't used it since he has been feeling ok  His past medical history is significant for COPD.     Depression - chronic but stable on zoloft for years. No changes. Appetite and sleeping both good. [place] and likes to go fishing.        Past Medical History:     Past Medical History:   Diagnosis Date    Cancer ([facility] [address]     left lower leg    COPD (chronic obstructive pulmonary disease) ([facility] [address]     Hyperlipidemia     Hypertension     Migraine       Reviewed all health maintenance requirements and ordered appropriate tests  Health Maintenance Due   Topic Date Due    Shingles Vaccine (1 of 2) Never done    Flu vaccine (1) 09/01/2021       Past Surgical History:     Past Surgical History:   Procedure Laterality Date    EXCISION / BIOPSY SKIN LESION OF LEG Left 11/13/2017    LEG LESION BIOPSY EXCISION-EXCISION LEFT LEG LESION performed by Ede Lepe MD at [facility] [address] OF LEG  11/22/2017    LEG LESION BIOPSY EXCISION performed by Ede Lepe MD at [address] [facility] OFFICE/OUTPT VISIT,PROCEDURE ONLY Left 1/24/2018    LEG DEBRIDEMENT SKIN GRAFT--LEFT LEG DEBRIDEMENT WITH APLIGRAFT APPLICATION performed by Ede Lepe MD at [address] OFFICE/OUTPT [address] Left 2/1/2018    LEG ULCER DEBRIDEMENT APPLICATION OF APLIGRAF performed by Ede Lepe MD at [address] OFFICE/OUTPT [address] Left 2/12/2018    LEG ULCER DEBRIDEMENT APPLICATION OF APLIGRAF----Application Apligraf ,Debridement Left Lower Leg Wound performed by Ede Lepe MD at [address] OFFICE/OUTPT [address] Left 2/26/2018    LEG ULCER DEBRIDEMENT APPLICATION OF APLIGRAF performed by Ede Lepe MD at [address]OUTPT [address] Left 3/12/2018    LEG ULCER [address] LEFT LOWER LEG performed by Ede Lepe MD at [address] OFFICE/OUTPT [address] Left 3/19/2018    LEG ULCER [address] performed by Anamaria Robertson Selvin Gonsales MD at [address] [facility] OFFICE/OUTPT [address] Left 3/26/2018    LEG ULCER DEBRIDEMENT APPLICATION OF [place] LEFT LOWER LEG performed by Riley Birmingham MD at [address] [facility] OFFICE/OUTPT [address] Left 4/2/2018    LEG ULCER DEBRIDEMENT APPLICATION OF [place]- LEFT LEG performed by Riley Birmingham MD at [address] [facility] OFFICE/OUTPT [address] Left 4/11/2018    LEG ULCER DEBRIDEMENT APPLICATION OF [place]-LEFT LEG performed by Riley Birmingham MD at [address] [facility] OFFICE/OUTPT [address] Left 4/18/2018    LEG DEBRIDEMENT SKIN GRAFT- APPLICATION OF AFFINITY performed by Riley Birmingham MD at [address] Left 11/22/2017    SKIN GRAFT SPLIT THICKNESS performed by Riley Birmingham MD at [address] [facility]          Medications:       Prior to Admission medications    Medication Sig Start Date End Date Taking? Authorizing Provider   sertraline (ZOLOFT) 50 MG tablet Take 1 tablet by mouth once daily 9/14/21  Yes Rhonda Cee MD   propranolol (INDERAL LA) 80 MG extended release capsule Take 1 capsule by mouth once daily 9/14/21  Yes Rhonda Cee MD   lovastatin (MEVACOR) 40 MG tablet Take 1 tablet by mouth nightly 9/14/21  Yes Rhonda Cee MD   lisinopril (PRINIVIL;ZESTRIL) 30 MG tablet Take 1 tablet by mouth once daily 9/14/21  Yes Rhonda Cee MD   amLODIPine (NORVASC) 5 MG tablet Take 1 tablet by mouth 2 times daily 9/14/21  Yes Rhonda Cee MD   Multiple Vitamins-Minerals (EQ VISION FORMULA 50+) CAPS Take by mouth daily   Yes Historical Provider, MD   Multiple Vitamin (MULTI VITAMIN DAILY PO) Take by mouth daily   Yes Historical Provider, MD   Docusate Calcium (STOOL SOFTENER PO) Take  by mouth daily.    Yes Historical Provider, MD   tiotropium (SPIRIVA HANDIHALER) 18 MCG inhalation capsule Inhale 1 capsule into the lungs daily  Patient not taking: Reported on 9/14/2021 3/12/21   Rhonda Cee MD   albuterol (PROVENTIL) (2.5 MG/3ML) 0.083% nebulizer solution Take 3 mLs by nebulization every 6 hours as needed for Wheezing  Patient not taking: Reported on 3/3/2021 5/29/19   Christin Carey, DO   RaNITidine HCl (RANITIDINE 150 MAX STRENGTH PO) Take by mouth daily as needed  Patient not taking: Reported on 9/14/2021    Historical Provider, MD   SUMAtriptan (IMITREX) 25 MG tablet Take 1 tablet by mouth once as needed for Migraine  Patient not taking: Reported on 6/3/2020 6/28/18 6/3/20  LEDY Cruz - CNP        Allergies:       Patient has no known allergies. Social History:     Tobacco:    reports that he quit smoking about 18 years ago. His smoking use included cigarettes. He started smoking about 62 years ago. He has a 216.00 pack-year smoking history. He has never used smokeless tobacco.  Alcohol:      reports current alcohol use of about 1.0 standard drinks of alcohol per week. Drug Use:  reports no history of drug use. Family History:     History reviewed. No pertinent family history. Review of Systems:       Review of Systems   Constitutional: Negative for chills, fatigue and fever. HENT: Negative for ear pain, rhinorrhea, sore throat and trouble swallowing. Eyes: Negative for blurred vision, discharge, redness and visual disturbance. Respiratory: Negative for cough, hemoptysis, sputum production, shortness of breath and wheezing. Cardiovascular: Negative for chest pain and palpitations. Gastrointestinal: Negative for abdominal pain, blood in stool, constipation, diarrhea, nausea and vomiting. Genitourinary: Negative for dysuria and hematuria. Musculoskeletal: Negative for joint swelling and neck stiffness. Skin: Negative for pallor and rash. Neurological: Negative for dizziness, tremors, light-headedness and headaches. Psychiatric/Behavioral: Negative for confusion and sleep disturbance. Physical Exam:     Physical Exam  Vitals reviewed.    Constitutional:       General: He is not in acute distress. Appearance: Normal appearance. He is well-developed. He is not ill-appearing. HENT:      Head: Normocephalic and atraumatic. Eyes:      Conjunctiva/sclera: Conjunctivae normal.      Pupils: Pupils are equal, round, and reactive to light. Neck:      Thyroid: No thyromegaly. Vascular: No carotid bruit. Cardiovascular:      Rate and Rhythm: Normal rate and regular rhythm. Heart sounds: No murmur heard. Pulmonary:      Effort: Pulmonary effort is normal.      Breath sounds: Normal breath sounds. Abdominal:      General: Bowel sounds are normal.      Palpations: Abdomen is soft. Tenderness: There is no abdominal tenderness. Musculoskeletal:      Cervical back: Neck supple. Lymphadenopathy:      Cervical: No cervical adenopathy. Skin:     General: Skin is warm and dry. Findings: No rash. Neurological:      Mental Status: He is alert and oriented to person, place, and time. Vitals:  /68 (Site: Right Upper Arm, Cuff Size: Medium Adult)   Pulse 54   Wt 206 lb (93.4 kg)   SpO2 96%   BMI 28.73 kg/m²       Data:     Lab Results   Component Value Date    .0 03/08/2021    K 4.7 03/08/2021     03/08/2021    CO2 29 03/08/2021    BUN 21 03/08/2021    CREATININE 1.16 03/08/2021    GLUCOSE 127.0 03/08/2021    PROT 7.6 02/23/2021    LABALBU 4.2 03/08/2021    BILITOT 0.7 03/08/2021    ALKPHOS 64 03/08/2021    AST 23 03/08/2021    ALT 21.0 03/08/2021     Lab Results   Component Value Date    WBC 8.4 02/23/2021    RBC 4.88 02/23/2021    HGB 15.2 02/23/2021    HCT 47.4 02/23/2021    MCV 97.1 02/23/2021    MCH 31.1 02/23/2021    MCHC 32.1 02/23/2021    RDW 11.9 02/23/2021     02/23/2021    MPV 10.8 02/23/2021     Lab Results   Component Value Date    TSH 1.20 05/09/2018     Lab Results   Component Value Date    CHOL 142.0 03/08/2021    HDL 48 03/08/2021    PSA 0.73 05/04/2015    LABA1C 5.8 04/16/2019          Assessment/Plan:        1. Essential hypertension, benign  Stable on inderal, zestril and norvasc and labs in 6mos   - propranolol (INDERAL LA) 80 MG extended release capsule; Take 1 capsule by mouth once daily  Dispense: 90 capsule; Refill: 1  - lisinopril (PRINIVIL;ZESTRIL) 30 MG tablet; Take 1 tablet by mouth once daily  Dispense: 90 tablet; Refill: 1  - amLODIPine (NORVASC) 5 MG tablet; Take 1 tablet by mouth 2 times daily  Dispense: 180 tablet; Refill: 1  - Lipid Panel; Future  - Comprehensive Metabolic Panel; Future    2. Pure hypercholesterolemia  Stable on mevacor and labs in 6mos   - lovastatin (MEVACOR) 40 MG tablet; Take 1 tablet by mouth nightly  Dispense: 90 tablet; Refill: 0  - Lipid Panel; Future  - Comprehensive Metabolic Panel; Future    3. Panlobular emphysema ([address]  Stable and has spiriva to use as needed. 4. Dysthymic disorder  Stable on zoloft  - sertraline (ZOLOFT) 50 MG tablet; Take 1 tablet by mouth once daily  Dispense: 90 tablet; Refill: 1        Lazaro received counseling on the following healthy behaviors: nutrition and exercise  Reviewed prior labs and health maintenance  Continue current medications, diet and exercise. Discussed use, benefit, and side effects of prescribed medications. Barriers to medication compliance addressed. Patient given educational materials - see patient instructions  Was a self-tracking handout given in paper form or via bewarkett?  Yes    Requested Prescriptions     Signed Prescriptions Disp Refills    sertraline (ZOLOFT) 50 MG tablet 90 tablet 1     Sig: Take 1 tablet by mouth once daily    propranolol (INDERAL LA) 80 MG extended release capsule 90 capsule 1     Sig: Take 1 capsule by mouth once daily    lovastatin (MEVACOR) 40 MG tablet 90 tablet 0     Sig: Take 1 tablet by mouth nightly    lisinopril (PRINIVIL;ZESTRIL) 30 MG tablet 90 tablet 1     Sig: Take 1 tablet by mouth once daily    amLODIPine (NORVASC) 5 MG tablet 180 tablet 1     Sig: Take 1 tablet by mouth 2 times daily       All patient questions answered. Patient voiced understanding. Quality Measures    Body mass index is 28.73 kg/m². Normal. Weight control planned discussed Healthy diet and regular exercise. BP: 132/68. Blood pressure is normal. Treatment plan consists of No treatment change needed. Fall Risk 3/12/2021 3/12/2021 6/9/2020 4/16/2019 4/9/2018 10/15/2014   2 or more falls in past year? no no no no no no   Fall with injury in past year? no no no no no no     The patient does not have a history of falls. I did not - not indicated , complete a risk assessment for falls. A plan of care for falls No Treatment plan indicated    Lab Results   Component Value Date    LDLCALC 76 03/08/2021    LDLCHOLESTEROL 138 (H) 10/30/2019    (goal LDL reduction with dx if diabetes is 50% LDL reduction)    PHQ Scores 3/12/2021 4/9/2018   PHQ2 Score 0 2   PHQ9 Score 0 2     Interpretation of Total Score Depression Severity: 1-4 = Minimal depression, 5-9 = Mild depression, 10-14 = Moderate depression, 15-19 = Moderately severe depression, 20-27 = Severe depression        Return in about 6 months (around 3/14/2022).       Electronically signed by Lashell Medrano MD on 9/14/2021 at 2:18 PM

## 2021-11-22 DIAGNOSIS — E78.00 PURE HYPERCHOLESTEROLEMIA: ICD-10-CM

## 2021-11-22 RX ORDER — LOVASTATIN 40 MG/1
40 TABLET ORAL NIGHTLY
Qty: 90 TABLET | Refills: 0 | Status: SHIPPED | OUTPATIENT
Start: 2021-11-22 | End: 2022-02-21

## 2022-02-10 DIAGNOSIS — C49.22 LEIOMYOSARCOMA OF LEG, LEFT (HCC): Primary | ICD-10-CM

## 2022-02-14 ENCOUNTER — HOSPITAL ENCOUNTER (OUTPATIENT)
Dept: CT IMAGING | Age: 82
Discharge: HOME | End: 2022-02-16
Payer: MEDICARE

## 2022-02-14 ENCOUNTER — HOSPITAL ENCOUNTER (OUTPATIENT)
Age: 82
Discharge: HOME | End: 2022-02-14
Payer: MEDICARE

## 2022-02-14 DIAGNOSIS — C49.22 LEIOMYOSARCOMA OF LEG, LEFT (HCC): ICD-10-CM

## 2022-02-14 LAB
BUN BLDV-MCNC: 18 MG/DL (ref 8–23)
CREAT SERPL-MCNC: 0.92 MG/DL (ref 0.7–1.2)
GFR AFRICAN AMERICAN: >60 ML/MIN
GFR NON-AFRICAN AMERICAN: >60 ML/MIN
GFR SERPL CREATININE-BSD FRML MDRD: NORMAL ML/MIN/{1.73_M2}
GFR SERPL CREATININE-BSD FRML MDRD: NORMAL ML/MIN/{1.73_M2}

## 2022-02-14 PROCEDURE — 84520 ASSAY OF UREA NITROGEN: CPT

## 2022-02-14 PROCEDURE — 82565 ASSAY OF CREATININE: CPT

## 2022-02-14 PROCEDURE — 71260 CT THORAX DX C+: CPT

## 2022-02-14 PROCEDURE — 6360000004 HC RX CONTRAST MEDICATION: Performed by: INTERNAL MEDICINE

## 2022-02-14 PROCEDURE — 36415 COLL VENOUS BLD VENIPUNCTURE: CPT

## 2022-02-14 RX ADMIN — IOPAMIDOL 75 ML: 755 INJECTION, SOLUTION INTRAVENOUS at 12:35

## 2022-02-15 DIAGNOSIS — I10 ESSENTIAL HYPERTENSION, BENIGN: ICD-10-CM

## 2022-02-15 RX ORDER — AMLODIPINE BESYLATE 5 MG/1
TABLET ORAL
Qty: 180 TABLET | Refills: 0 | Status: SHIPPED | OUTPATIENT
Start: 2022-02-15 | End: 2022-07-11 | Stop reason: SDUPTHER

## 2022-02-15 NOTE — TELEPHONE ENCOUNTER
Last OV: 9/14/2021 HTN  Last RX:   Next scheduled apt: 3/15/2022        Sure scripts request      RX pending

## 2022-02-21 DIAGNOSIS — E78.00 PURE HYPERCHOLESTEROLEMIA: ICD-10-CM

## 2022-02-21 RX ORDER — LOVASTATIN 40 MG/1
40 TABLET ORAL NIGHTLY
Qty: 90 TABLET | Refills: 1 | Status: SHIPPED | OUTPATIENT
Start: 2022-02-21 | End: 2022-07-11 | Stop reason: SDUPTHER

## 2022-02-21 NOTE — TELEPHONE ENCOUNTER
Last OV: 9/14/2021 chronic  Last RX:    Next scheduled apt: 3/15/2022 chronic      surescript requesting refill

## 2022-02-28 ENCOUNTER — HOSPITAL ENCOUNTER (OUTPATIENT)
Age: 82
Discharge: HOME | End: 2022-02-28
Payer: MEDICARE

## 2022-02-28 DIAGNOSIS — I10 ESSENTIAL HYPERTENSION, BENIGN: ICD-10-CM

## 2022-02-28 DIAGNOSIS — C49.22 LEIOMYOSARCOMA OF LEG, LEFT (HCC): ICD-10-CM

## 2022-02-28 DIAGNOSIS — E78.00 PURE HYPERCHOLESTEROLEMIA: ICD-10-CM

## 2022-02-28 LAB
ABSOLUTE EOS #: 0.27 K/UL (ref 0–0.44)
ABSOLUTE IMMATURE GRANULOCYTE: 0.03 K/UL (ref 0–0.3)
ABSOLUTE LYMPH #: 1.95 K/UL (ref 1.1–3.7)
ABSOLUTE MONO #: 0.72 K/UL (ref 0.1–1.2)
ALBUMIN SERPL-MCNC: 4.6 G/DL (ref 3.5–5.2)
ALBUMIN/GLOBULIN RATIO: 1.8 (ref 1–2.5)
ALP BLD-CCNC: 83 U/L (ref 40–129)
ALT SERPL-CCNC: 18 U/L (ref 5–41)
ANION GAP SERPL CALCULATED.3IONS-SCNC: 8 MMOL/L (ref 9–17)
AST SERPL-CCNC: 17 U/L
BASOPHILS # BLD: 1 % (ref 0–2)
BASOPHILS ABSOLUTE: 0.06 K/UL (ref 0–0.2)
BILIRUB SERPL-MCNC: 0.46 MG/DL (ref 0.3–1.2)
BUN BLDV-MCNC: 17 MG/DL (ref 8–23)
BUN/CREAT BLD: 18 (ref 9–20)
CALCIUM SERPL-MCNC: 9.6 MG/DL (ref 8.6–10.4)
CHLORIDE BLD-SCNC: 100 MMOL/L (ref 98–107)
CHOLESTEROL/HDL RATIO: 2.8
CHOLESTEROL: 165 MG/DL
CO2: 30 MMOL/L (ref 20–31)
CREAT SERPL-MCNC: 0.96 MG/DL (ref 0.7–1.2)
EOSINOPHILS RELATIVE PERCENT: 3 % (ref 1–4)
GFR AFRICAN AMERICAN: >60 ML/MIN
GFR NON-AFRICAN AMERICAN: >60 ML/MIN
GFR SERPL CREATININE-BSD FRML MDRD: ABNORMAL ML/MIN/{1.73_M2}
GFR SERPL CREATININE-BSD FRML MDRD: ABNORMAL ML/MIN/{1.73_M2}
GLUCOSE BLD-MCNC: 131 MG/DL (ref 70–99)
HCT VFR BLD CALC: 49.6 % (ref 40.7–50.3)
HDLC SERPL-MCNC: 59 MG/DL
HEMOGLOBIN: 16.1 G/DL (ref 13–17)
IMMATURE GRANULOCYTES: 0 %
LDL CHOLESTEROL: 84 MG/DL (ref 0–130)
LYMPHOCYTES # BLD: 22 % (ref 24–43)
MCH RBC QN AUTO: 31.5 PG (ref 25.2–33.5)
MCHC RBC AUTO-ENTMCNC: 32.5 G/DL (ref 28.4–34.8)
MCV RBC AUTO: 97.1 FL (ref 82.6–102.9)
MONOCYTES # BLD: 8 % (ref 3–12)
NRBC AUTOMATED: 0 PER 100 WBC
PDW BLD-RTO: 11.9 % (ref 11.8–14.4)
PLATELET # BLD: 247 K/UL (ref 138–453)
PMV BLD AUTO: 10.9 FL (ref 8.1–13.5)
POTASSIUM SERPL-SCNC: 5.3 MMOL/L (ref 3.7–5.3)
RBC # BLD: 5.11 M/UL (ref 4.21–5.77)
SEG NEUTROPHILS: 66 % (ref 36–65)
SEGMENTED NEUTROPHILS ABSOLUTE COUNT: 5.68 K/UL (ref 1.5–8.1)
SODIUM BLD-SCNC: 138 MMOL/L (ref 135–144)
TOTAL PROTEIN: 7.2 G/DL (ref 6.4–8.3)
TRIGL SERPL-MCNC: 110 MG/DL
WBC # BLD: 8.7 K/UL (ref 3.5–11.3)

## 2022-02-28 PROCEDURE — 85025 COMPLETE CBC W/AUTO DIFF WBC: CPT

## 2022-02-28 PROCEDURE — 36415 COLL VENOUS BLD VENIPUNCTURE: CPT

## 2022-02-28 PROCEDURE — 80061 LIPID PANEL: CPT

## 2022-02-28 PROCEDURE — 80053 COMPREHEN METABOLIC PANEL: CPT

## 2022-03-02 ENCOUNTER — OFFICE VISIT (OUTPATIENT)
Dept: ONCOLOGY | Age: 82
End: 2022-03-02
Payer: MEDICARE

## 2022-03-02 VITALS
WEIGHT: 216 LBS | HEART RATE: 65 BPM | TEMPERATURE: 98 F | BODY MASS INDEX: 30.13 KG/M2 | DIASTOLIC BLOOD PRESSURE: 80 MMHG | SYSTOLIC BLOOD PRESSURE: 149 MMHG

## 2022-03-02 DIAGNOSIS — R91.8 PULMONARY NODULES: ICD-10-CM

## 2022-03-02 DIAGNOSIS — C49.22 LEIOMYOSARCOMA OF LEG, LEFT (HCC): Primary | ICD-10-CM

## 2022-03-02 PROCEDURE — 1036F TOBACCO NON-USER: CPT | Performed by: INTERNAL MEDICINE

## 2022-03-02 PROCEDURE — 1123F ACP DISCUSS/DSCN MKR DOCD: CPT | Performed by: INTERNAL MEDICINE

## 2022-03-02 PROCEDURE — G8482 FLU IMMUNIZE ORDER/ADMIN: HCPCS | Performed by: INTERNAL MEDICINE

## 2022-03-02 PROCEDURE — 99214 OFFICE O/P EST MOD 30 MIN: CPT | Performed by: INTERNAL MEDICINE

## 2022-03-02 PROCEDURE — G8427 DOCREV CUR MEDS BY ELIG CLIN: HCPCS | Performed by: INTERNAL MEDICINE

## 2022-03-02 PROCEDURE — 99211 OFF/OP EST MAY X REQ PHY/QHP: CPT

## 2022-03-02 PROCEDURE — G8417 CALC BMI ABV UP PARAM F/U: HCPCS | Performed by: INTERNAL MEDICINE

## 2022-03-02 PROCEDURE — 4040F PNEUMOC VAC/ADMIN/RCVD: CPT | Performed by: INTERNAL MEDICINE

## 2022-03-07 NOTE — PROGRESS NOTES
Chief Complaint   Patient presents with    Follow-up     leiomyosarcoma       DIAGNOSIS:   1. Leiomyosarcoma of the left leg  CURRENT THERAPY:  Status post wide excision in 2018  BRIEF CASE HISTORY:   Urszula Garcia is a very pleasant 80 y.o. male who was followed by Dr. Joyce Mendoza because of leiomyosarcoma of the left leg. He had a nodule  on the left shin since childhood but in 2018 started getting bigger. He had that excised and showed leiomyosarcoma low-grade. He had a complete resection and required multiple grafting to make sure it is completely closed. He was referred to oncology and was followed conservatively. INTERIM HISTORY:  The patient comes in today for a follow up, he is doing well without any symptoms. Specifically he denies any bleeding or bruising. The area is completely covered by skin and no ulcers. He has chronic fatigue and some shortness of breath but likely related to COPD. Symptoms are much better recently. Very minimal shortness of breath. No hemoptysis. No chest pain. PAST MEDICAL HISTORY: has a past medical history of Cancer ([facility] [address] COPD (chronic obstructive pulmonary disease) ([facility] [address] Hyperlipidemia, Hypertension, and Migraine. PAST SURGICAL HISTORY: has a past surgical history that includes Tonsillectomy; hernia repair; Hemorrhoid surgery; EXCISION / BIOPSY SKIN LESION OF LEG (Left, 11/13/2017);  Skin graft (Left, 11/22/2017); EXCISION / BIOPSY SKIN LESION OF LEG (11/22/2017); pr office/outpt visit,procedure only (Left, 1/24/2018); pr office/outpt visit,procedure only (Left, 2/1/2018); pr office/outpt visit,procedure only (Left, 2/12/2018); pr office/outpt visit,procedure only (Left, 2/26/2018); pr office/outpt visit,procedure only (Left, 3/12/2018); pr office/outpt visit,procedure only (Left, 3/19/2018); pr office/outpt visit,procedure only (Left, 3/26/2018); pr office/outpt visit,procedure only (Left, 4/2/2018); pr office/outpt visit,procedure only (Left, 4/11/2018); and pr office/outpt visit,procedure only (Left, 4/18/2018). CURRENT MEDICATIONS:  has a current medication list which includes the following prescription(s): lovastatin, amlodipine, sertraline, propranolol, lisinopril, eq vision formula 50+, multiple vitamin, docusate calcium, spiriva handihaler, albuterol, ranitidine hcl, and sumatriptan, and the following Facility-Administered Medications: lidocaine-epinephrine. ALLERGIES:  has No Known Allergies. FAMILY HISTORY: Negative for any hematological or oncological conditions. SOCIAL HISTORY:  reports that he quit smoking about 18 years ago. His smoking use included cigarettes. He started smoking about 63 years ago. He has a 216.00 pack-year smoking history. He has never used smokeless tobacco. He reports current alcohol use of about 1.0 standard drink of alcohol per week. He reports that he does not use drugs. REVIEW OF SYSTEMS:  General: no fever or night sweats, Weight is stable. ENT: No double or blurred vision, no tinnitus or hearing problem, no dysphagia or sore throat   Respiratory: No chest pain,+ shortness of breath, no cough or hemoptysis. Cardiovascular: Denies chest pain, PND or orthopnea. No L E swelling or palpitations. Gastrointestinal:    No nausea or vomiting, abdominal pain, diarrhea or constipation. Genitourinary: Denies dysuria, hematuria, frequency, urgency or incontinence. Neurological: Denies headaches, decreased LOC, no sensory or motor focal deficits. Musculoskeletal:  No arthralgia no back pain or joint swelling. Skin: There are no rashes or bleeding. Psychiatric:  No anxiety, no depression. Endocrine: no diabetes or thyroid disease. Hematologic: no bleeding , no adenopathy. PHYSICAL EXAM: Shows a well appearing 80y.o.-year-old male who is not in pain or distress. Vital Signs: Blood pressure (!) 149/80, pulse 65, temperature 98 °F (36.7 °C), temperature source Temporal, weight 216 lb (98 kg).  HEENT: Normocephalic and atraumatic. Pupils are equal, round, reactive to light and accommodation. Extraocular muscles are intact. Neck: Showed no JVD, no carotid bruit . Lungs: Clear to auscultation bilaterally. Heart: Regular without any murmur. Abdomen: Soft, nontender. No hepatosplenomegaly. Extremities: Lower extremities show no edema, clubbing, or cyanosis. He has a well-healed skin graft in the left shin. No evidence of recurrence breasts: Examination not done today. Neuro exam: intact cranial nerves bilaterally no motor or sensory deficit, gait is normal. Lymphatic: no adenopathy appreciated in the supraclavicular, axillary, cervical or inguinal area    Review of radiological studies:     Review of laboratory data:     IMPRESSION:   1. History of leiomyosarcoma of the left lower extremity, completely removed  2. COPD  3. In remission, surveillance  Plan:   Patient continues to do well with no evidence of recurrence. I reviewed the labs and CT scan with the patient and his wife. There is no evidence of relapse or any abnormalities. We again discussed the nature of this cancer and management plans. Continue surveillance. We will see him in 1 year with repeated chest CT scan and labs. Sooner for any problems. Patient's questions were answered to the best of his satisfaction and he verbalized full understanding and agreement. 6 [facility] ([facility]                            This note is created with the assistance of a speech recognition program.  While intending to generate a document that actually reflects the content of the visit, the document can still have some errors including those of syntax and sound a like substitutions which may escape proof reading. It such instances, actual meaning can be extrapolated by contextual diversion.

## 2022-03-15 ENCOUNTER — OFFICE VISIT (OUTPATIENT)
Dept: FAMILY MEDICINE CLINIC | Age: 82
End: 2022-03-15
Payer: MEDICARE

## 2022-03-15 VITALS
SYSTOLIC BLOOD PRESSURE: 158 MMHG | HEIGHT: 71 IN | WEIGHT: 214 LBS | BODY MASS INDEX: 29.96 KG/M2 | HEART RATE: 60 BPM | DIASTOLIC BLOOD PRESSURE: 80 MMHG | OXYGEN SATURATION: 96 %

## 2022-03-15 DIAGNOSIS — F34.1 DYSTHYMIC DISORDER: ICD-10-CM

## 2022-03-15 DIAGNOSIS — Z00.00 MEDICARE ANNUAL WELLNESS VISIT, SUBSEQUENT: Primary | ICD-10-CM

## 2022-03-15 DIAGNOSIS — L98.9 NON-HEALING SKIN LESION: ICD-10-CM

## 2022-03-15 DIAGNOSIS — I10 ESSENTIAL HYPERTENSION, BENIGN: ICD-10-CM

## 2022-03-15 DIAGNOSIS — E78.00 PURE HYPERCHOLESTEROLEMIA: ICD-10-CM

## 2022-03-15 DIAGNOSIS — J43.1 PANLOBULAR EMPHYSEMA (HCC): ICD-10-CM

## 2022-03-15 PROBLEM — S81.802A OPEN WOUND OF LEFT LOWER LEG: Status: RESOLVED | Noted: 2018-01-24 | Resolved: 2022-03-15

## 2022-03-15 PROCEDURE — G0439 PPPS, SUBSEQ VISIT: HCPCS | Performed by: FAMILY MEDICINE

## 2022-03-15 PROCEDURE — 3023F SPIROM DOC REV: CPT | Performed by: FAMILY MEDICINE

## 2022-03-15 PROCEDURE — 4040F PNEUMOC VAC/ADMIN/RCVD: CPT | Performed by: FAMILY MEDICINE

## 2022-03-15 PROCEDURE — G8417 CALC BMI ABV UP PARAM F/U: HCPCS | Performed by: FAMILY MEDICINE

## 2022-03-15 PROCEDURE — 1123F ACP DISCUSS/DSCN MKR DOCD: CPT | Performed by: FAMILY MEDICINE

## 2022-03-15 PROCEDURE — G8427 DOCREV CUR MEDS BY ELIG CLIN: HCPCS | Performed by: FAMILY MEDICINE

## 2022-03-15 PROCEDURE — 99214 OFFICE O/P EST MOD 30 MIN: CPT | Performed by: FAMILY MEDICINE

## 2022-03-15 PROCEDURE — 1036F TOBACCO NON-USER: CPT | Performed by: FAMILY MEDICINE

## 2022-03-15 PROCEDURE — G8482 FLU IMMUNIZE ORDER/ADMIN: HCPCS | Performed by: FAMILY MEDICINE

## 2022-03-15 RX ORDER — ASPIRIN 81 MG/1
TABLET ORAL
COMMUNITY

## 2022-03-15 RX ORDER — PROPRANOLOL HYDROCHLORIDE 80 MG/1
CAPSULE, EXTENDED RELEASE ORAL
Qty: 90 CAPSULE | Refills: 1 | Status: SHIPPED | OUTPATIENT
Start: 2022-03-15 | End: 2022-05-17

## 2022-03-15 RX ORDER — LISINOPRIL 30 MG/1
TABLET ORAL
Qty: 90 TABLET | Refills: 1 | Status: SHIPPED | OUTPATIENT
Start: 2022-03-15 | End: 2022-09-28 | Stop reason: SDUPTHER

## 2022-03-15 ASSESSMENT — PATIENT HEALTH QUESTIONNAIRE - PHQ9
SUM OF ALL RESPONSES TO PHQ QUESTIONS 1-9: 0
SUM OF ALL RESPONSES TO PHQ QUESTIONS 1-9: 0
2. FEELING DOWN, DEPRESSED OR HOPELESS: 0
SUM OF ALL RESPONSES TO PHQ QUESTIONS 1-9: 0
SUM OF ALL RESPONSES TO PHQ9 QUESTIONS 1 & 2: 0
1. LITTLE INTEREST OR PLEASURE IN DOING THINGS: 0
SUM OF ALL RESPONSES TO PHQ QUESTIONS 1-9: 0

## 2022-03-15 ASSESSMENT — ENCOUNTER SYMPTOMS
EYE REDNESS: 0
FREQUENT THROAT CLEARING: 0
ABDOMINAL PAIN: 0
VOMITING: 0
ORTHOPNEA: 0
TROUBLE SWALLOWING: 0
WHEEZING: 0
EYE DISCHARGE: 0
BLOOD IN STOOL: 0
SORE THROAT: 0
NAUSEA: 0
COUGH: 0
SHORTNESS OF BREATH: 0
DIFFICULTY BREATHING: 0
RHINORRHEA: 0
DIARRHEA: 0

## 2022-03-15 ASSESSMENT — COPD QUESTIONNAIRES: COPD: 1

## 2022-03-15 ASSESSMENT — LIFESTYLE VARIABLES: HOW OFTEN DO YOU HAVE A DRINK CONTAINING ALCOHOL: NEVER

## 2022-03-15 NOTE — PROGRESS NOTES
HPI Notes    Name: Marlee Gutierrez  : 1940        Chief Complaint:     Chief Complaint   Patient presents with    Medicare AWV    Hypertension    Hyperlipidemia    COPD    Depression    Lesion(s)       History of Present Illness:     Marlee Gutierrez is a 80 y.o.  male who presents with Medicare AWV, Hypertension, Hyperlipidemia, COPD, Depression, and Lesion(s)      Hypertension  This is a chronic problem. The current episode started more than 1 year ago. The problem is unchanged. The problem is controlled. Pertinent negatives include no chest pain, headaches, malaise/fatigue, orthopnea, palpitations, peripheral edema or shortness of breath. There are no associated agents to hypertension. Risk factors for coronary artery disease include dyslipidemia and male gender. The current treatment provides significant improvement. Hyperlipidemia  This is a chronic problem. The current episode started more than 1 year ago. The problem is controlled. Recent lipid tests were reviewed and are normal. He has no history of diabetes or hypothyroidism. Pertinent negatives include no chest pain or shortness of breath. Current antihyperlipidemic treatment includes statins. The current treatment provides significant improvement of lipids. Risk factors for coronary artery disease include dyslipidemia, hypertension and male sex. COPD  There is no cough, difficulty breathing, frequent throat clearing, shortness of breath or wheezing. This is a chronic problem. The current episode started more than 1 year ago. The problem has been unchanged. Pertinent negatives include no chest pain, fever, headaches, malaise/fatigue, postnasal drip, rhinorrhea, sore throat or trouble swallowing. His symptoms are alleviated by nothing. Risk factors: pt no longer smoking. His past medical history is significant for COPD. Depression - chronic but stable on the zoloft. Pt is sleeping well. Moods are good.  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 Non healing lesion on neck - pt has a lesion on the back of his neck that doesn't go away and also pt likes to pick at from time to time. The lesion doesn't bleed. Lesion does itch.   Past Medical History:     Past Medical History:   Diagnosis Date    Cancer ([facility]     left lower leg    COPD (chronic obstructive pulmonary disease) ([facility]     Hyperlipidemia     Hypertension     Migraine       Reviewed all health maintenance requirements and ordered appropriate tests  Health Maintenance Due   Topic Date Due    Shingles Vaccine (1 of 2) Never done       Past Surgical History:     Past Surgical History:   Procedure Laterality Date    EXCISION / BIOPSY SKIN LESION OF LEG Left 11/13/2017    LEG LESION BIOPSY EXCISION-EXCISION LEFT LEG LESION [facility] Christine Johnson MD [facility]  11/22/2017    LEG LESION BIOPSY EXCISION performed by Christine Johnson MD at [address] [place] [address] OFFICE/OUTPT VISIT,PROCEDURE ONLY Left 1/24/2018    LEG DEBRIDEMENT SKIN GRAFT--LEFT LEG DEBRIDEMENT WITH APLIGRAFT APPLICATION performed by Christine Johnson MD at [address] OFFICE/OUTPT [address] Left 2/1/2018    LEG ULCER DEBRIDEMENT APPLICATION OF APLIGRAF performed by Christine Johnson MD at [address] [address] OFFICE/OUTPT [address] [facility] Left 2/12/2018    LEG ULCER DEBRIDEMENT APPLICATION OF APLIGRAF----Application Apligraf ,Debridement Left Lower Leg Wound performed by Christine Johnson MD at [address] [address] OFFICE/OUTPT [address] [facility] Left 2/26/2018    LEG ULCER DEBRIDEMENT APPLICATION OF APLIGRAF performed by Christine Johnson MD at [address] [address] OFFICE/OUTPT [address] [facility] Left 3/12/2018    LEG ULCER DEBRIDEMENT APPLICATION OF NUSHIELD LEFT LOWER LEG performed by Christine Johnson MD at [address] [employer] [address] OFFICE/OUTPT VISIT,PROCEDURE ONLY Left 3/19/2018    LEG ULCER DEBRIDEMENT APPLICATION OF NUSHIELD performed by Charmaine Armando MD at [address] OFFICE/OUTPT [address] Left 3/26/2018    LEG ULCER DEBRIDEMENT APPLICATION OF NUSHIELD LEFT LOWER LEG performed by Charmaine Armando MD at [address] OFFICE/OUTPT [address] Left 4/2/2018    LEG ULCER DEBRIDEMENT APPLICATION OF Daniella Netter- LEFT LEG performed by Charmaine Armando MD at [address] OFFICE/OUTPT [address] Left 4/11/2018    LEG ULCER DEBRIDEMENT APPLICATION OF [place]HI[place]-LEFT LEG performed by Charmaine Armando MD at [address] OFFICE/OUTPT [address] Left 4/18/2018    LEG DEBRIDEMENT SKIN GRAFT- APPLICATION OF AFFINITY performed by Charmaine Armando MD at [address] Left 11/22/2017    SKIN GRAFT SPLIT THICKNESS performed by Charmaine Armando MD at [address] [facility]          Medications:       Prior to Admission medications    Medication Sig Start Date End Date Taking? Authorizing Provider   sertraline (ZOLOFT) 50 MG tablet Take 1 tablet by mouth once daily 3/15/22  Yes Merry Hernandez MD   propranolol (INDERAL LA) 80 MG extended release capsule Take 1 capsule by mouth once daily 3/15/22  Yes Merry Hernandez MD   lisinopril (PRINIVIL;ZESTRIL) 30 MG tablet Take 1 tablet by mouth once daily 3/15/22  Yes Merry Hernandez MD   lovastatin (MEVACOR) 40 MG tablet Take 1 tablet by mouth nightly 2/21/22  Yes Merry Hernandez MD   amLODIPine (NORVASC) 5 MG tablet Take 1 tablet by mouth twice daily 2/15/22  Yes Merry Hernandez MD   Multiple Vitamins-Minerals (EQ VISION FORMULA 50+) CAPS Take by mouth daily   Yes Historical Provider, MD   Multiple Vitamin (MULTI VITAMIN DAILY PO) Take by mouth daily   Yes Historical Provider, MD   Docusate Calcium (STOOL SOFTENER PO) Take  by mouth daily.    Yes Historical Provider, MD   aspirin (ASPIRIN ADULT LOW DOSE) 81 MG EC tablet 1 tablet    Historical Provider, MD   tiotropium (SPIRIVA HANDIHALER) 18 MCG inhalation capsule Inhale 1 capsule into the lungs daily  Patient not taking: Reported on 9/14/2021 3/12/21   Lola Alejo MD   albuterol (PROVENTIL) (2.5 MG/3ML) 0.083% nebulizer solution Take 3 mLs by nebulization every 6 hours as needed for Wheezing  Patient not taking: Reported on 3/3/2021 5/29/19   Dallis Ant A Jump, DO        Allergies:       Patient has no known allergies. Social History:     Tobacco:    reports that he quit smoking about 18 years ago. His smoking use included cigarettes. He started smoking about 63 years ago. He has a 216.00 pack-year smoking history. He has never used smokeless tobacco.  Alcohol:      reports current alcohol use of about 1.0 standard drink of alcohol per week. Drug Use:  reports no history of drug use. Family History:     History reviewed. No pertinent family history. Review of Systems:       Review of Systems   Constitutional: Negative for chills, fatigue, fever and malaise/fatigue. HENT: Negative for postnasal drip, rhinorrhea, sore throat and trouble swallowing. Eyes: Negative for discharge, redness and visual disturbance. Respiratory: Negative for cough, shortness of breath and wheezing. Cardiovascular: Negative for chest pain, palpitations, orthopnea and leg swelling. Gastrointestinal: Negative for abdominal pain, blood in stool, diarrhea, nausea and vomiting. Genitourinary: Negative for dysuria and hematuria. Musculoskeletal: Negative for joint swelling and neck stiffness. Skin: Negative for pallor and rash. Skin lesion on back of neck    Neurological: Negative for dizziness, facial asymmetry, light-headedness and headaches. Psychiatric/Behavioral: Negative for dysphoric mood and sleep disturbance. Physical Exam:     Physical Exam  Vitals reviewed. Constitutional:       General: He is not in acute distress. Appearance: He is well-developed. He is not ill-appearing. HENT:      Head: Normocephalic and atraumatic.    Eyes:      General: Right eye: No discharge. Left eye: No discharge. Conjunctiva/sclera: Conjunctivae normal.   Neck:      Thyroid: No thyromegaly. Vascular: No carotid bruit. Cardiovascular:      Rate and Rhythm: Normal rate and regular rhythm. Heart sounds: No murmur heard. Pulmonary:      Effort: Pulmonary effort is normal.      Breath sounds: Normal breath sounds. Abdominal:      General: Bowel sounds are normal. There is no distension. Palpations: Abdomen is soft. Tenderness: There is no abdominal tenderness. Musculoskeletal:      Cervical back: Neck supple. Right lower leg: No edema. Left lower leg: No edema. Skin:     General: Skin is warm and dry. Findings: No rash. Comments: A - mid posterior neck with 1cm circular tan nodule with rough surface   Neurological:      Mental Status: He is alert and oriented to person, place, and time. Vitals:  BP (!) 158/80   Pulse 60   Ht 5' 11\" (1.803 m)   Wt 214 lb (97.1 kg)   SpO2 96%   BMI 29.85 kg/m²       Data:     Lab Results   Component Value Date     02/28/2022    K 5.3 02/28/2022     02/28/2022    CO2 30 02/28/2022    BUN 17 02/28/2022    CREATININE 0.96 02/28/2022    GLUCOSE 131 02/28/2022    PROT 7.2 02/28/2022    LABALBU 4.6 02/28/2022    BILITOT 0.46 02/28/2022    ALKPHOS 83 02/28/2022    AST 17 02/28/2022    ALT 18 02/28/2022     Lab Results   Component Value Date    WBC 8.7 02/28/2022    RBC 5.11 02/28/2022    HGB 16.1 02/28/2022    HCT 49.6 02/28/2022    MCV 97.1 02/28/2022    MCH 31.5 02/28/2022    MCHC 32.5 02/28/2022    RDW 11.9 02/28/2022     02/28/2022    MPV 10.9 02/28/2022     Lab Results   Component Value Date    TSH 1.20 05/09/2018     Lab Results   Component Value Date    CHOL 165 02/28/2022    CHOL 142.0 03/08/2021    HDL 59 02/28/2022    PSA 0.73 05/04/2015    LABA1C 5.8 04/16/2019          Assessment/Plan:        1.  Essential hypertension, benign  Stable on inderal, zestril, norvasc   - propranolol (INDERAL LA) 80 MG extended release capsule; Take 1 capsule by mouth once daily  Dispense: 90 capsule; Refill: 1  - lisinopril (PRINIVIL;ZESTRIL) 30 MG tablet; Take 1 tablet by mouth once daily  Dispense: 90 tablet; Refill: 1    2. Pure hypercholesterolemia  Stable  On mevacor     3. Panlobular emphysema (HCC)  Stable and no longer smoking     4. Dysthymic disorder  Stable on zoloft  - sertraline (ZOLOFT) 50 MG tablet; Take 1 tablet by mouth once daily  Dispense: 90 tablet; Refill: 1    5. Non-healing skin lesion  Suggest pt come back for excision soon and can schedule with in the month        Sakshi Selby received counseling on the following healthy behaviors: nutrition and exercise  Reviewed prior labs and health maintenance  Continue current medications, diet and exercise. Discussed use, benefit, and side effects of prescribed medications. Barriers to medication compliance addressed. Patient given educational materials - see patient instructions  Was a self-tracking handout given in paper form or via [employer]? Yes    Requested Prescriptions     Signed Prescriptions Disp Refills    sertraline (ZOLOFT) 50 MG tablet 90 tablet 1     Sig: Take 1 tablet by mouth once daily    propranolol (INDERAL LA) 80 MG extended release capsule 90 capsule 1     Sig: Take 1 capsule by mouth once daily    lisinopril (PRINIVIL;ZESTRIL) 30 MG tablet 90 tablet 1     Sig: Take 1 tablet by mouth once daily       All patient questions answered. Patient voiced understanding. Quality Measures    Body mass index is 29.85 kg/m². Elevated. Weight control planned discussed Healthy diet and regular exercise. BP: (!) 158/80. Blood pressure is high. Treatment plan consists of No treatment change needed.     Fall Risk 3/15/2022 3/12/2021 3/12/2021 6/9/2020 4/16/2019 4/9/2018 10/15/2014   2 or more falls in past year? no no no no no no no   Fall with injury in past year? no no no no no no no     The patient does not have a history of falls. I did not - not indicated , complete a risk assessment for falls. A plan of care for falls No Treatment plan indicated    Lab Results   Component Value Date    LDLCALC 76 03/08/2021    LDLCHOLESTEROL 84 02/28/2022    (goal LDL reduction with dx if diabetes is 50% LDL reduction)    PHQ Scores 3/15/2022 3/12/2021 4/9/2018   PHQ2 Score 0 0 2   PHQ9 Score 0 0 2     Interpretation of Total Score Depression Severity: 1-4 = Minimal depression, 5-9 = Mild depression, 10-14 = Moderate depression, 15-19 = Moderately severe depression, 20-27 = Severe depression        Return in 6 months (on 9/15/2022) for Medicare Annual Wellness Visit in 1 year and 6mos ck up.       Electronically signed by Andreina Lopez MD on 3/15/2022 at 9:03 PM

## 2022-03-15 NOTE — PATIENT INSTRUCTIONS
Personalized Preventive Plan for [facility] - 3/15/2022  Medicare offers a range of preventive health benefits. Some of the tests and screenings are paid in full while other may be subject to a deductible, co-insurance, and/or copay. Some of these benefits include a comprehensive review of your medical history including lifestyle, illnesses that may run in your family, and various assessments and screenings as appropriate. After reviewing your medical record and screening and assessments performed today your provider may have ordered immunizations, labs, imaging, and/or referrals for you. A list of these orders (if applicable) as well as your Preventive Care list are included within your After Visit Summary for your review. Other Preventive Recommendations:    · A preventive eye exam performed by an eye specialist is recommended every 1-2 years to screen for glaucoma; cataracts, macular degeneration, and other eye disorders. · A preventive dental visit is recommended every 6 months. · Try to get at least 150 minutes of exercise per week or 10,000 steps per day on a pedometer . · Order or download the FREE \"Exercise & Physical Activity: Your Everyday Guide\" from [employer]. Call 5-227.946.6829 or search [employer] online. · You need 5927-1825 mg of calcium and 6233-7141 IU of vitamin D per day. It is possible to meet your calcium requirement with diet alone, but a vitamin D supplement is usually necessary to meet this goal.  · When exposed to the sun, use a sunscreen that protects against both UVA and UVB radiation with an SPF of 30 or greater. Reapply every 2 to 3 hours or after sweating, drying off with a towel, or swimming. · Always wear a seat belt when traveling in a car. Always wear a helmet when riding a bicycle or motorcycle.

## 2022-03-15 NOTE — PROGRESS NOTES
Medicare Annual Wellness Visit    [facility] is here for Medicare AWV, Hypertension, Hyperlipidemia, COPD, Depression, and Lesion(s)    Assessment & Plan   Pure hypercholesterolemia  Essential hypertension, benign  -     propranolol (INDERAL LA) 80 MG extended release capsule; Take 1 capsule by mouth once daily, Disp-90 capsule, R-1Normal  -     lisinopril (PRINIVIL;ZESTRIL) 30 MG tablet; Take 1 tablet by mouth once daily, Disp-90 tablet, R-1Normal  Panlobular emphysema (HCC)  Dysthymic disorder  -     sertraline (ZOLOFT) 50 MG tablet; Take 1 tablet by mouth once daily, Disp-90 tablet, R-1Normal  Non-healing skin lesion  Medicare annual wellness visit, subsequent      Recommendations for Preventive Services Due: see orders and patient instructions/AVS.  Recommended screening schedule for the next 5-10 years is provided to the patient in written form: see Patient Instructions/AVS.     Return in about 6 months (around 9/15/2022) for Hyperlipidemia, depression, COPD. Subjective   The following acute and/or chronic problems were also addressed today:see other note    Patient's complete Health Risk Assessment and screening values have been reviewed and are found in Flowsheets. The following problems were reviewed today and where indicated follow up appointments were made and/or referrals ordered.     Positive Risk Factor Screenings with Interventions:             General Health and ACP:  General  In general, how would you say your health is?: Good  In the past 7 days, have you experienced any of the following: New or Increased Pain, New or Increased Fatigue, Loneliness, Social Isolation, Stress or Anger?: No  Do you get the social and emotional support that you need?: Yes  Do you have a Living Will?: Yes    Advance Directives     Power of  Living Will ACP-Advance Directive ACP-Power of     Not on File Not on File Not on File Not on File      General Health Risk Interventions:  · doing well and work on diet and exercise this summer    Health Habits/Nutrition:     Physical Activity: Inactive    Days of Exercise per Week: 0 days    Minutes of Exercise per Session: 0 min     Have you lost any weight without trying in the past 3 months?: No  Body mass index: (!) 29.84  Have you seen the dentist within the past year?: N/A - wear dentures    Health Habits/Nutrition Interventions:  · Nutritional issues:  pt will be more busy working this summer             Objective   Vitals:    03/15/22 1328 03/15/22 1335 03/15/22 1412   BP: (!) 160/80 (!) 170/80 (!) 158/80   Site: Left Upper Arm Right Upper Arm    Cuff Size: Medium Adult Medium Adult    Pulse: 60     SpO2: 96%     Weight: 214 lb (97.1 kg)     Height: 5' 11\" (1.803 m)        Body mass index is 29.85 kg/m². No Known Allergies  Prior to Visit Medications    Medication Sig Taking? Authorizing Provider   sertraline (ZOLOFT) 50 MG tablet Take 1 tablet by mouth once daily Yes Chun Walter MD   propranolol (INDERAL LA) 80 MG extended release capsule Take 1 capsule by mouth once daily Yes Chun Walter MD   lisinopril (PRINIVIL;ZESTRIL) 30 MG tablet Take 1 tablet by mouth once daily Yes Chun Walter MD   lovastatin (MEVACOR) 40 MG tablet Take 1 tablet by mouth nightly Yes Chun Walter MD   amLODIPine (NORVASC) 5 MG tablet Take 1 tablet by mouth twice daily Yes Chun Walter MD   Multiple Vitamins-Minerals (EQ VISION FORMULA 50+) CAPS Take by mouth daily Yes Historical Provider, MD   Multiple Vitamin (MULTI VITAMIN DAILY PO) Take by mouth daily Yes Historical Provider, MD   Docusate Calcium (STOOL SOFTENER PO) Take  by mouth daily.  Yes Historical Provider, MD   aspirin (ASPIRIN ADULT LOW DOSE) 81 MG EC tablet 1 tablet  Historical Provider, MD   tiotropium (SPIRIVA HANDIHALER) 18 MCG inhalation capsule Inhale 1 capsule into the lungs daily  Patient not taking: Reported on 9/14/2021  Chun Walter MD   albuterol (PROVENTIL) (2.5 MG/3ML) 0.083% nebulizer solution Take 3 mLs by nebulization every 6 hours as needed for Wheezing  Patient not taking: Reported on 3/3/2021  Alcon Carey DO   SUMAtriptan (IMITREX) 25 MG tablet Take 1 tablet by mouth once as needed for Migraine  Patient not taking: Reported on 6/3/2020  LEDY Blake - CNP       CareTeam (Including outside providers/suppliers regularly involved in providing care):   Patient Care Team:  Lola Alejo MD as PCP - General (Family Medicine)  Lola Alejo MD as PCP - [facility] Empaneled Provider  Edith Constantino MD as Consulting Physician (General Surgery)    Reviewed and updated this visit:  Tobacco  Allergies  Meds  Problems  Med Hx  Surg Hx  Soc Hx  Fam Hx

## 2022-03-28 DIAGNOSIS — F34.1 DYSTHYMIC DISORDER: ICD-10-CM

## 2022-03-28 NOTE — TELEPHONE ENCOUNTER
Last OV: 3/15/2022 AWV   Last RX:    Next scheduled apt: 6 month chronic      surescript requesting a refill

## 2022-04-13 DIAGNOSIS — I10 ESSENTIAL HYPERTENSION, BENIGN: ICD-10-CM

## 2022-04-13 RX ORDER — LISINOPRIL 30 MG/1
TABLET ORAL
Qty: 90 TABLET | Refills: 0 | OUTPATIENT
Start: 2022-04-13

## 2022-05-17 DIAGNOSIS — I10 ESSENTIAL HYPERTENSION, BENIGN: ICD-10-CM

## 2022-05-17 RX ORDER — PROPRANOLOL HYDROCHLORIDE 80 MG/1
CAPSULE, EXTENDED RELEASE ORAL
Qty: 90 CAPSULE | Refills: 1 | Status: SHIPPED | OUTPATIENT
Start: 2022-05-17

## 2022-05-17 NOTE — TELEPHONE ENCOUNTER
Last OV: 3/15/2022 AWV   Last RX:    Next scheduled apt: 9/28/2022 6 month chronic        surescript requesting a refill

## 2022-07-11 ENCOUNTER — OFFICE VISIT (OUTPATIENT)
Dept: FAMILY MEDICINE CLINIC | Age: 82
End: 2022-07-11
Payer: MEDICARE

## 2022-07-11 VITALS
SYSTOLIC BLOOD PRESSURE: 180 MMHG | BODY MASS INDEX: 29.43 KG/M2 | DIASTOLIC BLOOD PRESSURE: 84 MMHG | HEART RATE: 60 BPM | OXYGEN SATURATION: 98 % | WEIGHT: 211 LBS

## 2022-07-11 DIAGNOSIS — I10 ESSENTIAL HYPERTENSION, BENIGN: Primary | ICD-10-CM

## 2022-07-11 DIAGNOSIS — F41.9 ANXIETY: ICD-10-CM

## 2022-07-11 PROCEDURE — 1123F ACP DISCUSS/DSCN MKR DOCD: CPT | Performed by: FAMILY MEDICINE

## 2022-07-11 PROCEDURE — G8417 CALC BMI ABV UP PARAM F/U: HCPCS | Performed by: FAMILY MEDICINE

## 2022-07-11 PROCEDURE — G8427 DOCREV CUR MEDS BY ELIG CLIN: HCPCS | Performed by: FAMILY MEDICINE

## 2022-07-11 PROCEDURE — 1036F TOBACCO NON-USER: CPT | Performed by: FAMILY MEDICINE

## 2022-07-11 PROCEDURE — 99213 OFFICE O/P EST LOW 20 MIN: CPT | Performed by: FAMILY MEDICINE

## 2022-07-11 RX ORDER — LOVASTATIN 40 MG/1
40 TABLET ORAL NIGHTLY
Qty: 90 TABLET | Refills: 1 | Status: SHIPPED | OUTPATIENT
Start: 2022-07-11

## 2022-07-11 RX ORDER — AMLODIPINE BESYLATE 5 MG/1
TABLET ORAL
Qty: 180 TABLET | Refills: 1 | Status: SHIPPED | OUTPATIENT
Start: 2022-07-11

## 2022-07-11 ASSESSMENT — ENCOUNTER SYMPTOMS
COUGH: 0
EYE REDNESS: 0
DIARRHEA: 0
EYE DISCHARGE: 0
SHORTNESS OF BREATH: 0
VOMITING: 0

## 2022-07-11 NOTE — PROGRESS NOTES
HPI Notes    Name: Dee Quintero  : 1940        Chief Complaint:     Chief Complaint   Patient presents with    Hypertension     Pt presents for ER follow up. Pt went to [facility] ER on 22 for c/o high BP. Pt states he hadn't taken his Zoloft in 5 days. Pt given refill for Zoloft. History of Present Illness:     Dee Quintero is a 80 y.o.  male who presents with Hypertension (Pt presents for ER follow up. Pt went to [facility] ER on 22 for c/o high BP. Pt states he hadn't taken his Zoloft in 5 days. Pt given refill for Zoloft. )      Hypertension  This is a chronic problem. The current episode started more than 1 year ago. The problem has been gradually improving since onset. Condition status: Pt was haivng anxiety off zoloft and Not taking the norvasc BID so in ER last week BP 200s/90s in ER. pt now back on the zoloft and Norvasc at on BID. BP at home now 112-134/50-60s BUT today pt has NOT taken BP medication. Associated symptoms include anxiety. Pertinent negatives include no chest pain, malaise/fatigue, palpitations, peripheral edema or shortness of breath. There are no associated agents to hypertension. Risk factors for coronary artery disease include male gender. The current treatment provides moderate improvement. Anxiety - pt had accidentally ran out of zoloft and then forgot to refill so out of zoloft for 4-5d. Pt states he got very \"fired up\". Pt was very anxious \"worked up\"  and just couldn't relax so went to the [facility] ER. Pt had been on the zoloft for years and now knows he needs to stay on the medication. So pt is back on the zoloft and able to relax better. Sleeping much better and feeling better overall.      Past Medical History:     Past Medical History:   Diagnosis Date    Cancer [facility])     left lower leg    COPD (chronic obstructive pulmonary disease) (HCC)     Hyperlipidemia     Hypertension     Migraine       Reviewed all health maintenance requirements and ordered appropriate tests  Health Maintenance Due   Topic Date Due    Shingles vaccine (1 of 2) Never done       Past Surgical History:     Past Surgical History:   Procedure Laterality Date    EXCISION / BIOPSY SKIN LESION OF LEG Left 11/13/2017    LEG LESION BIOPSY EXCISION-EXCISION LEFT LEG LESION performed by Steven Kapaida MD at [facility] [address] OF LEG  11/22/2017    LEG LESION BIOPSY EXCISION performed by Steven Kapadia MD at [address] [facility] OFFICE/OUTPT VISIT,PROCEDURE ONLY Left 1/24/2018    LEG DEBRIDEMENT SKIN GRAFT--LEFT LEG DEBRIDEMENT WITH APLIGRAFT APPLICATION performed by Steven Kapadia MD at [address] OFFICE/OUTPT [address] Left 2/1/2018    LEG ULCER DEBRIDEMENT APPLICATION OF APLIGRAF performed by Steven Kapadia MD at [address] OFFICE/OUTPT [address] Left 2/12/2018    LEG ULCER DEBRIDEMENT APPLICATION OF APLIGRAF----Application Apligraf ,Debridement Left Lower Leg Wound performed by Steven Kapadia MD at [address] OFFICE/OUTPT [address] Left 2/26/2018    LEG ULCER DEBRIDEMENT APPLICATION OF APLIGRAF performed by Steven Kapadia MD at [address] OFFICE/OUTPT [address] Left 3/12/2018    LEG ULCER DEBRIDEMENT APPLICATION OF NUSHIELD LEFT LOWER LEG performed by Steven Kapadia MD at [address] OFFICE/OUTPT [address] Left 3/19/2018    LEG ULCER DEBRIDEMENT APPLICATION OF NUSHIELD performed by Steven Kapadia MD at [address] OFFICE/OUTPT [address] Left 3/26/2018    LEG ULCER DEBRIDEMENT APPLICATION OF NUSHIELD LEFT LOWER LEG performed by Steven Kapadia MD at [address] OFFICE/OUTPT [address] Left 4/2/2018    LEG ULCER DEBRIDEMENT APPLICATION OF NUSHIELD- LEFT LEG performed by Steven Kapadia MD at [address] OFFICE/OUTPT VISIT,PROCEDURE ONLY Left 4/11/2018    LEG ULCER DEBRIDEMENT APPLICATION OF NUSHIELD-LEFT LEG performed by Juli Eric MD at [address] [facility] OFFICE/OUTPT [address] [facility] Left 4/18/2018    LEG DEBRIDEMENT SKIN GRAFT- APPLICATION OF AFFINITY performed by Juli Eric MD at [address] Left 11/22/2017    SKIN GRAFT SPLIT THICKNESS performed by Juli Eric MD at [address] [facility]          Medications:       Prior to Admission medications    Medication Sig Start Date End Date Taking? Authorizing Provider   amLODIPine (NORVASC) 5 MG tablet Take 1 tablet by mouth twice daily 7/11/22  Yes Erich Vieyra MD   lovastatin (MEVACOR) 40 MG tablet Take 1 tablet by mouth nightly 7/11/22  Yes Erich Vieyra MD   propranolol (INDERAL LA) 80 MG extended release capsule Take 1 capsule by mouth once daily 5/17/22  Yes Erich Vieyra MD   sertraline (ZOLOFT) 50 MG tablet Take 1 tablet by mouth once daily 3/28/22  Yes Erich Vieyra MD   aspirin (ASPIRIN ADULT LOW DOSE) 81 MG EC tablet 1 tablet   Yes Historical Provider, MD   lisinopril (PRINIVIL;ZESTRIL) 30 MG tablet Take 1 tablet by mouth once daily 3/15/22  Yes Erich Vieyra MD   Multiple Vitamin (MULTI VITAMIN DAILY PO) Take by mouth daily   Yes Historical Provider, MD   Docusate Calcium (STOOL SOFTENER PO) Take  by mouth daily. Yes Historical Provider, MD   tiotropium (Billye Glass) 18 MCG inhalation capsule Inhale 1 capsule into the lungs daily  Patient not taking: Reported on 9/14/2021 3/12/21   Erich Vieyra MD   albuterol (PROVENTIL) (2.5 MG/3ML) 0.083% nebulizer solution Take 3 mLs by nebulization every 6 hours as needed for Wheezing  Patient not taking: Reported on 3/3/2021 5/29/19   Alonzo Jones A Cherry, DO   Multiple Vitamins-Minerals (EQ VISION FORMULA 50+) CAPS Take by mouth daily  Patient not taking: Reported on 7/11/2022    Historical Provider, MD        Allergies:       Patient has no known allergies. Social History:     Tobacco:    reports that he quit smoking about 18 years ago.  His smoking use included cigarettes. He started smoking about 63 years ago. He has a 216.00 pack-year smoking history. He has never used smokeless tobacco.  Alcohol:      reports current alcohol use of about 1.0 standard drink of alcohol per week. Drug Use:  reports no history of drug use. Family History:     History reviewed. No pertinent family history. Review of Systems:       Review of Systems   Constitutional: Negative for chills, fever and malaise/fatigue. Eyes: Negative for discharge and redness. Respiratory: Negative for cough and shortness of breath. Cardiovascular: Negative for chest pain, palpitations and leg swelling. Gastrointestinal: Negative for diarrhea and vomiting. Skin: Negative for rash. Neurological: Negative for dizziness and facial asymmetry. Physical Exam:     Physical Exam  Vitals reviewed. Constitutional:       General: He is not in acute distress. Appearance: Normal appearance. He is well-developed. He is not ill-appearing. HENT:      Head: Normocephalic and atraumatic. Eyes:      General:         Right eye: No discharge. Left eye: No discharge. Conjunctiva/sclera: Conjunctivae normal.   Neck:      Thyroid: No thyromegaly. Cardiovascular:      Rate and Rhythm: Normal rate and regular rhythm. Heart sounds: No murmur heard. Pulmonary:      Effort: Pulmonary effort is normal.      Breath sounds: Normal breath sounds. Abdominal:      General: There is no distension. Palpations: Abdomen is soft. Tenderness: There is no abdominal tenderness. Musculoskeletal:      Cervical back: Neck supple. Skin:     General: Skin is warm and dry. Findings: No rash. Neurological:      Mental Status: He is alert and oriented to person, place, and time.          Vitals:  BP (!) 180/84 (Site: Left Upper Arm, Cuff Size: Medium Adult)   Pulse 60   Wt 211 lb (95.7 kg)   SpO2 98%   BMI 29.43 kg/m²       Data:     Lab Results   Component Value Date/Time  02/28/2022 09:41 AM    K 5.3 02/28/2022 09:41 AM     02/28/2022 09:41 AM    CO2 30 02/28/2022 09:41 AM    BUN 17 02/28/2022 09:41 AM    CREATININE 0.96 02/28/2022 09:41 AM    GLUCOSE 131 02/28/2022 09:41 AM    PROT 7.2 02/28/2022 09:41 AM    LABALBU 4.6 02/28/2022 09:41 AM    BILITOT 0.46 02/28/2022 09:41 AM    ALKPHOS 83 02/28/2022 09:41 AM    AST 17 02/28/2022 09:41 AM    ALT 18 02/28/2022 09:41 AM     Lab Results   Component Value Date/Time    WBC 8.7 02/28/2022 09:41 AM    RBC 5.11 02/28/2022 09:41 AM    HGB 16.1 02/28/2022 09:41 AM    HCT 49.6 02/28/2022 09:41 AM    MCV 97.1 02/28/2022 09:41 AM    MCH 31.5 02/28/2022 09:41 AM    MCHC 32.5 02/28/2022 09:41 AM    RDW 11.9 02/28/2022 09:41 AM     02/28/2022 09:41 AM    MPV 10.9 02/28/2022 09:41 AM     Lab Results   Component Value Date/Time    TSH 1.20 05/09/2018 09:07 AM     Lab Results   Component Value Date/Time    CHOL 165 02/28/2022 09:42 AM    CHOL 142.0 03/08/2021 12:00 AM    HDL 59 02/28/2022 09:42 AM    PSA 0.73 05/04/2015 08:41 AM    LABA1C 5.8 04/16/2019 01:57 PM          Assessment/Plan:        1. Essential hypertension, benign  Stay on the INCREASE off the norvasc 5mg one BID --- take BP at home several days a week to monitor and try to take his BP earlier rather than later in AM.   - amLODIPine (NORVASC) 5 MG tablet; Take 1 tablet by mouth twice daily  Dispense: 180 tablet; Refill: 1    2. Anxiety  Stay on the zoloft         Lazaro received counseling on the following healthy behaviors: nutrition and exercise  Reviewed prior labs and health maintenance  Continue current medications, diet and exercise. Discussed use, benefit, and side effects of prescribed medications. Barriers to medication compliance addressed. Patient given educational materials - see patient instructions  Was a self-tracking handout given in paper form or via [employer]?  Yes    Requested Prescriptions     Signed Prescriptions Disp Refills    amLODIPine (NORVASC) 5 MG tablet 180 tablet 1     Sig: Take 1 tablet by mouth twice daily    lovastatin (MEVACOR) 40 MG tablet 90 tablet 1     Sig: Take 1 tablet by mouth nightly       All patient questions answered. Patient voiced understanding. Quality Measures    Body mass index is 29.43 kg/m². No Treatment plan indicated. Weight control planned discussed Healthy diet and regular exercise. BP: (!) 180/84. Blood pressure is high. Treatment plan consists of Patient In-home Blood Pressure Monitoring and Norvasc at one BID . Fall Risk 3/15/2022 3/12/2021 3/12/2021 6/9/2020 4/16/2019 4/9/2018 10/15/2014   2 or more falls in past year? no no no no no no no   Fall with injury in past year? no no no no no no no     The patient does not have a history of falls. I did not - not indicated , complete a risk assessment for falls. A plan of care for falls No Treatment plan indicated    Lab Results   Component Value Date    LDLCALC 76 03/08/2021    LDLCHOLESTEROL 84 02/28/2022    (goal LDL reduction with dx if diabetes is 50% LDL reduction)    PHQ Scores 3/15/2022 3/12/2021 4/9/2018   PHQ2 Score 0 0 2   PHQ9 Score 0 0 2     Interpretation of Total Score Depression Severity: 1-4 = Minimal depression, 5-9 = Mild depression, 10-14 = Moderate depression, 15-19 = Moderately severe depression, 20-27 = Severe depression        Return for keep Sept for his check up.       Electronically signed by Isabell Guillen MD on 7/11/2022 at 8:06 AM

## 2022-09-28 ENCOUNTER — OFFICE VISIT (OUTPATIENT)
Dept: FAMILY MEDICINE CLINIC | Age: 82
End: 2022-09-28
Payer: MEDICARE

## 2022-09-28 VITALS
BODY MASS INDEX: 29.82 KG/M2 | OXYGEN SATURATION: 96 % | DIASTOLIC BLOOD PRESSURE: 80 MMHG | HEIGHT: 71 IN | HEART RATE: 62 BPM | SYSTOLIC BLOOD PRESSURE: 170 MMHG | WEIGHT: 213 LBS

## 2022-09-28 DIAGNOSIS — F34.1 DYSTHYMIC DISORDER: ICD-10-CM

## 2022-09-28 DIAGNOSIS — E78.00 PURE HYPERCHOLESTEROLEMIA: ICD-10-CM

## 2022-09-28 DIAGNOSIS — J43.1 PANLOBULAR EMPHYSEMA (HCC): ICD-10-CM

## 2022-09-28 DIAGNOSIS — I10 ESSENTIAL HYPERTENSION, BENIGN: Primary | ICD-10-CM

## 2022-09-28 DIAGNOSIS — F41.9 ANXIETY AND DEPRESSION: ICD-10-CM

## 2022-09-28 DIAGNOSIS — F32.A ANXIETY AND DEPRESSION: ICD-10-CM

## 2022-09-28 PROCEDURE — 3023F SPIROM DOC REV: CPT | Performed by: FAMILY MEDICINE

## 2022-09-28 PROCEDURE — 99214 OFFICE O/P EST MOD 30 MIN: CPT | Performed by: FAMILY MEDICINE

## 2022-09-28 PROCEDURE — 1036F TOBACCO NON-USER: CPT | Performed by: FAMILY MEDICINE

## 2022-09-28 PROCEDURE — G8417 CALC BMI ABV UP PARAM F/U: HCPCS | Performed by: FAMILY MEDICINE

## 2022-09-28 PROCEDURE — 1123F ACP DISCUSS/DSCN MKR DOCD: CPT | Performed by: FAMILY MEDICINE

## 2022-09-28 PROCEDURE — G8427 DOCREV CUR MEDS BY ELIG CLIN: HCPCS | Performed by: FAMILY MEDICINE

## 2022-09-28 RX ORDER — LISINOPRIL 30 MG/1
TABLET ORAL
Qty: 90 TABLET | Refills: 1 | Status: SHIPPED | OUTPATIENT
Start: 2022-09-28

## 2022-09-28 SDOH — ECONOMIC STABILITY: FOOD INSECURITY: WITHIN THE PAST 12 MONTHS, THE FOOD YOU BOUGHT JUST DIDN'T LAST AND YOU DIDN'T HAVE MONEY TO GET MORE.: NEVER TRUE

## 2022-09-28 SDOH — ECONOMIC STABILITY: FOOD INSECURITY: WITHIN THE PAST 12 MONTHS, YOU WORRIED THAT YOUR FOOD WOULD RUN OUT BEFORE YOU GOT MONEY TO BUY MORE.: NEVER TRUE

## 2022-09-28 ASSESSMENT — PATIENT HEALTH QUESTIONNAIRE - PHQ9
6. FEELING BAD ABOUT YOURSELF - OR THAT YOU ARE A FAILURE OR HAVE LET YOURSELF OR YOUR FAMILY DOWN: 0
8. MOVING OR SPEAKING SO SLOWLY THAT OTHER PEOPLE COULD HAVE NOTICED. OR THE OPPOSITE, BEING SO FIGETY OR RESTLESS THAT YOU HAVE BEEN MOVING AROUND A LOT MORE THAN USUAL: 0
SUM OF ALL RESPONSES TO PHQ QUESTIONS 1-9: 0
SUM OF ALL RESPONSES TO PHQ QUESTIONS 1-9: 0
3. TROUBLE FALLING OR STAYING ASLEEP: 0
5. POOR APPETITE OR OVEREATING: 0
2. FEELING DOWN, DEPRESSED OR HOPELESS: 0
4. FEELING TIRED OR HAVING LITTLE ENERGY: 0
1. LITTLE INTEREST OR PLEASURE IN DOING THINGS: 0
9. THOUGHTS THAT YOU WOULD BE BETTER OFF DEAD, OR OF HURTING YOURSELF: 0
SUM OF ALL RESPONSES TO PHQ QUESTIONS 1-9: 0
SUM OF ALL RESPONSES TO PHQ9 QUESTIONS 1 & 2: 0
SUM OF ALL RESPONSES TO PHQ QUESTIONS 1-9: 0
7. TROUBLE CONCENTRATING ON THINGS, SUCH AS READING THE NEWSPAPER OR WATCHING TELEVISION: 0

## 2022-09-28 ASSESSMENT — ENCOUNTER SYMPTOMS
DIARRHEA: 0
SORE THROAT: 0
EYE DISCHARGE: 0
CONSTIPATION: 0
TROUBLE SWALLOWING: 0
COUGH: 0
DIFFICULTY BREATHING: 0
BLOOD IN STOOL: 0
NAUSEA: 0
ABDOMINAL PAIN: 0
EYE REDNESS: 0
VOMITING: 0
FREQUENT THROAT CLEARING: 0
SHORTNESS OF BREATH: 0
HEMOPTYSIS: 0

## 2022-09-28 ASSESSMENT — SOCIAL DETERMINANTS OF HEALTH (SDOH): HOW HARD IS IT FOR YOU TO PAY FOR THE VERY BASICS LIKE FOOD, HOUSING, MEDICAL CARE, AND HEATING?: NOT VERY HARD

## 2022-09-28 ASSESSMENT — COPD QUESTIONNAIRES: COPD: 1

## 2022-09-28 NOTE — PROGRESS NOTES
HPI Notes    Name: Son Rose Nims  : 1940        Chief Complaint:     Chief Complaint   Patient presents with    Hypertension     6 month check up. Patient decreased amlodipine to 1 times daily instead of 2 d/t low bp    Hyperlipidemia    COPD    Mental Health Problem       History of Present Illness:     Lexa Barry is a 80 y.o.  male who presents with Hypertension (6 month check up. Patient decreased amlodipine to 1 times daily instead of 2 d/t low bp), Hyperlipidemia, COPD, and Mental Health Problem      Hypertension  This is a chronic problem. The current episode started more than 1 year ago. The problem is unchanged. Condition status: pt states -140s/ 60-70s when he takes it. Pt states he decreased the Norvasc from 2 a day to 1 a day b/c on 2 a day his BP went to low and he felt \"really worn out\". Pertinent negatives include no chest pain, headaches, malaise/fatigue, neck pain, palpitations, peripheral edema or shortness of breath. There are no associated agents to hypertension. Risk factors for coronary artery disease include dyslipidemia and male gender. The current treatment provides significant improvement. Hyperlipidemia  This is a chronic problem. The current episode started more than 1 year ago. The problem is controlled. Recent lipid tests were reviewed and are normal. He has no history of diabetes or hypothyroidism. Pertinent negatives include no chest pain or shortness of breath. Current antihyperlipidemic treatment includes statins. The current treatment provides significant improvement of lipids. Risk factors for coronary artery disease include dyslipidemia, hypertension and male sex. COPD  There is no cough, difficulty breathing, frequent throat clearing, hemoptysis or shortness of breath. Primary symptoms comments: Pt states he gets \"winded at times\". Pt QUIT smoking 20yrs ago. . This is a chronic problem. The current episode started more than 1 year ago.  The problem has been unchanged. Pertinent negatives include no chest pain, fever, headaches, malaise/fatigue, sore throat or trouble swallowing. Relieved by: spiriva as needes. Risk factors for lung disease include smoking/tobacco exposure. His past medical history is significant for COPD. Mental Health Problem  The primary symptoms do not include dysphoric mood or delusions. Primary symptoms comment: Pt has been on zolloft for years. . The current episode started more than 1 month ago. This is a chronic problem. The degree of incapacity that he is experiencing as a consequence of his illness is mild. Additional symptoms of the illness do not include fatigue, headaches or abdominal pain.      Past Medical History:     Past Medical History:   Diagnosis Date    Cancer ([facility] [address]     left lower leg    COPD (chronic obstructive pulmonary disease) ([facility]     Hyperlipidemia     Hypertension     Migraine       Reviewed all health maintenance requirements and ordered appropriate tests  Health Maintenance Due   Topic Date Due    Shingles vaccine (1 of 2) Never done       Past Surgical History:     Past Surgical History:   Procedure Laterality Date    EXCISION / BIOPSY SKIN LESION OF LEG Left 11/13/2017    LEG LESION BIOPSY EXCISION-EXCISION LEFT LEG LESION performed by Violet Vela MD at [address] / [address] OF LEG  11/22/2017    LEG LESION BIOPSY EXCISION performed by Violet Vela MD at [address] [place] [address] OFFICE/OUTPT VISIT,PROCEDURE ONLY Left 1/24/2018    LEG DEBRIDEMENT SKIN GRAFT--LEFT LEG DEBRIDEMENT WITH APLIGRAFT APPLICATION performed by Violet Vela MD at [address] OFFICE/OUTPT [address] [facility] [address] Left 2/1/2018    LEG ULCER DEBRIDEMENT APPLICATION OF APLIGRAF performed by Violet Vela MD at [address] OFFICE/OUTPT [address] [facility] Left 2/12/2018    LEG ULCER DEBRIDEMENT APPLICATION OF APLIGRAF----Application Apligraf ,Debridement Left Lower Leg Wound performed by Jax Albright MD at [address] [place] [address] Dr OFFICE/OUTPT [address] Left 2/26/2018    LEG ULCER DEBRIDEMENT APPLICATION OF APLIGRAF performed by Jax Albright MD at [address] [place] [address] Dr OFFICE/OUTPT [address] [facility] [address] Left 3/12/2018    LEG ULCER DEBRIDEMENT APPLICATION OF NUSHIELD LEFT LOWER LEG performed by Jax Albright MD at [address] [place] [address] Dr OFFICE/OUTPT [address] Left 3/19/2018    LEG ULCER DEBRIDEMENT APPLICATION OF NUSHIELD performed by Jax Albright MD at [address] [place] [address] Dr OFFICE/OUTPT [address] Left 3/26/2018    LEG ULCER DEBRIDEMENT APPLICATION OF NUSHIELD LEFT LOWER LEG performed by Jax Albright MD at [address] [place] [address] Dr OFFICE/OUTPT [address] Left 4/2/2018    LEG ULCER DEBRIDEMENT APPLICATION OF [place]- LEFT LEG performed by Jax Albright MD at [address] [place] [address] Dr OFFICE/OUTPT [address] [facility] [address] Left 4/11/2018    LEG ULCER DEBRIDEMENT APPLICATION OF NUSHIELD-LEFT LEG performed by Jax Albright MD at [address] [place] [address] Dr OFFICE/OUTPT [address] [facility] [address] Left 4/18/2018    LEG DEBRIDEMENT SKIN GRAFT- APPLICATION OF AFFINITY performed by Jax Albright MD at [address] [place] [address] Left 11/22/2017    SKIN GRAFT SPLIT THICKNESS performed by Jax Albright MD at [address] [facility]          Medications:       Prior to Admission medications    Medication Sig Start Date End Date Taking?  Authorizing Provider   amLODIPine (NORVASC) 5 MG tablet Take 1 tablet by mouth twice daily  Patient taking differently: daily Take 1 tablet by mouth twice daily 7/11/22  Yes Nelson Castro MD   lovastatin (MEVACOR) 40 MG tablet Take 1 tablet by mouth nightly 7/11/22  Yes Nelson Castro MD   propranolol (INDERAL LA) 80 MG extended release capsule Take 1 capsule by mouth once daily 5/17/22  Yes Nelson Castro MD   sertraline (ZOLOFT) 50 MG tablet Take 1 tablet by mouth once daily 3/28/22  Yes Nelson Castro MD   aspirin 81 MG EC tablet 1 tablet   Yes Historical Provider, MD lisinopril (PRINIVIL;ZESTRIL) 30 MG tablet Take 1 tablet by mouth once daily 3/15/22  Yes Dakota Damon MD   Multiple Vitamin (MULTI VITAMIN DAILY PO) Take by mouth daily   Yes Historical Provider, MD   tiotropium (Mondragon Begin) 18 MCG inhalation capsule Inhale 1 capsule into the lungs daily  Patient not taking: Reported on 9/14/2021 3/12/21   Dakota Damon MD   albuterol (PROVENTIL) (2.5 MG/3ML) 0.083% nebulizer solution Take 3 mLs by nebulization every 6 hours as needed for Wheezing  Patient not taking: Reported on 3/3/2021 5/29/19   Ashwini Loveless A Jump, DO   Multiple Vitamins-Minerals (EQ VISION FORMULA 50+) CAPS Take by mouth daily  Patient not taking: Reported on 7/11/2022    Historical Provider, MD   Docusate Calcium (STOOL SOFTENER PO) Take  by mouth daily. Historical Provider, MD        Allergies:       Patient has no known allergies. Social History:     Tobacco:    reports that he quit smoking about 19 years ago. His smoking use included cigarettes. He started smoking about 63 years ago. He has a 216.00 pack-year smoking history. He has never used smokeless tobacco.  Alcohol:      reports current alcohol use of about 1.0 standard drink per week. Drug Use:  reports no history of drug use. Family History:     History reviewed. No pertinent family history. Review of Systems:       Review of Systems   Constitutional:  Negative for chills, fatigue, fever and malaise/fatigue. HENT:  Negative for sore throat and trouble swallowing. Eyes:  Negative for discharge, redness and visual disturbance. Respiratory:  Negative for cough, hemoptysis and shortness of breath. Cardiovascular:  Negative for chest pain, palpitations and leg swelling. Gastrointestinal:  Negative for abdominal pain, blood in stool, constipation, diarrhea, nausea and vomiting. Genitourinary:  Negative for dysuria and hematuria. Musculoskeletal:  Negative for joint swelling and neck pain. Skin:  Negative for rash. Lab Results   Component Value Date/Time    TSH 1.20 05/09/2018 09:07 AM     Lab Results   Component Value Date/Time    CHOL 165 02/28/2022 09:42 AM    CHOL 142.0 03/08/2021 12:00 AM    HDL 59 02/28/2022 09:42 AM    PSA 0.73 05/04/2015 08:41 AM    LABA1C 5.8 04/16/2019 01:57 PM          Assessment/Plan:        1. Essential hypertension, benign  Stable on zestril, inderal an norvasc and labs in 6mos   - Lipid Panel; Future  - Comprehensive Metabolic Panel; Future  - lisinopril (PRINIVIL;ZESTRIL) 30 MG tablet; Take 1 tablet by mouth once daily  Dispense: 90 tablet; Refill: 1    2. Pure hypercholesterolemia  Stale on mevacor and labs in 6mos   - Lipid Panel; Future  - Comprehensive Metabolic Panel; Future    3. Panlobular emphysema (HCC)  Stable on spiriva    4. Anxiety and depression  Stabl john zolft     5. Dysthymic disorder  Stable on zoloft  - sertraline (ZOLOFT) 50 MG tablet; Take 1 tablet by mouth daily  Dispense: 90 tablet; Refill: 1      Lazaro received counseling on the following healthy behaviors: nutrition and exercise  Reviewed prior labs and health maintenance  Continue current medications, diet and exercise. Discussed use, benefit, and side effects of prescribed medications. Barriers to medication compliance addressed. Patient given educational materials - see patient instructions  Was a self-tracking handout given in paper form or via [employer]? Yes    Requested Prescriptions      No prescriptions requested or ordered in this encounter       All patient questions answered. Patient voiced understanding. Quality Measures    Body mass index is 29.71 kg/m². Elevated. Weight control planned discussed Healthy diet and regular exercise. BP: (!) 170/80. Blood pressure is high. Treatment plan consists of Patient In-home Blood Pressure Monitoring.     Fall Risk 3/15/2022 3/12/2021 3/12/2021 6/9/2020 4/16/2019 4/9/2018 10/15/2014   2 or more falls in past year? no no no no no no no   Fall with injury in past year? no no no no no no no     The patient does not have a history of falls. I did not - not indicated , complete a risk assessment for falls. A plan of care for falls No Treatment plan indicated    Lab Results   Component Value Date    LDLCALC 76 03/08/2021    LDLCHOLESTEROL 84 02/28/2022    (goal LDL reduction with dx if diabetes is 50% LDL reduction)    PHQ Scores 9/28/2022 3/15/2022 3/12/2021 4/9/2018   PHQ2 Score 0 0 0 2   PHQ9 Score 0 0 0 2     Interpretation of Total Score Depression Severity: 1-4 = Minimal depression, 5-9 = Mild depression, 10-14 = Moderate depression, 15-19 = Moderately severe depression, 20-27 = Severe depression      Return in about 6 months (around 3/28/2023).       Electronically signed by Heidi Dumont MD on 9/28/2022 at 2:00 PM

## 2022-09-28 NOTE — PATIENT INSTRUCTIONS
Survey: You may be receiving a survey from [employer] regarding your visit today. You may get this in the mail, through your MyChart or in your email. Please complete the survey to enable us to provide the highest quality of care to you and your family. Please also, mention our names. If you cannot score us as very good (5 Stars) on any question, please feel free to call the office to discuss how we could have made your experience exceptional.      Thank You!         MD Jimbo Sanchez LPN

## 2022-09-30 DIAGNOSIS — I10 ESSENTIAL HYPERTENSION, BENIGN: ICD-10-CM

## 2022-09-30 RX ORDER — LISINOPRIL 30 MG/1
TABLET ORAL
Qty: 90 TABLET | Refills: 0 | OUTPATIENT
Start: 2022-09-30

## 2022-10-04 ENCOUNTER — OFFICE VISIT (OUTPATIENT)
Dept: FAMILY MEDICINE CLINIC | Age: 82
End: 2022-10-04
Payer: MEDICARE

## 2022-10-04 VITALS
SYSTOLIC BLOOD PRESSURE: 142 MMHG | HEART RATE: 64 BPM | OXYGEN SATURATION: 96 % | BODY MASS INDEX: 29.57 KG/M2 | WEIGHT: 212 LBS | DIASTOLIC BLOOD PRESSURE: 72 MMHG

## 2022-10-04 DIAGNOSIS — I10 ESSENTIAL HYPERTENSION, BENIGN: Primary | ICD-10-CM

## 2022-10-04 PROCEDURE — G8417 CALC BMI ABV UP PARAM F/U: HCPCS | Performed by: FAMILY MEDICINE

## 2022-10-04 PROCEDURE — G8484 FLU IMMUNIZE NO ADMIN: HCPCS | Performed by: FAMILY MEDICINE

## 2022-10-04 PROCEDURE — 99213 OFFICE O/P EST LOW 20 MIN: CPT | Performed by: FAMILY MEDICINE

## 2022-10-04 PROCEDURE — 1123F ACP DISCUSS/DSCN MKR DOCD: CPT | Performed by: FAMILY MEDICINE

## 2022-10-04 PROCEDURE — G8427 DOCREV CUR MEDS BY ELIG CLIN: HCPCS | Performed by: FAMILY MEDICINE

## 2022-10-04 PROCEDURE — 1036F TOBACCO NON-USER: CPT | Performed by: FAMILY MEDICINE

## 2022-10-04 ASSESSMENT — ENCOUNTER SYMPTOMS
COUGH: 0
EYE DISCHARGE: 0
DIARRHEA: 0
SHORTNESS OF BREATH: 0
VOMITING: 0
EYE REDNESS: 0

## 2022-10-04 NOTE — PROGRESS NOTES
HPI Notes    Name: Farhan Cleveland  : 1940        Chief Complaint:     Chief Complaint   Patient presents with    Hypertension     Pt went to [facility] ER on 22 for c/o BP running very high 240/100 at home. No new medications started, Pt instructed to continue Amlodipine 5mg BID and follow up with PCP. History of Present Illness:     Juan Miguel Silver is a 80 y.o.  male who presents with Hypertension (Pt went to [facility] ER on 22 for c/o BP running very high 240/100 at home. No new medications started, Pt instructed to continue Amlodipine 5mg BID and follow up with PCP.)      Hypertension  This is a chronic problem. The current episode started more than 1 year ago. The problem has been gradually improving (Pt states his BP was higher on  and pt felt pounding in Lt ear and had dull headache. so pt went to the ER and since then back to taking Norvasc 5mg one BID. Home -984/60-70. this AM /82 but admitted he had salty chips night before.) since onset. The problem is controlled (barry BP today 142/72). Pertinent negatives include no chest pain, malaise/fatigue, palpitations, peripheral edema or shortness of breath. There are no associated agents to hypertension. Risk factors for coronary artery disease include male gender. The current treatment provides significant improvement.      Past Medical History:     Past Medical History:   Diagnosis Date    Cancer ([address]     left lower leg    COPD (chronic obstructive pulmonary disease) (HCC)     Hyperlipidemia     Hypertension     Migraine       Reviewed all health maintenance requirements and ordered appropriate tests  Health Maintenance Due   Topic Date Due    Shingles vaccine (1 of 2) Never done       Past Surgical History:     Past Surgical History:   Procedure Laterality Date    EXCISION / BIOPSY SKIN LESION OF LEG Left 2017    LEG LESION BIOPSY EXCISION-EXCISION LEFT LEG LESION performed by Calli Hamm MD at [address] [place] [address] EXCISION / BIOPSY SKIN LESION OF LEG  11/22/2017    LEG LESION BIOPSY EXCISION performed by Tenisha Carter MD at [address] [place] [address] OFFICE/OUTPT VISIT,PROCEDURE ONLY Left 1/24/2018    LEG DEBRIDEMENT SKIN GRAFT--LEFT LEG DEBRIDEMENT WITH APLIGRAFT APPLICATION performed by Tenisha Carter MD at [address] [place] [address] Dr OFFICE/OUTPT [facility] Left 2/1/2018    LEG ULCER DEBRIDEMENT APPLICATION OF APLIGRAF performed by Tenisha Carter MD at [address] [place] [address] Dr OFFICE/OUTPT [facility] Left 2/12/2018    LEG ULCER DEBRIDEMENT APPLICATION OF APLIGRAF----Application Apligraf ,Debridement Left Lower Leg Wound performed by Tenisha Carter MD at [address] [place] [address] Dr OFFICE/OUTPT [facility] Left 2/26/2018    LEG ULCER DEBRIDEMENT APPLICATION OF APLIGRAF performed by Tenisha Carter MD at [address] [place] [address] Dr OFFICE/OUTPT [facility] Left 3/12/2018    LEG ULCER DEBRIDEMENT APPLICATION OF NUSHIELD LEFT LOWER LEG performed by Tenisha Carter MD at [address] [place] [address] Dr OFFICE/OUTPT [facility] Left 3/19/2018    LEG ULCER DEBRIDEMENT APPLICATION OF NUSHIELD performed by Tenisha Carter MD at [address] [place] [address] Dr OFFICE/OUTPT [facility] Left 3/26/2018    LEG ULCER DEBRIDEMENT APPLICATION OF NUSHIELD LEFT LOWER LEG performed by Tenisha Carter MD at [address] [place] [address] Dr OFFICE/OUTPT [facility] Left 4/2/2018    LEG ULCER DEBRIDEMENT APPLICATION OF NUSHIELD- LEFT LEG performed by Tenisha Carter MD at [address] [place] [address] Dr OFFICE/OUTPT [facility] Left 4/11/2018    LEG ULCER DEBRIDEMENT APPLICATION OF NUSHIELD-LEFT LEG performed by Tenisha Carter MD at [address] [place] [address] Dr OFFICE/OUTPT [facility] Left 4/18/2018    LEG DEBRIDEMENT SKIN GRAFT- APPLICATION OF AFFINITY performed by Tenisha Carter MD at [address] [place] [address] Left 11/22/2017    SKIN GRAFT SPLIT THICKNESS performed by Tenisha Carter MD at [address] [place] [address]          Medications:       Prior to Admission medications    Medication Sig Start Date End Date Taking? Authorizing Provider   sertraline (ZOLOFT) 50 MG tablet Take 1 tablet by mouth daily 9/28/22  Yes Benito Lennox, MD   lisinopril (PRINIVIL;ZESTRIL) 30 MG tablet Take 1 tablet by mouth once daily 9/28/22  Yes Benito Lennox, MD   amLODIPine (NORVASC) 5 MG tablet Take 1 tablet by mouth twice daily  Patient taking differently: daily Take 1 tablet by mouth twice daily 7/11/22  Yes Benito Lennox, MD   lovastatin (MEVACOR) 40 MG tablet Take 1 tablet by mouth nightly 7/11/22  Yes Benito Lennox, MD   propranolol (INDERAL LA) 80 MG extended release capsule Take 1 capsule by mouth once daily 5/17/22  Yes Benito Lennox, MD   aspirin 81 MG EC tablet 1 tablet   Yes Historical Provider, MD   Multiple Vitamin (MULTI VITAMIN DAILY PO) Take by mouth daily   Yes Historical Provider, MD   Docusate Calcium (STOOL SOFTENER PO) Take  by mouth daily. Yes Historical Provider, MD   tiotropium (SPIRIVA HANDIHALER) 18 MCG inhalation capsule Inhale 1 capsule into the lungs daily  Patient not taking: No sig reported 3/12/21   Benito Lennox, MD   albuterol (PROVENTIL) (2.5 MG/3ML) 0.083% nebulizer solution Take 3 mLs by nebulization every 6 hours as needed for Wheezing  Patient not taking: No sig reported 5/29/19   Marion Buerger A Jump, DO        Allergies:       Patient has no known allergies. Social History:     Tobacco:    reports that he quit smoking about 19 years ago. His smoking use included cigarettes. He started smoking about 63 years ago. He has a 216.00 pack-year smoking history. He has never used smokeless tobacco.  Alcohol:      reports current alcohol use of about 1.0 standard drink per week. Drug Use:  reports no history of drug use. Family History:     History reviewed. No pertinent family history. Review of Systems:       Review of Systems   Constitutional:  Negative for chills, fever and malaise/fatigue.    Eyes:  Negative for discharge and redness. Respiratory:  Negative for cough and shortness of breath. Cardiovascular:  Negative for chest pain, palpitations and leg swelling. Gastrointestinal:  Negative for diarrhea and vomiting. Skin:  Negative for rash. Neurological:  Negative for dizziness and facial asymmetry. Physical Exam:     Physical Exam  Vitals reviewed. Constitutional:       General: He is not in acute distress. Appearance: Normal appearance. He is well-developed. He is not ill-appearing. HENT:      Head: Normocephalic and atraumatic. Eyes:      Conjunctiva/sclera: Conjunctivae normal.   Neck:      Thyroid: No thyromegaly. Cardiovascular:      Rate and Rhythm: Normal rate and regular rhythm. Heart sounds: Normal heart sounds. No murmur heard. Pulmonary:      Effort: Pulmonary effort is normal.      Breath sounds: Normal breath sounds. Musculoskeletal:      Cervical back: Neck supple. Right lower leg: No edema. Left lower leg: No edema. Skin:     Findings: No rash. Neurological:      Mental Status: He is alert.        Vitals:  BP (!) 142/72 (Site: Left Upper Arm, Cuff Size: Medium Adult)   Pulse 64   Wt 212 lb (96.2 kg)   SpO2 96%   BMI 29.57 kg/m²       Data:     Lab Results   Component Value Date/Time     02/28/2022 09:41 AM    K 5.3 02/28/2022 09:41 AM     02/28/2022 09:41 AM    CO2 30 02/28/2022 09:41 AM    BUN 17 02/28/2022 09:41 AM    CREATININE 0.96 02/28/2022 09:41 AM    GLUCOSE 131 02/28/2022 09:41 AM    PROT 7.2 02/28/2022 09:41 AM    LABALBU 4.6 02/28/2022 09:41 AM    BILITOT 0.46 02/28/2022 09:41 AM    ALKPHOS 83 02/28/2022 09:41 AM    AST 17 02/28/2022 09:41 AM    ALT 18 02/28/2022 09:41 AM     Lab Results   Component Value Date/Time    WBC 8.7 02/28/2022 09:41 AM    RBC 5.11 02/28/2022 09:41 AM    HGB 16.1 02/28/2022 09:41 AM    HCT 49.6 02/28/2022 09:41 AM    MCV 97.1 02/28/2022 09:41 AM    MCH 31.5 02/28/2022 09:41 AM    MCHC 32.5 02/28/2022 09:41 AM    RDW 11.9 02/28/2022 09:41 AM     02/28/2022 09:41 AM    MPV 10.9 02/28/2022 09:41 AM     Lab Results   Component Value Date/Time    TSH 1.20 05/09/2018 09:07 AM     Lab Results   Component Value Date/Time    CHOL 165 02/28/2022 09:42 AM    CHOL 142.0 03/08/2021 12:00 AM    HDL 59 02/28/2022 09:42 AM    PSA 0.73 05/04/2015 08:41 AM    LABA1C 5.8 04/16/2019 01:57 PM          Assessment/Plan:        1. Essential hypertension, benign  Doing better by taking there Norvasc 5mg one BID and LOW salt and more water in diet. Try walking more      Kaci Lincoln received counseling on the following healthy behaviors: nutrition and exercise  Reviewed prior labs and health maintenance  Continue current medications, diet and exercise. Discussed use, benefit, and side effects of prescribed medications. Barriers to medication compliance addressed. Patient given educational materials - see patient instructions  Was a self-tracking handout given in paper form or via [employer]? Yes    Requested Prescriptions      No prescriptions requested or ordered in this encounter       All patient questions answered. Patient voiced understanding. Quality Measures    Body mass index is 29.57 kg/m². Elevated. Weight control planned discussed Healthy diet and regular exercise. BP: (!) 142/72. Blood pressure is Normal. Treatment plan consists of No treatment change needed. Fall Risk 3/15/2022 3/12/2021 3/12/2021 6/9/2020 4/16/2019 4/9/2018 10/15/2014   2 or more falls in past year? no no no no no no no   Fall with injury in past year? no no no no no no no     The patient does not have a history of falls. I did not - not indicated , complete a risk assessment for falls.  A plan of care for falls No Treatment plan indicated    Lab Results   Component Value Date    LDLCALC 76 03/08/2021    LDLCHOLESTEROL 84 02/28/2022    (goal LDL reduction with dx if diabetes is 50% LDL reduction)    PHQ Scores 9/28/2022 3/15/2022 3/12/2021 4/9/2018   PHQ2 Score 0 0 0 2   PHQ9 Score 0 0 0 2     Interpretation of Total Score Depression Severity: 1-4 = Minimal depression, 5-9 = Mild depression, 10-14 = Moderate depression, 15-19 = Moderately severe depression, 20-27 = Severe depression      Return if symptoms worsen or fail to improve.       Electronically signed by Shelly Stauffer MD on 10/4/2022 at 11:13 AM

## 2022-11-11 DIAGNOSIS — I10 ESSENTIAL HYPERTENSION, BENIGN: ICD-10-CM

## 2022-11-14 RX ORDER — PROPRANOLOL HYDROCHLORIDE 80 MG/1
CAPSULE, EXTENDED RELEASE ORAL
Qty: 90 CAPSULE | Refills: 1 | Status: SHIPPED | OUTPATIENT
Start: 2022-11-14

## 2023-01-31 DIAGNOSIS — I10 ESSENTIAL HYPERTENSION, BENIGN: ICD-10-CM

## 2023-01-31 RX ORDER — AMLODIPINE BESYLATE 5 MG/1
TABLET ORAL
Qty: 180 TABLET | Refills: 1 | Status: SHIPPED | OUTPATIENT
Start: 2023-01-31

## 2023-01-31 NOTE — TELEPHONE ENCOUNTER
Last OV: 10/4/2022  chronic ER F/U   Last RX:    Next scheduled apt: 3/29/2023  AWV             Surescript requesting a refill

## 2023-02-27 ENCOUNTER — HOSPITAL ENCOUNTER (OUTPATIENT)
Dept: CT IMAGING | Age: 83
Discharge: HOME | End: 2023-03-01
Payer: MEDICARE

## 2023-02-27 ENCOUNTER — HOSPITAL ENCOUNTER (OUTPATIENT)
Age: 83
Discharge: HOME | End: 2023-02-27
Payer: MEDICARE

## 2023-02-27 DIAGNOSIS — I10 ESSENTIAL HYPERTENSION, BENIGN: ICD-10-CM

## 2023-02-27 DIAGNOSIS — C49.22 LEIOMYOSARCOMA OF LEG, LEFT (HCC): ICD-10-CM

## 2023-02-27 DIAGNOSIS — R91.8 PULMONARY NODULES: ICD-10-CM

## 2023-02-27 DIAGNOSIS — E78.00 PURE HYPERCHOLESTEROLEMIA: ICD-10-CM

## 2023-02-27 LAB
ABSOLUTE EOS #: 0.27 K/UL (ref 0–0.44)
ABSOLUTE IMMATURE GRANULOCYTE: <0.03 K/UL (ref 0–0.3)
ABSOLUTE LYMPH #: 2.79 K/UL (ref 1.1–3.7)
ABSOLUTE MONO #: 0.9 K/UL (ref 0.1–1.2)
ALBUMIN SERPL-MCNC: 4.6 G/DL (ref 3.5–5.2)
ALBUMIN/GLOBULIN RATIO: 1.3 (ref 1–2.5)
ALP SERPL-CCNC: 90 U/L (ref 40–129)
ALT SERPL-CCNC: 18 U/L (ref 5–41)
ANION GAP SERPL CALCULATED.3IONS-SCNC: 7 MMOL/L (ref 9–17)
AST SERPL-CCNC: 17 U/L
BASOPHILS # BLD: 1 % (ref 0–2)
BASOPHILS ABSOLUTE: 0.08 K/UL (ref 0–0.2)
BILIRUB SERPL-MCNC: 0.5 MG/DL (ref 0.3–1.2)
BUN SERPL-MCNC: 18 MG/DL (ref 8–23)
BUN/CREAT BLD: 18 (ref 9–20)
CALCIUM SERPL-MCNC: 9.8 MG/DL (ref 8.6–10.4)
CHLORIDE SERPL-SCNC: 101 MMOL/L (ref 98–107)
CO2 SERPL-SCNC: 31 MMOL/L (ref 20–31)
CREAT SERPL-MCNC: 0.98 MG/DL (ref 0.7–1.2)
EOSINOPHILS RELATIVE PERCENT: 3 % (ref 1–4)
GFR SERPL CREATININE-BSD FRML MDRD: >60 ML/MIN/1.73M2
GLUCOSE SERPL-MCNC: 101 MG/DL (ref 70–99)
HCT VFR BLD AUTO: 49.9 % (ref 40.7–50.3)
HGB BLD-MCNC: 17 G/DL (ref 13–17)
IMMATURE GRANULOCYTES: 0 %
LYMPHOCYTES # BLD: 28 % (ref 24–43)
MCH RBC QN AUTO: 32.8 PG (ref 25.2–33.5)
MCHC RBC AUTO-ENTMCNC: 34.1 G/DL (ref 28.4–34.8)
MCV RBC AUTO: 96.3 FL (ref 82.6–102.9)
MONOCYTES # BLD: 9 % (ref 3–12)
NRBC AUTOMATED: 0 PER 100 WBC
PDW BLD-RTO: 11.9 % (ref 11.8–14.4)
PLATELET # BLD AUTO: 253 K/UL (ref 138–453)
PMV BLD AUTO: 10.4 FL (ref 8.1–13.5)
POTASSIUM SERPL-SCNC: 4.8 MMOL/L (ref 3.7–5.3)
PROT SERPL-MCNC: 8.2 G/DL (ref 6.4–8.3)
RBC # BLD: 5.18 M/UL (ref 4.21–5.77)
SEG NEUTROPHILS: 59 % (ref 36–65)
SEGMENTED NEUTROPHILS ABSOLUTE COUNT: 5.98 K/UL (ref 1.5–8.1)
SODIUM SERPL-SCNC: 139 MMOL/L (ref 135–144)
WBC # BLD AUTO: 10 K/UL (ref 3.5–11.3)

## 2023-02-27 PROCEDURE — 80061 LIPID PANEL: CPT

## 2023-02-27 PROCEDURE — 80053 COMPREHEN METABOLIC PANEL: CPT

## 2023-02-27 PROCEDURE — 85025 COMPLETE CBC W/AUTO DIFF WBC: CPT

## 2023-02-27 PROCEDURE — 71260 CT THORAX DX C+: CPT

## 2023-02-27 PROCEDURE — 6360000004 HC RX CONTRAST MEDICATION: Performed by: INTERNAL MEDICINE

## 2023-02-27 PROCEDURE — 36415 COLL VENOUS BLD VENIPUNCTURE: CPT

## 2023-02-27 RX ADMIN — IOPAMIDOL 75 ML: 755 INJECTION, SOLUTION INTRAVENOUS at 13:27

## 2023-02-28 LAB
CHOLEST SERPL-MCNC: 164 MG/DL
CHOLESTEROL/HDL RATIO: 3
HDLC SERPL-MCNC: 54 MG/DL
LDLC SERPL CALC-MCNC: 83 MG/DL (ref 0–130)
TRIGL SERPL-MCNC: 136 MG/DL

## 2023-02-28 RX ORDER — LOVASTATIN 40 MG/1
40 TABLET ORAL NIGHTLY
Qty: 90 TABLET | Refills: 1 | Status: SHIPPED | OUTPATIENT
Start: 2023-02-28

## 2023-03-01 ENCOUNTER — OFFICE VISIT (OUTPATIENT)
Dept: ONCOLOGY | Age: 83
End: 2023-03-01
Payer: MEDICARE

## 2023-03-01 VITALS
DIASTOLIC BLOOD PRESSURE: 75 MMHG | BODY MASS INDEX: 29.99 KG/M2 | SYSTOLIC BLOOD PRESSURE: 167 MMHG | HEART RATE: 65 BPM | RESPIRATION RATE: 18 BRPM | WEIGHT: 215 LBS | TEMPERATURE: 97.8 F

## 2023-03-01 DIAGNOSIS — C49.22 LEIOMYOSARCOMA OF LEG, LEFT (HCC): Primary | ICD-10-CM

## 2023-03-01 DIAGNOSIS — R91.8 PULMONARY NODULES: ICD-10-CM

## 2023-03-01 PROCEDURE — G8417 CALC BMI ABV UP PARAM F/U: HCPCS | Performed by: INTERNAL MEDICINE

## 2023-03-01 PROCEDURE — 3078F DIAST BP <80 MM HG: CPT | Performed by: INTERNAL MEDICINE

## 2023-03-01 PROCEDURE — 3077F SYST BP >= 140 MM HG: CPT | Performed by: INTERNAL MEDICINE

## 2023-03-01 PROCEDURE — G8427 DOCREV CUR MEDS BY ELIG CLIN: HCPCS | Performed by: INTERNAL MEDICINE

## 2023-03-01 PROCEDURE — 1123F ACP DISCUSS/DSCN MKR DOCD: CPT | Performed by: INTERNAL MEDICINE

## 2023-03-01 PROCEDURE — 99214 OFFICE O/P EST MOD 30 MIN: CPT | Performed by: INTERNAL MEDICINE

## 2023-03-01 PROCEDURE — G8484 FLU IMMUNIZE NO ADMIN: HCPCS | Performed by: INTERNAL MEDICINE

## 2023-03-01 PROCEDURE — 1036F TOBACCO NON-USER: CPT | Performed by: INTERNAL MEDICINE

## 2023-03-01 NOTE — PROGRESS NOTES
Chief Complaint   Patient presents with    Follow-up     Leiomyosarcoma of leg left       DIAGNOSIS:   Leiomyosarcoma of the left leg  CURRENT THERAPY:  Status post wide excision in 2018  BRIEF CASE HISTORY:   Jeovany Nava is a very pleasant 80 y.o. male who was followed by Dr. Olivia Sousa because of leiomyosarcoma of the left leg. He had a nodule  on the left shin since childhood but in 2018 started getting bigger. He had that excised and showed leiomyosarcoma low-grade. He had a complete resection and required multiple grafting to make sure it is completely closed. He was referred to oncology and was followed conservatively. INTERIM HISTORY:  The patient comes in today for a follow up, he is doing well without any symptoms. Specifically he denies any bleeding or bruising. The area is completely covered by skin and no ulcers. He has chronic fatigue and some shortness of breath but likely related to COPD. Symptoms are much better recently. Very minimal shortness of breath. No hemoptysis. No chest pain. PAST MEDICAL HISTORY: has a past medical history of Cancer ([facility] COPD (chronic obstructive pulmonary disease) ([facility] Hyperlipidemia, Hypertension, and Migraine. PAST SURGICAL HISTORY: has a past surgical history that includes Tonsillectomy; hernia repair; Hemorrhoid surgery; EXCISION / BIOPSY SKIN LESION OF LEG (Left, 11/13/2017);  Skin graft (Left, 11/22/2017); EXCISION / BIOPSY SKIN LESION OF LEG (11/22/2017); pr office/outpt visit,procedure only (Left, 1/24/2018); pr office/outpt visit,procedure only (Left, 2/1/2018); pr office/outpt visit,procedure only (Left, 2/12/2018); pr office/outpt visit,procedure only (Left, 2/26/2018); pr office/outpt visit,procedure only (Left, 3/12/2018); pr office/outpt visit,procedure only (Left, 3/19/2018); pr office/outpt visit,procedure only (Left, 3/26/2018); pr office/outpt visit,procedure only (Left, 4/2/2018); pr office/outpt visit,procedure only (Left, 4/11/2018); and pr office/outpt visit,procedure only (Left, 4/18/2018). CURRENT MEDICATIONS:  has a current medication list which includes the following prescription(s): lovastatin, amlodipine, propranolol, sertraline, lisinopril, aspirin, multiple vitamin, docusate calcium, spiriva handihaler, and albuterol, and the following Facility-Administered Medications: lidocaine-epinephrine. ALLERGIES:  has No Known Allergies. FAMILY HISTORY: Negative for any hematological or oncological conditions. SOCIAL HISTORY:  reports that he quit smoking about 19 years ago. His smoking use included cigarettes. He started smoking about 64 years ago. He has a 216.00 pack-year smoking history. He has never used smokeless tobacco. He reports current alcohol use of about 1.0 standard drink per week. He reports that he does not use drugs. REVIEW OF SYSTEMS:  General: no fever or night sweats, Weight is stable. ENT: No double or blurred vision, no tinnitus or hearing problem, no dysphagia or sore throat   Respiratory: No chest pain,+ shortness of breath, no cough or hemoptysis. Cardiovascular: Denies chest pain, PND or orthopnea. No L E swelling or palpitations. Gastrointestinal:    No nausea or vomiting, abdominal pain, diarrhea or constipation. Genitourinary: Denies dysuria, hematuria, frequency, urgency or incontinence. Neurological: Denies headaches, decreased LOC, no sensory or motor focal deficits. Musculoskeletal:  No arthralgia no back pain or joint swelling. Skin: There are no rashes or bleeding. Psychiatric:  No anxiety, no depression. Endocrine: no diabetes or thyroid disease. Hematologic: no bleeding , no adenopathy. PHYSICAL EXAM: Shows a well appearing 80y.o.-year-old male who is not in pain or distress. Vital Signs: Blood pressure (!) 167/75, pulse 65, temperature 97.8 °F (36.6 °C), temperature source Temporal, resp. rate 18, weight 215 lb (97.5 kg). HEENT: Normocephalic and atraumatic.  Pupils are equal, round, reactive to light and accommodation. Extraocular muscles are intact. Neck: Showed no JVD, no carotid bruit . Lungs: Clear to auscultation bilaterally. Heart: Regular without any murmur. Abdomen: Soft, nontender. No hepatosplenomegaly. Extremities: Lower extremities show no edema, clubbing, or cyanosis. He has a well-healed skin graft in the left shin. No evidence of recurrence breasts: Examination not done today. Neuro exam: intact cranial nerves bilaterally no motor or sensory deficit, gait is normal. Lymphatic: no adenopathy appreciated in the supraclavicular, axillary, cervical or inguinal area    Review of radiological studies:     Review of laboratory data:     IMPRESSION:   History of leiomyosarcoma of the left lower extremity, completely removed  COPD  In remission, surveillance  Plan:   Patient continues to do well with no evidence of recurrence. I reviewed the labs and CT scan with the patient and his wife. There is no evidence of relapse or any abnormalities. We again discussed the nature of this cancer and management plans. Continue surveillance. We will see him in 1 year with repeated chest CT scan and labs. Sooner for any problems. Patient's questions were answered to the best of his satisfaction and he verbalized full understanding and agreement. [facility]                            This note is created with the assistance of a speech recognition program.  While intending to generate a document that actually reflects the content of the visit, the document can still have some errors including those of syntax and sound a like substitutions which may escape proof reading. It such instances, actual meaning can be extrapolated by contextual diversion.

## 2023-03-02 ENCOUNTER — TELEPHONE (OUTPATIENT)
Dept: FAMILY MEDICINE CLINIC | Age: 83
End: 2023-03-02

## 2023-03-02 NOTE — TELEPHONE ENCOUNTER
----- Message from Martha Evangelista sent at 3/2/2023 11:35 AM EST -----  Subject: Message to Provider    QUESTIONS  Information for Provider? Patient's wife is calling to verify if patient   needs additional blood work orders from PCP other than what has been   ordered by his specialist.   ---------------------------------------------------------------------------  --------------  [facility]  [phone]; OK to leave message on voicemail  ---------------------------------------------------------------------------  --------------  SCRIPT ANSWERS  Relationship to Patient? Spouse/Partner  Representative Name? Mauri Méndez  Is the [place] [employer] on the appropriate HIPAA document in Epic?  Yes

## 2023-03-03 NOTE — TELEPHONE ENCOUNTER
My nurse called pt and told him that he just had lab orders drawn for his specialist and me in Feb and so all good

## 2023-03-29 ENCOUNTER — OFFICE VISIT (OUTPATIENT)
Dept: FAMILY MEDICINE CLINIC | Age: 83
End: 2023-03-29

## 2023-03-29 VITALS
SYSTOLIC BLOOD PRESSURE: 138 MMHG | DIASTOLIC BLOOD PRESSURE: 70 MMHG | HEART RATE: 74 BPM | HEIGHT: 71 IN | BODY MASS INDEX: 29.96 KG/M2 | OXYGEN SATURATION: 94 % | WEIGHT: 214 LBS

## 2023-03-29 DIAGNOSIS — I10 ESSENTIAL HYPERTENSION, BENIGN: ICD-10-CM

## 2023-03-29 DIAGNOSIS — E78.00 PURE HYPERCHOLESTEROLEMIA: ICD-10-CM

## 2023-03-29 DIAGNOSIS — Z00.00 MEDICARE ANNUAL WELLNESS VISIT, SUBSEQUENT: Primary | ICD-10-CM

## 2023-03-29 DIAGNOSIS — I87.8 VENOUS STASIS: ICD-10-CM

## 2023-03-29 DIAGNOSIS — J43.1 PANLOBULAR EMPHYSEMA (HCC): ICD-10-CM

## 2023-03-29 DIAGNOSIS — F34.1 DYSTHYMIC DISORDER: ICD-10-CM

## 2023-03-29 DIAGNOSIS — R73.9 HYPERGLYCEMIA: ICD-10-CM

## 2023-03-29 RX ORDER — ALBUTEROL SULFATE 2.5 MG/3ML
2.5 SOLUTION RESPIRATORY (INHALATION) EVERY 6 HOURS PRN
Qty: 60 EACH | Refills: 2 | Status: SHIPPED | OUTPATIENT
Start: 2023-03-29

## 2023-03-29 SDOH — ECONOMIC STABILITY: INCOME INSECURITY: HOW HARD IS IT FOR YOU TO PAY FOR THE VERY BASICS LIKE FOOD, HOUSING, MEDICAL CARE, AND HEATING?: NOT VERY HARD

## 2023-03-29 SDOH — ECONOMIC STABILITY: FOOD INSECURITY: WITHIN THE PAST 12 MONTHS, YOU WORRIED THAT YOUR FOOD WOULD RUN OUT BEFORE YOU GOT MONEY TO BUY MORE.: NEVER TRUE

## 2023-03-29 SDOH — ECONOMIC STABILITY: HOUSING INSECURITY
IN THE LAST 12 MONTHS, WAS THERE A TIME WHEN YOU DID NOT HAVE A STEADY PLACE TO SLEEP OR SLEPT IN A SHELTER (INCLUDING NOW)?: NO

## 2023-03-29 SDOH — ECONOMIC STABILITY: FOOD INSECURITY: WITHIN THE PAST 12 MONTHS, THE FOOD YOU BOUGHT JUST DIDN'T LAST AND YOU DIDN'T HAVE MONEY TO GET MORE.: NEVER TRUE

## 2023-03-29 ASSESSMENT — LIFESTYLE VARIABLES
HOW MANY STANDARD DRINKS CONTAINING ALCOHOL DO YOU HAVE ON A TYPICAL DAY: PATIENT DOES NOT DRINK
HOW OFTEN DO YOU HAVE A DRINK CONTAINING ALCOHOL: NEVER

## 2023-03-29 ASSESSMENT — ENCOUNTER SYMPTOMS
NAUSEA: 0
EYE DISCHARGE: 0
COUGH: 0
SHORTNESS OF BREATH: 0
WHEEZING: 0
CONSTIPATION: 0
DIFFICULTY BREATHING: 0
CHEST TIGHTNESS: 0
BLOOD IN STOOL: 0
VOMITING: 0
DIARRHEA: 0
ABDOMINAL PAIN: 0
EYE REDNESS: 0
TROUBLE SWALLOWING: 0

## 2023-03-29 ASSESSMENT — PATIENT HEALTH QUESTIONNAIRE - PHQ9
SUM OF ALL RESPONSES TO PHQ9 QUESTIONS 1 & 2: 0
6. FEELING BAD ABOUT YOURSELF - OR THAT YOU ARE A FAILURE OR HAVE LET YOURSELF OR YOUR FAMILY DOWN: 0
7. TROUBLE CONCENTRATING ON THINGS, SUCH AS READING THE NEWSPAPER OR WATCHING TELEVISION: 0
9. THOUGHTS THAT YOU WOULD BE BETTER OFF DEAD, OR OF HURTING YOURSELF: 0
SUM OF ALL RESPONSES TO PHQ QUESTIONS 1-9: 0
1. LITTLE INTEREST OR PLEASURE IN DOING THINGS: 0
SUM OF ALL RESPONSES TO PHQ QUESTIONS 1-9: 0
8. MOVING OR SPEAKING SO SLOWLY THAT OTHER PEOPLE COULD HAVE NOTICED. OR THE OPPOSITE, BEING SO FIGETY OR RESTLESS THAT YOU HAVE BEEN MOVING AROUND A LOT MORE THAN USUAL: 0
SUM OF ALL RESPONSES TO PHQ QUESTIONS 1-9: 0
5. POOR APPETITE OR OVEREATING: 0
SUM OF ALL RESPONSES TO PHQ QUESTIONS 1-9: 0
4. FEELING TIRED OR HAVING LITTLE ENERGY: 0
3. TROUBLE FALLING OR STAYING ASLEEP: 0
2. FEELING DOWN, DEPRESSED OR HOPELESS: 0

## 2023-03-29 ASSESSMENT — COPD QUESTIONNAIRES: COPD: 1

## 2023-03-29 NOTE — PROGRESS NOTES
nebulization every 6 hours as needed for Wheezing Yes Fahad Yoo MD   lovastatin (MEVACOR) 40 MG tablet Take 1 tablet by mouth nightly Yes Fahad Yoo MD   amLODIPine (NORVASC) 5 MG tablet Take 1 tablet by mouth twice daily Yes Fahad Yoo MD   propranolol (INDERAL LA) 80 MG extended release capsule Take 1 capsule by mouth once daily Yes Fahad Yoo MD   sertraline (ZOLOFT) 50 MG tablet Take 1 tablet by mouth daily Yes Fahad Yoo MD   lisinopril (PRINIVIL;ZESTRIL) 30 MG tablet Take 1 tablet by mouth once daily Yes Fahad Yoo MD   aspirin 81 MG EC tablet 1 tablet Yes Historical Provider, MD   Multiple Vitamin (MULTI VITAMIN DAILY PO) Take by mouth daily Yes Historical Provider, MD   Docusate Calcium (STOOL SOFTENER PO) Take  by mouth daily.  Yes Historical Provider, MD   tiotropium (SPIRIVA HANDIHALER) 18 MCG inhalation capsule Inhale 1 capsule into the lungs daily  Patient not taking: No sig reported  Fahad Yoo MD       [facility] (Including outside providers/suppliers regularly involved in providing care):   Patient Care Team:  Fahad Yoo MD as PCP - General (Family Medicine)  Fahad Yoo MD as PCP - Empaneled Provider  Fara Cleary MD as Consulting Physician (General Surgery)     Reviewed and updated this visit:  Tobacco  Allergies  Meds  Problems  Med Hx  Surg Hx  Soc Hx  Fam Hx               Fahad Yoo MD
164 02/27/2023 12:45 PM    CHOL 142.0 03/08/2021 12:00 AM    HDL 54 02/27/2023 12:45 PM    PSA 0.73 05/04/2015 08:41 AM    LABA1C 5.8 04/16/2019 01:57 PM          Assessment/Plan:        1. Essential hypertension, benign  Stable on Norvasc and zestril     2. Pure hypercholesterolemia  Stable on mevacor and reviewed recent albs     3. Dysthymic disorder  Stable on zoloft     4. Panlobular emphysema (HCC)  Stable on PRN albuterol and not taking spiriva  - albuterol (PROVENTIL) (2.5 MG/3ML) 0.083% nebulizer solution; Take 3 mLs by nebulization every 6 hours as needed for Wheezing  Dispense: 60 each; Refill: 2    5. Hyperglycemia  Pt to monitor good low carb and sweet diet     6. Venous stasis  Pt has some swelling so will start wearing support hose daily on Lt lower leg.   - Compression Stocking    7. Medicare annual wellness visit, subsequent  Completed         Justina Jackson received counseling on the following healthy behaviors: nutrition and exercise  Reviewed prior labs and health maintenance  Continue current medications, diet and exercise. Discussed use, benefit, and side effects of prescribed medications. Barriers to medication compliance addressed. Patient given educational materials - see patient instructions  Was a self-tracking handout given in paper form or via [employer]? Yes    Requested Prescriptions     Signed Prescriptions Disp Refills    albuterol (PROVENTIL) (2.5 MG/3ML) 0.083% nebulizer solution 60 each 2     Sig: Take 3 mLs by nebulization every 6 hours as needed for Wheezing       All patient questions answered. Patient voiced understanding. Quality Measures    Body mass index is 29.85 kg/m². Elevated. Weight control planned discussed Healthy diet and regular exercise. BP: 138/70. Blood pressure is normal. Treatment plan consists of No treatment change needed.     Fall Risk 3/29/2023 3/15/2022 3/12/2021 3/12/2021 6/9/2020 4/16/2019 4/9/2018   2 or more falls in past year? no no no no no no no

## 2023-03-29 NOTE — PATIENT INSTRUCTIONS
or after sweating, drying off with a towel, or swimming. Always wear a seat belt when traveling in a car. Always wear a helmet when riding a bicycle or motorcycle.

## 2023-03-30 DIAGNOSIS — I10 ESSENTIAL HYPERTENSION, BENIGN: ICD-10-CM

## 2023-03-30 RX ORDER — LISINOPRIL 30 MG/1
TABLET ORAL
Qty: 90 TABLET | Refills: 1 | Status: SHIPPED | OUTPATIENT
Start: 2023-03-30

## 2023-05-08 DIAGNOSIS — I10 ESSENTIAL HYPERTENSION, BENIGN: ICD-10-CM

## 2023-05-08 RX ORDER — PROPRANOLOL HYDROCHLORIDE 80 MG/1
CAPSULE, EXTENDED RELEASE ORAL
Qty: 90 CAPSULE | Refills: 0 | Status: SHIPPED | OUTPATIENT
Start: 2023-05-08

## 2023-05-08 NOTE — TELEPHONE ENCOUNTER
Last OV: 3/29/2023  Last RX: 11/14/2022   Next scheduled apt: 9/28/2023        Surescripts requesting refill

## 2023-05-10 ENCOUNTER — OFFICE VISIT (OUTPATIENT)
Dept: FAMILY MEDICINE CLINIC | Age: 83
End: 2023-05-10
Payer: MEDICARE

## 2023-05-10 VITALS
OXYGEN SATURATION: 96 % | BODY MASS INDEX: 29.85 KG/M2 | HEART RATE: 60 BPM | WEIGHT: 214 LBS | DIASTOLIC BLOOD PRESSURE: 64 MMHG | SYSTOLIC BLOOD PRESSURE: 138 MMHG

## 2023-05-10 DIAGNOSIS — J34.89 SINUS PRESSURE: Primary | ICD-10-CM

## 2023-05-10 DIAGNOSIS — J02.9 SORE THROAT: ICD-10-CM

## 2023-05-10 DIAGNOSIS — J06.9 VIRAL URI: ICD-10-CM

## 2023-05-10 DIAGNOSIS — R05.9 COUGH IN ADULT: ICD-10-CM

## 2023-05-10 PROCEDURE — G8417 CALC BMI ABV UP PARAM F/U: HCPCS | Performed by: STUDENT IN AN ORGANIZED HEALTH CARE EDUCATION/TRAINING PROGRAM

## 2023-05-10 PROCEDURE — 99213 OFFICE O/P EST LOW 20 MIN: CPT | Performed by: STUDENT IN AN ORGANIZED HEALTH CARE EDUCATION/TRAINING PROGRAM

## 2023-05-10 PROCEDURE — 1123F ACP DISCUSS/DSCN MKR DOCD: CPT | Performed by: STUDENT IN AN ORGANIZED HEALTH CARE EDUCATION/TRAINING PROGRAM

## 2023-05-10 PROCEDURE — G8427 DOCREV CUR MEDS BY ELIG CLIN: HCPCS | Performed by: STUDENT IN AN ORGANIZED HEALTH CARE EDUCATION/TRAINING PROGRAM

## 2023-05-10 PROCEDURE — 1036F TOBACCO NON-USER: CPT | Performed by: STUDENT IN AN ORGANIZED HEALTH CARE EDUCATION/TRAINING PROGRAM

## 2023-05-10 PROCEDURE — 3078F DIAST BP <80 MM HG: CPT | Performed by: STUDENT IN AN ORGANIZED HEALTH CARE EDUCATION/TRAINING PROGRAM

## 2023-05-10 PROCEDURE — 3075F SYST BP GE 130 - 139MM HG: CPT | Performed by: STUDENT IN AN ORGANIZED HEALTH CARE EDUCATION/TRAINING PROGRAM

## 2023-05-10 ASSESSMENT — ENCOUNTER SYMPTOMS
ABDOMINAL PAIN: 0
EYE ITCHING: 1
DIARRHEA: 0
SINUS PRESSURE: 1
NAUSEA: 0
EYE REDNESS: 1
RHINORRHEA: 1
EYE DISCHARGE: 1
SORE THROAT: 1
COUGH: 1
VOMITING: 0
WHEEZING: 0
SHORTNESS OF BREATH: 0
BACK PAIN: 0
SINUS PAIN: 0

## 2023-05-10 NOTE — PATIENT INSTRUCTIONS
SURVEY:    You may be receiving a survey from [employer] regarding your visit today. You may get this in the mail, through your MyChart or in your email. Please complete the survey to enable us to provide the highest quality of care to you and your family. If you cannot score us as very good ( 5 Stars) on any question, please feel free to call the office to discuss how we could have made your experience exceptional.     Thank you.     Clinical Care Team:  Dr. Slade De La Cruz, DO Sharon Chris, EMMIE    Triage:  Chantel Almanzar, [facility] Team:  Imelda Cheung

## 2023-05-10 NOTE — PROGRESS NOTES
DEBRIDEMENT APPLICATION OF APLIGRAF performed by Honey Stubbs MD at [address] [place] [address] Dr OFFICE/OUTPT [address] [facility] [address] Left 3/12/2018    LEG ULCER DEBRIDEMENT APPLICATION OF NUSHIELD LEFT LOWER LEG performed by Honey Stubbs MD at [address] [place] [address] Dr OFFICE/OUTPT [address] [facility] [address] Left 3/19/2018    LEG ULCER DEBRIDEMENT APPLICATION OF Onita Alejandra performed by Honey Stubbs MD at [address] [place] [address] Dr OFFICE/OUTPT [facility] Left 3/26/2018    LEG ULCER DEBRIDEMENT APPLICATION OF NUSHIELD LEFT LOWER LEG performed by Honey Stubbs MD at [address] [place] [address] Dr OFFICE/OUTPT [address] [facility] Left 4/2/2018    LEG ULCER DEBRIDEMENT APPLICATION OF [place]- LEFT LEG performed by Honey Stubbs MD at [address] [place] [address] Dr OFFICE/OUTPT [facility] Left 4/11/2018    LEG ULCER DEBRIDEMENT APPLICATION OF NUSHIELD-LEFT LEG performed by Honey Stubbs MD at [address] [place] [address] Dr OFFICE/OUTPT [facility] Left 4/18/2018    LEG DEBRIDEMENT SKIN GRAFT- APPLICATION OF AFFINITY performed by Honey Stubbs MD at [address] [place] [address] Left 11/22/2017    SKIN GRAFT SPLIT THICKNESS performed by Honey Stubbs MD at [facility]          Medications:       Prior to Admission medications    Medication Sig Start Date End Date Taking?  Authorizing Provider   propranolol (INDERAL LA) 80 MG extended release capsule Take 1 capsule by mouth once daily 5/8/23  Yes Lamin Lopez MD   lisinopril (PRINIVIL;ZESTRIL) 30 MG tablet Take 1 tablet by mouth once daily 3/30/23  Yes Lamin Lopez MD   albuterol (PROVENTIL) (2.5 MG/3ML) 0.083% nebulizer solution Take 3 mLs by nebulization every 6 hours as needed for Wheezing 3/29/23  Yes Lamin Lopez MD   lovastatin (MEVACOR) 40 MG tablet Take 1 tablet by mouth nightly 2/28/23  Yes Lamin Lopez MD   amLODIPine (NORVASC) 5 MG tablet Take 1 tablet by mouth twice daily 1/31/23  Yes Lamin Lopez MD   sertraline (ZOLOFT) 50 MG tablet Take 1 tablet by mouth daily 9/28/22  Yes

## 2023-05-24 ENCOUNTER — OFFICE VISIT (OUTPATIENT)
Dept: FAMILY MEDICINE CLINIC | Age: 83
End: 2023-05-24
Payer: MEDICARE

## 2023-05-24 VITALS
OXYGEN SATURATION: 94 % | WEIGHT: 209 LBS | DIASTOLIC BLOOD PRESSURE: 80 MMHG | BODY MASS INDEX: 29.26 KG/M2 | HEIGHT: 71 IN | HEART RATE: 62 BPM | SYSTOLIC BLOOD PRESSURE: 138 MMHG

## 2023-05-24 DIAGNOSIS — J01.00 ACUTE NON-RECURRENT MAXILLARY SINUSITIS: ICD-10-CM

## 2023-05-24 DIAGNOSIS — J43.8 OTHER EMPHYSEMA (HCC): Primary | ICD-10-CM

## 2023-05-24 PROCEDURE — 3023F SPIROM DOC REV: CPT | Performed by: FAMILY MEDICINE

## 2023-05-24 PROCEDURE — 1036F TOBACCO NON-USER: CPT | Performed by: FAMILY MEDICINE

## 2023-05-24 PROCEDURE — 1123F ACP DISCUSS/DSCN MKR DOCD: CPT | Performed by: FAMILY MEDICINE

## 2023-05-24 PROCEDURE — 99213 OFFICE O/P EST LOW 20 MIN: CPT | Performed by: FAMILY MEDICINE

## 2023-05-24 PROCEDURE — 3079F DIAST BP 80-89 MM HG: CPT | Performed by: FAMILY MEDICINE

## 2023-05-24 PROCEDURE — G8427 DOCREV CUR MEDS BY ELIG CLIN: HCPCS | Performed by: FAMILY MEDICINE

## 2023-05-24 PROCEDURE — 3075F SYST BP GE 130 - 139MM HG: CPT | Performed by: FAMILY MEDICINE

## 2023-05-24 PROCEDURE — G8417 CALC BMI ABV UP PARAM F/U: HCPCS | Performed by: FAMILY MEDICINE

## 2023-05-24 RX ORDER — DOXYCYCLINE HYCLATE 100 MG/1
100 CAPSULE ORAL 2 TIMES DAILY
Qty: 20 CAPSULE | Refills: 0 | Status: SHIPPED | OUTPATIENT
Start: 2023-05-24 | End: 2023-06-03

## 2023-05-24 RX ORDER — ALBUTEROL SULFATE 90 UG/1
AEROSOL, METERED RESPIRATORY (INHALATION)
COMMUNITY
Start: 2023-05-22

## 2023-05-24 RX ORDER — PREDNISONE 10 MG/1
TABLET ORAL
COMMUNITY
Start: 2023-05-22

## 2023-05-24 ASSESSMENT — ENCOUNTER SYMPTOMS
WHEEZING: 0
RHINORRHEA: 0
COUGH: 0
SINUS PRESSURE: 1
DIARRHEA: 0
TROUBLE SWALLOWING: 0
DIFFICULTY BREATHING: 0
VOMITING: 0
EYE DISCHARGE: 0
SORE THROAT: 0
EYE REDNESS: 0
SHORTNESS OF BREATH: 0
FREQUENT THROAT CLEARING: 0

## 2023-05-24 ASSESSMENT — COPD QUESTIONNAIRES: COPD: 1

## 2023-05-24 NOTE — PROGRESS NOTES
HPI Notes    Name: Cassia Beard  : 1940        Chief Complaint:     Chief Complaint   Patient presents with    COPD     [facility] ER 23 f/u, COPD exacerbation. Treated on albuterol inhaler and prednisone 60 mg for 5 days. Sinusitis     C/o sinus pressure. History of Present Illness:     Cassia Beard is a 80 y.o.  male who presents with COPD [facility] ER 23 f/u, COPD exacerbation. Treated on albuterol inhaler and prednisone 60 mg for 5 days.) and Sinusitis (C/o sinus pressure. )      COPD  There is no cough, difficulty breathing, frequent throat clearing, shortness of breath or wheezing. Primary symptoms comments: Pt states he had been having sinus congestion and pain for about 3wks. Pt then used a sealant for [place] [place] but he was down in the pool. Pt got very SOB and ended up in the ER at [place] since he live in [facility]. . This is a new problem. The current episode started more than 1 month ago. The problem has been resolved (pt is feeling much better since given the steroids and then oral steroids to take home. ). Pertinent negatives include no chest pain, ear pain, headaches, rhinorrhea, sore throat or trouble swallowing. His symptoms are alleviated by oral steroids and beta-agonist (given prednisone 60mg po daily for 5d). Risk factors for lung disease include smoking/tobacco exposure (pt hasn't smoked in 20yrs). His past medical history is significant for COPD. Sinusitis  This is a new (pt states for a good 3wks he has sinus pressure and congestion.) problem. The current episode started 1 to 4 weeks ago. The problem is unchanged. There has been no fever. Associated symptoms include congestion and sinus pressure. Pertinent negatives include no chills, coughing, ear pain, headaches, shortness of breath or sore throat.  (Lt cheek sinus pressure and is hard to lie on the Lt side of the face) Treatments tried: pt was on Coricidin and recenlty on

## 2023-05-24 NOTE — PATIENT INSTRUCTIONS
Survey: You may be receiving a survey from [employer] regarding your visit today. You may get this in the mail, through your MyChart or in your email. Please complete the survey to enable us to provide the highest quality of care to you and your family. Please also, mention our names. If you cannot score us as very good (5 Stars) on any question, please feel free to call the office to discuss how we could have made your experience exceptional.      Thank You!         MD Parag Stanley LPN

## 2023-06-28 DIAGNOSIS — F34.1 DYSTHYMIC DISORDER: ICD-10-CM

## 2023-07-20 NOTE — PROGRESS NOTES
Runny nose, right ear pain x4 vazquez. Mother denies fevers or drainage. Subjective:      Patient ID: Maira Jimenez is a 68 y.o. male. HPI     Mr Nyla Reich returns for follow up dressing change after re excision L lower leg leiomyosarcoma biopsy site Nov 22, 2017, with Apligraf application 3:1 split thickness. Doing well. Mild to moderate wound discomfort, managed with 1 Norco every 6 hours. No fevers nor chills. On BID Lovenox. Review of Systems     Past Medical History:   Diagnosis Date    [facility])     left lower leg    COPD (chronic obstructive pulmonary disease) ([facility] [address]     Hyperlipidemia     Hypertension     Migraine        Past Surgical History:   Procedure Laterality Date    EXCISION / BIOPSY SKIN LESION OF LEG Left 11/13/2017    LEG LESION BIOPSY EXCISION-EXCISION LEFT LEG LESION performed by Hubert Chan MD at [facility] / BIOPSY SKIN LESION OF LEG  11/22/2017    LEG LESION BIOPSY EXCISION performed by Hubert Chan MD at [address]      SKIN GRAFT Left 11/22/2017    SKIN GRAFT SPLIT THICKNESS performed by Hubert Chan MD at [address] [facility]         History reviewed. No pertinent family history. Allergies:  See list    Current Outpatient Prescriptions   Medication Sig Dispense Refill    HYDROcodone-acetaminophen (NORCO) 5-325 MG per tablet Take 1 or 2 tablets po every 6 hours as needed for pain. . 20 tablet 0    enoxaparin (LOVENOX) 40 MG/0.4ML injection Inject 0.4 mLs into the skin daily for 14 days 1 Syringe 1    cephALEXin (KEFLEX) 500 MG capsule Take 1 capsule by mouth 4 times daily for 7 days 28 capsule 0    HYDROcodone-acetaminophen (NORCO) 5-325 MG per tablet Take 1 or 2 tablets po every 6 hours as needed for pain. . 30 tablet 0    HYDROcodone-acetaminophen (NORCO) 5-325 MG per tablet Take 1 or 2 tablets po every 6 hours as needed for pain. . 20 tablet 0    lisinopril (ZESTRIL) 30 MG tablet Take 1 tablet by mouth daily 90 tablet 3    lovastatin (MEVACOR) 40 MG tablet Take 1 tablet by mouth nightly 90 tablet 3    propranolol (INDERAL LA) 80 MG extended release capsule Take 1 capsule by mouth daily 90 capsule 3    sertraline (ZOLOFT) 50 MG tablet Take 1 tablet by mouth daily 90 tablet 3    amLODIPine (NORVASC) 5 MG tablet Take 1 tablet by mouth 2 times daily 180 tablet 3    tiotropium (SPIRIVA HANDIHALER) 18 MCG inhalation capsule Inhale 1 capsule into the lungs daily 90 capsule 3    Multiple Vitamin (MULTI VITAMIN DAILY PO) Take by mouth daily      albuterol (PROVENTIL) (2.5 MG/3ML) 0.083% nebulizer solution Take 3 mLs by nebulization every 6 hours as needed for Wheezing 120 each 11    albuterol sulfate HFA (PROAIR HFA) 108 (90 BASE) MCG/ACT inhaler Inhale 2 puffs into the lungs every 6 hours as needed for Wheezing 1 Inhaler 3    Psyllium (METAMUCIL FIBER SINGLES PO) Take  by mouth daily.  Docusate Calcium (STOOL SOFTENER PO) Take  by mouth daily.  aspirin 81 MG EC tablet Take 81 mg by mouth daily. No current facility-administered medications for this visit. Social History     Social History    Marital status:      Spouse name: N/A    Number of children: N/A    Years of education: N/A     Social History Main Topics    Smoking status: Former Smoker     Quit date: 8/15/2003    Smokeless tobacco: Never Used    Alcohol use 0.6 oz/week     1 Cans of beer per week      Comment: occasional    Drug use: No    Sexual activity: Not Currently     Other Topics Concern    None     Social History Narrative    None       Objective:   Physical Exam     WD/WN, NAD. Alert, appropriate. Wound is clean, beginning to granulate. No infection. Graft viable, with some contraction with pull back inferiorly.      Dressing changed with [employer], bacitracin, Kerlix/saline, ACE.    11/27/2017 12:09 PM - Reggie Parsons Incoming Lab Results From [employer]     Component Results     Component Collected Lab   Surgical Pathology Report 11/22/2017  9:46 AM [facility]   (NOTE)   [id number]   [facility]   [facility]   ANATOMIC PATHOLOGY   [address] [place] [address], [facility] [address], [address]   [phone]   Fax: [phone]     [facility]     Patient Name: Saul Finley [facility] Rec: [id number]   Path Number: [id number]   Collected: 11/22/2017   Received: 11/24/2017   Reported: 11/27/2017 12:08     -- Diagnosis --   1.  SKIN AND SUBCUTANEOUS TISSUE, LEFT LOWER LEG, EXCISION OF PRIOR   SURGICAL SITE:   - HEALING PRIOR SURGICAL SITE CHANGES.   - BENIGN SKIN.   - NO EVIDENCE OF RESIDUAL NEOPLASM IS IDENTIFIED (NEGATIVE MARGINS). 2.  LEFT LOWER LEG, SOFT TISSUE (GASTROCNEMIUS FASCIA, POSTERIOR   MARGIN), EXCISION:   - BENIGN SOFT TISSUE WITH HEALING PRIOR SURGICAL SITE CHANGES.   - NO EVIDENCE OF RESIDUAL NEOPLASM IS IDENTIFIED (NEGATIVE MARGIN). Prachi Rock. Rufus Gauthier,   **Electronically Signed Out**         sls/11/27/2017       Clinical Information   Pre-op Diagnosis:  LLE LEIOMYOSARCOMA   Operative Findings:  LEFT LOWER LEG TISSUE #1; LEFT LOWER LEG TISSUE   #2 (GASTROC-FASCIA, POSTERIOR MARGIN)  SUTURE MARKERS: SHORT   BO-SUPERIOR/LONG POSTERO-MEDIAL   Operation Performed: [employer] GRAFT SPLIT THICKNESS     Source of Specimen   1: LEFT LOWER LEG TISSUE #1   2: LEFT LOWER LEG TISSUE #2     Gross Description   1. \"RU CRUMP, LEFT LOWER LEG TISSUE #1 SUTURE SARITHA SHORT   BO-SUPERIOR/\" A 9.6 x 4.6 x 1.5 (depth) cm oriented ovoid portion   of tan-brown skin.  On the skin surface is a 7.0 cm sutured incision.    There is a short suture designating anterosuperior and a long suture   posteromedial.  The anterosuperior half is inked blue and   posteroinferior half black.  Sectioning reveals fatty cut surfaces   with slight hemorrhage at the deep margin.  Cassette summary: \"A-G\"   long stitch placed at 12 o'clock with peripheral margins radially   submitted, 12-3 (short suture) in \"A,\" 3-6 in \"B-C,\" 6-9 in \"D-E\" and   9-12 in \"F-G,\" \"H-J\" centered sutured incision representative. 2. \"RU [place], LEFT LOWER LEG TISSUE #2 (GASTROC-FASCIA POSTERIOR   MARGIN)  SUTURE MARKER\" A 6.2 x 2.5 x 0.4 (thickness) oriented portion   of fibrofatty tissue.  On one flat surface there are staples and this   surface is inked blue and the opposite surface black.  The edge   corresponding to the short suture is tagged red and opposite edge   green.  Sectioning reveals unremarkable cut surfaces.  Cassette   summary: \"A-E\" specimen entirely serially submitted from the edge   closest to the long suture to the opposite edge.  as       Microscopic Description   1, 2.  Healing prior surgical changes are present in each specimen. The overlying skin and subcutaneous tissue in specimen 1 are   unremarkable. Benjamen Lipps is no evidence of residual neoplasm. Assessment:      1. Postop check  HYDROcodone-acetaminophen (NORCO) 5-325 MG per tablet    enoxaparin (LOVENOX) 40 MG/0.4ML injection    cephALEXin (KEFLEX) 500 MG capsule   2. Leiomyosarcoma of leg, left [facility])           Plan:      Mr Cleveland is doing very well. Pathology showing wide, clear margins without any residual malignancy has been reviewed with patient and his wife. Apligraf granulating well. Dressing changed, Adaptec, Bacitracin, Kerlix/saline, ACE. Will plan next dressing change in one week. Continue Keflex, Norco, Lovenox, leg elevation. Will refer to Dr Lidya Bruce for review of pathology. karina neff

## 2023-07-24 DIAGNOSIS — I10 ESSENTIAL HYPERTENSION, BENIGN: ICD-10-CM

## 2023-07-24 RX ORDER — AMLODIPINE BESYLATE 5 MG/1
TABLET ORAL
Qty: 180 TABLET | Refills: 0 | Status: SHIPPED | OUTPATIENT
Start: 2023-07-24

## 2023-07-24 RX ORDER — PROPRANOLOL HYDROCHLORIDE 80 MG/1
CAPSULE, EXTENDED RELEASE ORAL
Qty: 90 CAPSULE | Refills: 0 | Status: SHIPPED | OUTPATIENT
Start: 2023-07-24

## 2023-07-24 NOTE — TELEPHONE ENCOUNTER
Last OV: 5/24/2023  Last RX: 1/31/2023, 5/8/2023   Next scheduled apt: 9/28/2023        Surescripts requesting refill

## 2023-08-29 RX ORDER — LOVASTATIN 40 MG/1
40 TABLET ORAL NIGHTLY
Qty: 90 TABLET | Refills: 1 | Status: SHIPPED | OUTPATIENT
Start: 2023-08-29

## 2023-08-29 NOTE — TELEPHONE ENCOUNTER
Last OV: 5/24/2023 03/29/23 AWV   Last RX:    Next scheduled apt: 9/28/2023  6 months chronic           Surescript requesting a refill

## 2023-09-19 DIAGNOSIS — F34.1 DYSTHYMIC DISORDER: ICD-10-CM

## 2023-09-19 NOTE — TELEPHONE ENCOUNTER
Last OV: 5/24/2023  EDFU   03/29/23 AWV   Last RX:    Next scheduled apt: 9/28/2023  6 MONTHS CHRONIC             Surescript requesting a refill

## 2023-09-27 DIAGNOSIS — I10 ESSENTIAL HYPERTENSION, BENIGN: ICD-10-CM

## 2023-09-27 RX ORDER — LISINOPRIL 30 MG/1
TABLET ORAL
Qty: 90 TABLET | Refills: 1 | Status: SHIPPED | OUTPATIENT
Start: 2023-09-27

## 2023-09-27 NOTE — TELEPHONE ENCOUNTER
Last OV 5/24/23 for emphysema, sinusitis  Requesting refill on lisinopril thru [facility] script  Next OV 9/28/23

## 2023-09-28 ENCOUNTER — OFFICE VISIT (OUTPATIENT)
Dept: FAMILY MEDICINE CLINIC | Age: 83
End: 2023-09-28
Payer: MEDICARE

## 2023-09-28 VITALS
HEART RATE: 60 BPM | BODY MASS INDEX: 29.29 KG/M2 | DIASTOLIC BLOOD PRESSURE: 72 MMHG | WEIGHT: 210 LBS | SYSTOLIC BLOOD PRESSURE: 136 MMHG | OXYGEN SATURATION: 95 %

## 2023-09-28 DIAGNOSIS — E78.00 PURE HYPERCHOLESTEROLEMIA: ICD-10-CM

## 2023-09-28 DIAGNOSIS — J43.8 OTHER EMPHYSEMA (HCC): Primary | ICD-10-CM

## 2023-09-28 DIAGNOSIS — F34.1 DYSTHYMIC DISORDER: ICD-10-CM

## 2023-09-28 DIAGNOSIS — I10 ESSENTIAL HYPERTENSION, BENIGN: ICD-10-CM

## 2023-09-28 PROCEDURE — 3023F SPIROM DOC REV: CPT | Performed by: FAMILY MEDICINE

## 2023-09-28 PROCEDURE — 3288F FALL RISK ASSESSMENT DOCD: CPT | Performed by: FAMILY MEDICINE

## 2023-09-28 PROCEDURE — 1123F ACP DISCUSS/DSCN MKR DOCD: CPT | Performed by: FAMILY MEDICINE

## 2023-09-28 PROCEDURE — 1036F TOBACCO NON-USER: CPT | Performed by: FAMILY MEDICINE

## 2023-09-28 PROCEDURE — 0518F FALL PLAN OF CARE DOCD: CPT | Performed by: FAMILY MEDICINE

## 2023-09-28 PROCEDURE — 3078F DIAST BP <80 MM HG: CPT | Performed by: FAMILY MEDICINE

## 2023-09-28 PROCEDURE — 3075F SYST BP GE 130 - 139MM HG: CPT | Performed by: FAMILY MEDICINE

## 2023-09-28 PROCEDURE — 99213 OFFICE O/P EST LOW 20 MIN: CPT | Performed by: FAMILY MEDICINE

## 2023-09-28 PROCEDURE — G8427 DOCREV CUR MEDS BY ELIG CLIN: HCPCS | Performed by: FAMILY MEDICINE

## 2023-09-28 PROCEDURE — G8417 CALC BMI ABV UP PARAM F/U: HCPCS | Performed by: FAMILY MEDICINE

## 2023-09-28 ASSESSMENT — ENCOUNTER SYMPTOMS
WHEEZING: 0
RHINORRHEA: 0
SORE THROAT: 0
BLOOD IN STOOL: 0
VOMITING: 0
HEMOPTYSIS: 0
ABDOMINAL PAIN: 0
COUGH: 0
DIARRHEA: 0
CHEST TIGHTNESS: 0
NAUSEA: 0
EYE REDNESS: 0
EYE DISCHARGE: 0
DIFFICULTY BREATHING: 0
SHORTNESS OF BREATH: 0
TROUBLE SWALLOWING: 0
HEARTBURN: 0

## 2023-09-28 ASSESSMENT — COPD QUESTIONNAIRES: COPD: 1

## 2023-09-28 NOTE — PROGRESS NOTES
HPI Notes    Name: Sam Stubbs  : 1940        Chief Complaint:     Chief Complaint   Patient presents with    COPD     Patient here today for 6 month follow up. Hypertension    Hyperlipidemia    Mental Health Problem       History of Present Illness:     Sam Stubbs is a 80 y.o.  male who presents with COPD (Patient here today for 6 month follow up. ), Hypertension, Hyperlipidemia, and Mental Health Problem      COPD  There is no chest tightness, cough, difficulty breathing, hemoptysis, shortness of breath or wheezing. Primary symptoms comments: Pt is doing well and feels breathing is good. Pt is barely needing his inhaler. . This is a chronic problem. The current episode started more than 1 year ago. The problem has been unchanged. Pertinent negatives include no appetite change, chest pain, ear congestion, ear pain, fever, headaches, heartburn, malaise/fatigue, postnasal drip, rhinorrhea, sneezing, sore throat or trouble swallowing. His symptoms are aggravated by URI. His symptoms are alleviated by beta-agonist. He reports significant improvement on treatment. Risk factors: pt no longer smoking. His past medical history is significant for COPD. Hypertension  This is a chronic problem. The current episode started more than 1 year ago. The problem is unchanged. Pertinent negatives include no chest pain, headaches, malaise/fatigue, palpitations, peripheral edema or shortness of breath. There are no associated agents to hypertension. Risk factors for coronary artery disease include dyslipidemia and male gender. The current treatment provides significant improvement. Hyperlipidemia  This is a chronic problem. The current episode started more than 1 year ago. The problem is controlled. Recent lipid tests were reviewed and are normal. He has no history of diabetes or hypothyroidism. Pertinent negatives include no chest pain or shortness of breath.  Current antihyperlipidemic treatment includes

## 2023-09-28 NOTE — PATIENT INSTRUCTIONS
SURVEY:    You may be receiving a survey from [employer] regarding your visit today. Please complete the survey to enable us to provide the highest quality of care to you and your family. If you cannot score us a very good on any question, please call the office to discuss how we could have made your experience a very good one. Thank you.

## 2023-10-03 ENCOUNTER — OFFICE VISIT (OUTPATIENT)
Dept: FAMILY MEDICINE CLINIC | Age: 83
End: 2023-10-03
Payer: MEDICARE

## 2023-10-03 VITALS
SYSTOLIC BLOOD PRESSURE: 132 MMHG | HEIGHT: 71 IN | HEART RATE: 68 BPM | DIASTOLIC BLOOD PRESSURE: 78 MMHG | WEIGHT: 211 LBS | BODY MASS INDEX: 29.54 KG/M2

## 2023-10-03 DIAGNOSIS — L98.9 SKIN LESION OF LEFT LEG: Primary | ICD-10-CM

## 2023-10-03 PROCEDURE — 3288F FALL RISK ASSESSMENT DOCD: CPT | Performed by: FAMILY MEDICINE

## 2023-10-03 PROCEDURE — 3078F DIAST BP <80 MM HG: CPT | Performed by: FAMILY MEDICINE

## 2023-10-03 PROCEDURE — 0518F FALL PLAN OF CARE DOCD: CPT | Performed by: FAMILY MEDICINE

## 2023-10-03 PROCEDURE — 3075F SYST BP GE 130 - 139MM HG: CPT | Performed by: FAMILY MEDICINE

## 2023-10-03 PROCEDURE — G8417 CALC BMI ABV UP PARAM F/U: HCPCS | Performed by: FAMILY MEDICINE

## 2023-10-03 PROCEDURE — 1123F ACP DISCUSS/DSCN MKR DOCD: CPT | Performed by: FAMILY MEDICINE

## 2023-10-03 PROCEDURE — 1036F TOBACCO NON-USER: CPT | Performed by: FAMILY MEDICINE

## 2023-10-03 PROCEDURE — 99213 OFFICE O/P EST LOW 20 MIN: CPT | Performed by: FAMILY MEDICINE

## 2023-10-03 PROCEDURE — G8484 FLU IMMUNIZE NO ADMIN: HCPCS | Performed by: FAMILY MEDICINE

## 2023-10-03 PROCEDURE — G8427 DOCREV CUR MEDS BY ELIG CLIN: HCPCS | Performed by: FAMILY MEDICINE

## 2023-10-03 RX ORDER — CEPHALEXIN 500 MG/1
500 CAPSULE ORAL 3 TIMES DAILY
Qty: 21 CAPSULE | Refills: 0 | Status: SHIPPED | OUTPATIENT
Start: 2023-10-03 | End: 2023-10-10

## 2023-10-03 ASSESSMENT — ENCOUNTER SYMPTOMS
DIARRHEA: 0
VOMITING: 0
NAUSEA: 0

## 2023-10-03 NOTE — PROGRESS NOTES
Noelle Gilliland MD   sertraline (ZOLOFT) 50 MG tablet Take 1 tablet by mouth once daily 9/19/23   Efe Syed MD   lovastatin (MEVACOR) 40 MG tablet Take 1 tablet by mouth nightly 8/29/23   Efe Syed MD   propranolol (INDERAL LA) 80 MG extended release capsule Take 1 capsule by mouth once daily 7/24/23   Efe Syed MD   amLODIPine (NORVASC) 5 MG tablet Take 1 tablet by mouth twice daily 7/24/23   Efe Syed MD   albuterol sulfate HFA (PROVENTIL;VENTOLIN;PROAIR) 108 (90 Base) MCG/ACT inhaler INHALE 2 PUFFS BY MOUTH EVERY 4 TO 6 HOURS AS NEEDED FOR SHORTNESS OF BREATH OR WHEEZING 5/22/23   ProviderUday MD   albuterol (PROVENTIL) (2.5 MG/3ML) 0.083% nebulizer solution Take 3 mLs by nebulization every 6 hours as needed for Wheezing 3/29/23   Efe Syed MD   aspirin 81 MG EC tablet 1 tablet    ProviderUday MD   Docusate Calcium (STOOL SOFTENER PO) Take  by mouth daily. Provider, MD Uday        Allergies:       Patient has no known allergies. Social History:     Tobacco:    reports that he quit smoking about 20 years ago. His smoking use included cigarettes. He started smoking about 64 years ago. He has a 216.00 pack-year smoking history. He has been exposed to tobacco smoke. He has never used smokeless tobacco.  Alcohol:      reports current alcohol use of about 1.0 standard drink of alcohol per week. Drug Use:  reports no history of drug use. Family History:     History reviewed. No pertinent family history. Review of Systems:       Review of Systems   Constitutional:  Negative for chills and fever. Gastrointestinal:  Negative for diarrhea, nausea and vomiting. Skin:  Negative for rash and wound. Physical Exam:     Physical Exam  Constitutional:       General: He is not in acute distress. Appearance: Normal appearance. He is not ill-appearing. Skin:     Findings: Lesion present. No erythema or rash.       Comments: LT anterior lower leg

## 2023-10-19 ENCOUNTER — OFFICE VISIT (OUTPATIENT)
Dept: FAMILY MEDICINE CLINIC | Age: 83
End: 2023-10-19
Payer: MEDICARE

## 2023-10-19 VITALS
HEART RATE: 91 BPM | BODY MASS INDEX: 29.43 KG/M2 | SYSTOLIC BLOOD PRESSURE: 142 MMHG | DIASTOLIC BLOOD PRESSURE: 70 MMHG | OXYGEN SATURATION: 95 % | WEIGHT: 211 LBS

## 2023-10-19 DIAGNOSIS — R09.82 POSTNASAL DRIP: ICD-10-CM

## 2023-10-19 DIAGNOSIS — J06.9 VIRAL URI: ICD-10-CM

## 2023-10-19 DIAGNOSIS — R05.9 COUGH IN ADULT: Primary | ICD-10-CM

## 2023-10-19 PROCEDURE — 99213 OFFICE O/P EST LOW 20 MIN: CPT | Performed by: STUDENT IN AN ORGANIZED HEALTH CARE EDUCATION/TRAINING PROGRAM

## 2023-10-19 PROCEDURE — G8417 CALC BMI ABV UP PARAM F/U: HCPCS | Performed by: STUDENT IN AN ORGANIZED HEALTH CARE EDUCATION/TRAINING PROGRAM

## 2023-10-19 PROCEDURE — G8427 DOCREV CUR MEDS BY ELIG CLIN: HCPCS | Performed by: STUDENT IN AN ORGANIZED HEALTH CARE EDUCATION/TRAINING PROGRAM

## 2023-10-19 PROCEDURE — 1036F TOBACCO NON-USER: CPT | Performed by: STUDENT IN AN ORGANIZED HEALTH CARE EDUCATION/TRAINING PROGRAM

## 2023-10-19 PROCEDURE — 3288F FALL RISK ASSESSMENT DOCD: CPT | Performed by: STUDENT IN AN ORGANIZED HEALTH CARE EDUCATION/TRAINING PROGRAM

## 2023-10-19 PROCEDURE — 0518F FALL PLAN OF CARE DOCD: CPT | Performed by: STUDENT IN AN ORGANIZED HEALTH CARE EDUCATION/TRAINING PROGRAM

## 2023-10-19 PROCEDURE — 3077F SYST BP >= 140 MM HG: CPT | Performed by: STUDENT IN AN ORGANIZED HEALTH CARE EDUCATION/TRAINING PROGRAM

## 2023-10-19 PROCEDURE — G8484 FLU IMMUNIZE NO ADMIN: HCPCS | Performed by: STUDENT IN AN ORGANIZED HEALTH CARE EDUCATION/TRAINING PROGRAM

## 2023-10-19 PROCEDURE — 3078F DIAST BP <80 MM HG: CPT | Performed by: STUDENT IN AN ORGANIZED HEALTH CARE EDUCATION/TRAINING PROGRAM

## 2023-10-19 PROCEDURE — 1123F ACP DISCUSS/DSCN MKR DOCD: CPT | Performed by: STUDENT IN AN ORGANIZED HEALTH CARE EDUCATION/TRAINING PROGRAM

## 2023-10-19 ASSESSMENT — ENCOUNTER SYMPTOMS
BACK PAIN: 0
COUGH: 1
NAUSEA: 0
DIARRHEA: 0
RHINORRHEA: 0
SINUS PRESSURE: 0
VOMITING: 0
ABDOMINAL PAIN: 0
WHEEZING: 1
SORE THROAT: 0
SINUS PAIN: 0

## 2023-10-19 NOTE — PATIENT INSTRUCTIONS
SURVEY:    You may be receiving a survey from [employer] regarding your visit today. You may get this in the mail, through your MyChart or in your email. Please complete the survey to enable us to provide the highest quality of care to you and your family. If you cannot score us as very good ( 5 Stars) on any question, please feel free to call the office to discuss how we could have made your experience exceptional.     Thank you.     Clinical Care Team:  DO Elvira Dominique LPN    Triage:  Carlos Van, [address] [facility] Team:  Ivette Bloom

## 2023-10-19 NOTE — PROGRESS NOTES
[address]    Electronically signed by Art Officer on 10/19/2023 at 2:51 PM     Completed Refills   Requested Prescriptions      No prescriptions requested or ordered in this encounter

## 2023-11-06 DIAGNOSIS — I10 ESSENTIAL HYPERTENSION, BENIGN: ICD-10-CM

## 2023-11-06 RX ORDER — AMLODIPINE BESYLATE 5 MG/1
TABLET ORAL
Qty: 180 TABLET | Refills: 1 | Status: SHIPPED | OUTPATIENT
Start: 2023-11-06

## 2023-11-06 RX ORDER — PROPRANOLOL HYDROCHLORIDE 80 MG/1
CAPSULE, EXTENDED RELEASE ORAL
Qty: 90 CAPSULE | Refills: 1 | Status: SHIPPED | OUTPATIENT
Start: 2023-11-06

## 2023-11-06 NOTE — TELEPHONE ENCOUNTER
Last OV: 10/19/2023  Last RX: 7/24/2023   Next scheduled apt: 4/2/2024      Surescripts requesting refill

## 2024-02-19 ENCOUNTER — HOSPITAL ENCOUNTER (OUTPATIENT)
Age: 84
End: 2024-02-19
Payer: MEDICARE

## 2024-02-26 ENCOUNTER — HOSPITAL ENCOUNTER (OUTPATIENT)
Age: 84
Discharge: HOME | End: 2024-02-26
Payer: MEDICARE

## 2024-02-26 ENCOUNTER — HOSPITAL ENCOUNTER (OUTPATIENT)
Dept: CT IMAGING | Age: 84
Discharge: HOME | End: 2024-02-28
Payer: MEDICARE

## 2024-02-26 DIAGNOSIS — C49.22 LEIOMYOSARCOMA OF LEG, LEFT (HCC): ICD-10-CM

## 2024-02-26 DIAGNOSIS — R91.8 PULMONARY NODULES: ICD-10-CM

## 2024-02-26 LAB
ALBUMIN SERPL-MCNC: 4.2 G/DL (ref 3.5–5.2)
ALBUMIN/GLOB SERPL: 1.3 {RATIO} (ref 1–2.5)
ALP SERPL-CCNC: 74 U/L (ref 40–129)
ALT SERPL-CCNC: 15 U/L (ref 5–41)
ANION GAP SERPL CALCULATED.3IONS-SCNC: 9 MMOL/L (ref 9–17)
AST SERPL-CCNC: 19 U/L
BASOPHILS # BLD: 0.08 K/UL (ref 0–0.2)
BASOPHILS NFR BLD: 1 % (ref 0–2)
BILIRUB SERPL-MCNC: 0.3 MG/DL (ref 0.3–1.2)
BUN SERPL-MCNC: 20 MG/DL (ref 8–23)
BUN/CREAT SERPL: 22 (ref 9–20)
CALCIUM SERPL-MCNC: 9.2 MG/DL (ref 8.6–10.4)
CHLORIDE SERPL-SCNC: 101 MMOL/L (ref 98–107)
CO2 SERPL-SCNC: 27 MMOL/L (ref 20–31)
CREAT SERPL-MCNC: 0.9 MG/DL (ref 0.7–1.2)
EOSINOPHIL # BLD: 0.39 K/UL (ref 0–0.44)
EOSINOPHILS RELATIVE PERCENT: 4 % (ref 1–4)
ERYTHROCYTE [DISTWIDTH] IN BLOOD BY AUTOMATED COUNT: 11.9 % (ref 11.8–14.4)
GFR SERPL CREATININE-BSD FRML MDRD: >60 ML/MIN/1.73M2
GLUCOSE SERPL-MCNC: 143 MG/DL (ref 70–99)
HCT VFR BLD AUTO: 47.3 % (ref 40.7–50.3)
HGB BLD-MCNC: 15.6 G/DL (ref 13–17)
IMM GRANULOCYTES # BLD AUTO: <0.03 K/UL (ref 0–0.3)
IMM GRANULOCYTES NFR BLD: 0 %
LYMPHOCYTES NFR BLD: 2.67 K/UL (ref 1.1–3.7)
LYMPHOCYTES RELATIVE PERCENT: 27 % (ref 24–43)
MCH RBC QN AUTO: 31.1 PG (ref 25.2–33.5)
MCHC RBC AUTO-ENTMCNC: 33 G/DL (ref 28.4–34.8)
MCV RBC AUTO: 94.4 FL (ref 82.6–102.9)
MONOCYTES NFR BLD: 0.75 K/UL (ref 0.1–1.2)
MONOCYTES NFR BLD: 8 % (ref 3–12)
NEUTROPHILS NFR BLD: 60 % (ref 36–65)
NEUTS SEG NFR BLD: 5.88 K/UL (ref 1.5–8.1)
NRBC BLD-RTO: 0 PER 100 WBC
PLATELET # BLD AUTO: 249 K/UL (ref 138–453)
PMV BLD AUTO: 10.7 FL (ref 8.1–13.5)
POTASSIUM SERPL-SCNC: 4.3 MMOL/L (ref 3.7–5.3)
PROT SERPL-MCNC: 7.5 G/DL (ref 6.4–8.3)
RBC # BLD AUTO: 5.01 M/UL (ref 4.21–5.77)
SODIUM SERPL-SCNC: 137 MMOL/L (ref 135–144)
WBC OTHER # BLD: 9.8 K/UL (ref 3.5–11.3)

## 2024-02-26 PROCEDURE — 36415 COLL VENOUS BLD VENIPUNCTURE: CPT

## 2024-02-26 PROCEDURE — 85025 COMPLETE CBC W/AUTO DIFF WBC: CPT

## 2024-02-26 PROCEDURE — 71260 CT THORAX DX C+: CPT

## 2024-02-26 PROCEDURE — 80053 COMPREHEN METABOLIC PANEL: CPT

## 2024-02-26 PROCEDURE — 6360000004 HC RX CONTRAST MEDICATION: Performed by: INTERNAL MEDICINE

## 2024-02-26 RX ORDER — LOVASTATIN 40 MG/1
40 TABLET ORAL NIGHTLY
Qty: 90 TABLET | Refills: 1 | Status: SHIPPED | OUTPATIENT
Start: 2024-02-26

## 2024-02-26 RX ADMIN — IOPAMIDOL 75 ML: 755 INJECTION, SOLUTION INTRAVENOUS at 15:00

## 2024-03-06 ENCOUNTER — OFFICE VISIT (OUTPATIENT)
Dept: ONCOLOGY | Age: 84
End: 2024-03-06
Payer: MEDICARE

## 2024-03-06 VITALS
RESPIRATION RATE: 18 BRPM | WEIGHT: 215 LBS | SYSTOLIC BLOOD PRESSURE: 135 MMHG | HEART RATE: 69 BPM | BODY MASS INDEX: 29.99 KG/M2 | DIASTOLIC BLOOD PRESSURE: 78 MMHG | TEMPERATURE: 97.3 F

## 2024-03-06 DIAGNOSIS — C49.22 LEIOMYOSARCOMA OF LEG, LEFT (HCC): Primary | ICD-10-CM

## 2024-03-06 DIAGNOSIS — R91.8 PULMONARY NODULES: ICD-10-CM

## 2024-03-06 PROCEDURE — 1123F ACP DISCUSS/DSCN MKR DOCD: CPT | Performed by: INTERNAL MEDICINE

## 2024-03-06 PROCEDURE — 99214 OFFICE O/P EST MOD 30 MIN: CPT | Performed by: INTERNAL MEDICINE

## 2024-03-06 PROCEDURE — G8427 DOCREV CUR MEDS BY ELIG CLIN: HCPCS | Performed by: INTERNAL MEDICINE

## 2024-03-06 PROCEDURE — G8484 FLU IMMUNIZE NO ADMIN: HCPCS | Performed by: INTERNAL MEDICINE

## 2024-03-06 PROCEDURE — G8417 CALC BMI ABV UP PARAM F/U: HCPCS | Performed by: INTERNAL MEDICINE

## 2024-03-06 PROCEDURE — 0518F FALL PLAN OF CARE DOCD: CPT | Performed by: INTERNAL MEDICINE

## 2024-03-06 PROCEDURE — 3075F SYST BP GE 130 - 139MM HG: CPT | Performed by: INTERNAL MEDICINE

## 2024-03-06 PROCEDURE — 99211 OFF/OP EST MAY X REQ PHY/QHP: CPT | Performed by: INTERNAL MEDICINE

## 2024-03-06 PROCEDURE — 3078F DIAST BP <80 MM HG: CPT | Performed by: INTERNAL MEDICINE

## 2024-03-06 PROCEDURE — 1036F TOBACCO NON-USER: CPT | Performed by: INTERNAL MEDICINE

## 2024-03-06 PROCEDURE — 3288F FALL RISK ASSESSMENT DOCD: CPT | Performed by: INTERNAL MEDICINE

## 2024-03-06 NOTE — PROGRESS NOTES
and atraumatic. Pupils are equal, round, reactive to light and accommodation. Extraocular muscles are intact. Neck: Showed no JVD, no carotid bruit . Lungs: Clear to auscultation bilaterally. Heart: Regular without any murmur. Abdomen: Soft, nontender. No hepatosplenomegaly. Extremities: Lower extremities show no edema, clubbing, or cyanosis.  He has a well-healed skin graft in the left shin.  No evidence of recurrence breasts: Examination not done today. Neuro exam: intact cranial nerves bilaterally no motor or sensory deficit, gait is normal. Lymphatic: no adenopathy appreciated in the supraclavicular, axillary, cervical or inguinal area    Review of radiological studies:     Review of laboratory data:     IMPRESSION:   History of leiomyosarcoma of the left lower extremity, completely removed  COPD  In remission, surveillance  Plan:   Patient continues to do well with no evidence of recurrence.    I reviewed the labs and CT scan with the patient and his wife.  There is no evidence of relapse or any abnormalities.    We again discussed the nature of this cancer and management plans.    Continue surveillance.    We will see him in 1 year with repeated chest CT scan and labs.  Sooner for any problems.    Patient's questions were answered to the best of his satisfaction and he verbalized full understanding and agreement.                          Tyson Garcia MD                          [facility]                            This note is created with the assistance of a speech recognition program.  While intending to generate a document that actually reflects the content of the visit, the document can still have some errors including those of syntax and sound a like substitutions which may escape proof reading.  It such instances, actual meaning can be extrapolated by contextual diversion.

## 2024-03-15 DIAGNOSIS — F34.1 DYSTHYMIC DISORDER: ICD-10-CM

## 2024-03-18 NOTE — TELEPHONE ENCOUNTER
Last OV 10/19/23 acute, 10/3/23     Next OV 4/2/24    Requesting refill on sertraline thru [facility]     Rx pending

## 2024-03-27 DIAGNOSIS — I10 ESSENTIAL HYPERTENSION, BENIGN: ICD-10-CM

## 2024-03-27 RX ORDER — LISINOPRIL 30 MG/1
TABLET ORAL
Qty: 90 TABLET | Refills: 1 | Status: SHIPPED | OUTPATIENT
Start: 2024-03-27

## 2024-03-27 NOTE — TELEPHONE ENCOUNTER
Last OV: 10/19/2023   09/28/23 chronic   Last RX:    Next scheduled apt: 4/2/2024  6 months chronic       Surescript requesting a refill

## 2024-04-02 ENCOUNTER — OFFICE VISIT (OUTPATIENT)
Dept: FAMILY MEDICINE CLINIC | Age: 84
End: 2024-04-02
Payer: MEDICARE

## 2024-04-02 VITALS
HEART RATE: 80 BPM | BODY MASS INDEX: 29.68 KG/M2 | DIASTOLIC BLOOD PRESSURE: 70 MMHG | WEIGHT: 212 LBS | HEIGHT: 71 IN | OXYGEN SATURATION: 96 % | SYSTOLIC BLOOD PRESSURE: 132 MMHG

## 2024-04-02 DIAGNOSIS — E78.00 PURE HYPERCHOLESTEROLEMIA: ICD-10-CM

## 2024-04-02 DIAGNOSIS — Z00.00 MEDICARE ANNUAL WELLNESS VISIT, SUBSEQUENT: Primary | ICD-10-CM

## 2024-04-02 DIAGNOSIS — I10 ESSENTIAL HYPERTENSION, BENIGN: ICD-10-CM

## 2024-04-02 DIAGNOSIS — F34.1 DYSTHYMIC DISORDER: ICD-10-CM

## 2024-04-02 DIAGNOSIS — J43.8 OTHER EMPHYSEMA (HCC): ICD-10-CM

## 2024-04-02 PROCEDURE — G0439 PPPS, SUBSEQ VISIT: HCPCS | Performed by: FAMILY MEDICINE

## 2024-04-02 PROCEDURE — 3075F SYST BP GE 130 - 139MM HG: CPT | Performed by: FAMILY MEDICINE

## 2024-04-02 PROCEDURE — 1123F ACP DISCUSS/DSCN MKR DOCD: CPT | Performed by: FAMILY MEDICINE

## 2024-04-02 PROCEDURE — 3078F DIAST BP <80 MM HG: CPT | Performed by: FAMILY MEDICINE

## 2024-04-02 SDOH — ECONOMIC STABILITY: FOOD INSECURITY: WITHIN THE PAST 12 MONTHS, YOU WORRIED THAT YOUR FOOD WOULD RUN OUT BEFORE YOU GOT MONEY TO BUY MORE.: NEVER TRUE

## 2024-04-02 SDOH — ECONOMIC STABILITY: INCOME INSECURITY: HOW HARD IS IT FOR YOU TO PAY FOR THE VERY BASICS LIKE FOOD, HOUSING, MEDICAL CARE, AND HEATING?: NOT HARD AT ALL

## 2024-04-02 SDOH — ECONOMIC STABILITY: FOOD INSECURITY: WITHIN THE PAST 12 MONTHS, THE FOOD YOU BOUGHT JUST DIDN'T LAST AND YOU DIDN'T HAVE MONEY TO GET MORE.: NEVER TRUE

## 2024-04-02 ASSESSMENT — PATIENT HEALTH QUESTIONNAIRE - PHQ9
SUM OF ALL RESPONSES TO PHQ QUESTIONS 1-9: 0
3. TROUBLE FALLING OR STAYING ASLEEP: NOT AT ALL
8. MOVING OR SPEAKING SO SLOWLY THAT OTHER PEOPLE COULD HAVE NOTICED. OR THE OPPOSITE, BEING SO FIGETY OR RESTLESS THAT YOU HAVE BEEN MOVING AROUND A LOT MORE THAN USUAL: NOT AT ALL
SUM OF ALL RESPONSES TO PHQ9 QUESTIONS 1 & 2: 0
7. TROUBLE CONCENTRATING ON THINGS, SUCH AS READING THE NEWSPAPER OR WATCHING TELEVISION: NOT AT ALL
4. FEELING TIRED OR HAVING LITTLE ENERGY: NOT AT ALL
SUM OF ALL RESPONSES TO PHQ QUESTIONS 1-9: 0
SUM OF ALL RESPONSES TO PHQ QUESTIONS 1-9: 0
10. IF YOU CHECKED OFF ANY PROBLEMS, HOW DIFFICULT HAVE THESE PROBLEMS MADE IT FOR YOU TO DO YOUR WORK, TAKE CARE OF THINGS AT HOME, OR GET ALONG WITH OTHER PEOPLE: NOT DIFFICULT AT ALL
SUM OF ALL RESPONSES TO PHQ QUESTIONS 1-9: 0
1. LITTLE INTEREST OR PLEASURE IN DOING THINGS: NOT AT ALL
9. THOUGHTS THAT YOU WOULD BE BETTER OFF DEAD, OR OF HURTING YOURSELF: NOT AT ALL
2. FEELING DOWN, DEPRESSED OR HOPELESS: NOT AT ALL
5. POOR APPETITE OR OVEREATING: NOT AT ALL
6. FEELING BAD ABOUT YOURSELF - OR THAT YOU ARE A FAILURE OR HAVE LET YOURSELF OR YOUR FAMILY DOWN: NOT AT ALL

## 2024-04-02 ASSESSMENT — ENCOUNTER SYMPTOMS
DIFFICULTY BREATHING: 0
TROUBLE SWALLOWING: 0
CONSTIPATION: 0
NAUSEA: 0
VOMITING: 0
CHEST TIGHTNESS: 0
SHORTNESS OF BREATH: 0
EYE DISCHARGE: 0
ABDOMINAL PAIN: 0
DIARRHEA: 0
COUGH: 0
EYE REDNESS: 0
HEMOPTYSIS: 0
BLOOD IN STOOL: 0

## 2024-04-02 ASSESSMENT — LIFESTYLE VARIABLES
HOW MANY STANDARD DRINKS CONTAINING ALCOHOL DO YOU HAVE ON A TYPICAL DAY: 1 OR 2
HOW OFTEN DO YOU HAVE A DRINK CONTAINING ALCOHOL: MONTHLY OR LESS

## 2024-04-02 ASSESSMENT — COPD QUESTIONNAIRES: COPD: 1

## 2024-04-02 NOTE — PROGRESS NOTES
by mouth twice daily Yes Sandra Osborne MD   propranolol (INDERAL LA) 80 MG extended release capsule Take 1 capsule by mouth once daily Yes Sandra Osborne MD   albuterol sulfate HFA (PROVENTIL;VENTOLIN;PROAIR) 108 (90 Base) MCG/ACT inhaler INHALE 2 PUFFS BY MOUTH EVERY 4 TO 6 HOURS AS NEEDED FOR SHORTNESS OF BREATH OR WHEEZING Yes Uday Walsh MD   albuterol (PROVENTIL) (2.5 MG/3ML) 0.083% nebulizer solution Take 3 mLs by nebulization every 6 hours as needed for Wheezing Yes Sandra Osborne MD   aspirin 81 MG EC tablet 1 tablet Yes Uday Walsh MD   Docusate Calcium (STOOL SOFTENER PO) Take  by mouth daily. Yes ProviderUday MD CareTeam (Including outside providers/suppliers regularly involved in providing care):   Patient Care Team:  Sandra Osborne MD as PCP - General (Family Medicine)  [facility] Osborne [facility]  Abdulaziz Parada MD as Consulting Physician (General Surgery)     Reviewed and updated this visit:  Tobacco  Allergies  Meds  Problems  Med Hx  Surg Hx  Soc Hx  Fam Hx             
Rhythm: Normal rate and regular rhythm.      Heart sounds: Normal heart sounds. No murmur heard.  Pulmonary:      Effort: Pulmonary effort is normal.      Breath sounds: Normal breath sounds.   Abdominal:      General: Bowel sounds are normal.      Palpations: Abdomen is soft.      Tenderness: There is no abdominal tenderness.   Musculoskeletal:      Cervical back: Neck supple.   Skin:     Findings: No erythema or rash.   Neurological:      Mental Status: He is alert and oriented to person, place, and time.   Psychiatric:         Mood and Affect: Mood normal.         Behavior: Behavior normal.         Vitals:  /70   Pulse 80   Ht 1.803 m (5' 11\")   Wt 96.2 kg (212 lb)   SpO2 96%   BMI 29.57 kg/m²       Data:     Lab Results   Component Value Date/Time     02/26/2024 02:19 PM    K 4.3 02/26/2024 02:19 PM     02/26/2024 02:19 PM    CO2 27 02/26/2024 02:19 PM    BUN 20 02/26/2024 02:19 PM    CREATININE 0.9 02/26/2024 02:19 PM    GLUCOSE 143 02/26/2024 02:19 PM    PROT 7.5 02/26/2024 02:19 PM    LABALBU 4.2 02/26/2024 02:19 PM    BILITOT 0.3 02/26/2024 02:19 PM    ALKPHOS 74 02/26/2024 02:19 PM    AST 19 02/26/2024 02:19 PM    ALT 15 02/26/2024 02:19 PM     Lab Results   Component Value Date/Time    WBC 9.8 02/26/2024 02:19 PM    RBC 5.01 02/26/2024 02:19 PM    HGB 15.6 02/26/2024 02:19 PM    HCT 47.3 02/26/2024 02:19 PM    MCV 94.4 02/26/2024 02:19 PM    MCH 31.1 02/26/2024 02:19 PM    MCHC 33.0 02/26/2024 02:19 PM    RDW 11.9 02/26/2024 02:19 PM     02/26/2024 02:19 PM    MPV 10.7 02/26/2024 02:19 PM     Lab Results   Component Value Date/Time    TSH 1.20 05/09/2018 09:07 AM     Lab Results   Component Value Date/Time    CHOL 164 02/27/2023 12:45 PM    CHOL 142.0 03/08/2021 12:00 AM    HDL 54 02/27/2023 12:45 PM    PSA 0.73 05/04/2015 08:41 AM    LABA1C 5.8 04/16/2019 01:57 PM          Assessment/Plan:        1. Other emphysema (HCC)  Stable and pt no longer smokes and over all stays

## 2024-04-02 NOTE — PATIENT INSTRUCTIONS
and other nicotine products. This includes smoking and vaping. If you need help quitting, talk to your doctor about stop-smoking programs and medicines. These can increase your chances of quitting for good. Quitting is one of the most important things you can do to protect your heart. It is never too late to quit. Try to avoid secondhand smoke too.     Stay at a weight that's healthy for you. Talk to your doctor if you need help losing weight.     Try to get 7 to 9 hours of sleep each night.     Limit alcohol to 2 drinks a day for men and 1 drink a day for women. Too much alcohol can cause health problems.     Manage other health problems such as diabetes, high blood pressure, and high cholesterol. If you think you may have a problem with alcohol or drug use, talk to your doctor.   Medicines    Take your medicines exactly as prescribed. Call your doctor if you think you are having a problem with your medicine.     If your doctor recommends aspirin, take the amount directed each day. Make sure you take aspirin and not another kind of pain reliever, such as acetaminophen (Tylenol).   When should you call for help?   Call 911 if you have symptoms of a heart attack. These may include:    Chest pain or pressure, or a strange feeling in the chest.     Sweating.     Shortness of breath.     Pain, pressure, or a strange feeling in the back, neck, jaw, or upper belly or in one or both shoulders or arms.     Lightheadedness or sudden weakness.     A fast or irregular heartbeat.   After you call 911, the  may tell you to chew 1 adult-strength or 2 to 4 low-dose aspirin. Wait for an ambulance. Do not try to drive yourself.  Watch closely for changes in your health, and be sure to contact your doctor if you have any problems.  Where can you learn more?  Go to https://www.healthwise.net/patientEd and enter F075 to learn more about \"A Healthy Heart: Care Instructions.\"  Current as of: June 24, 2023               Content

## 2024-05-04 DIAGNOSIS — I10 ESSENTIAL HYPERTENSION, BENIGN: ICD-10-CM

## 2024-05-06 RX ORDER — PROPRANOLOL HYDROCHLORIDE 80 MG/1
CAPSULE, EXTENDED RELEASE ORAL
Qty: 90 CAPSULE | Refills: 0 | Status: SHIPPED | OUTPATIENT
Start: 2024-05-06

## 2024-05-06 NOTE — TELEPHONE ENCOUNTER
Rx refill request via [employer]  Propanolol 80mg QD  Last OV 4/2/24 for AWV  Next appt 11/5/24

## 2024-05-08 DIAGNOSIS — I10 ESSENTIAL HYPERTENSION, BENIGN: ICD-10-CM

## 2024-05-08 RX ORDER — AMLODIPINE BESYLATE 5 MG/1
TABLET ORAL
Qty: 180 TABLET | Refills: 1 | Status: SHIPPED | OUTPATIENT
Start: 2024-05-08

## 2024-05-08 NOTE — TELEPHONE ENCOUNTER
Last OV 4/2/24 awv    Next OV 11/5/24    Requesting refill on amlodipine thru surescripts.  Rx pending

## 2024-06-26 DIAGNOSIS — I10 ESSENTIAL HYPERTENSION, BENIGN: ICD-10-CM

## 2024-06-26 RX ORDER — LISINOPRIL 30 MG/1
TABLET ORAL
Qty: 90 TABLET | Refills: 0 | Status: SHIPPED | OUTPATIENT
Start: 2024-06-26

## 2024-06-26 NOTE — TELEPHONE ENCOUNTER
Last OV: 4/2/2024  AWV and chronic   Last RX:    Next scheduled apt: 11/5/2024  chronic visit             Surescript requesting a refill

## 2024-07-19 DIAGNOSIS — I10 ESSENTIAL HYPERTENSION, BENIGN: ICD-10-CM

## 2024-07-19 RX ORDER — PROPRANOLOL HYDROCHLORIDE 80 MG/1
CAPSULE, EXTENDED RELEASE ORAL
Qty: 90 CAPSULE | Refills: 0 | Status: SHIPPED | OUTPATIENT
Start: 2024-07-19

## 2024-07-19 NOTE — TELEPHONE ENCOUNTER
Last OV 4/2/24 AWV    Next OV 11/5/24     Requesting refill on propranolol thru surescripts.  Rx pending

## 2024-08-21 RX ORDER — LOVASTATIN 40 MG/1
40 TABLET ORAL NIGHTLY
Qty: 90 TABLET | Refills: 0 | Status: SHIPPED | OUTPATIENT
Start: 2024-08-21

## 2024-08-21 NOTE — TELEPHONE ENCOUNTER
Last OV 4/2/24 for AWV and chronics  Requesting refill on lovastatin thru sure script  Next OV 11/5/24

## 2024-09-03 DIAGNOSIS — I10 ESSENTIAL HYPERTENSION, BENIGN: ICD-10-CM

## 2024-09-03 RX ORDER — PROPRANOLOL HYDROCHLORIDE 80 MG/1
CAPSULE, EXTENDED RELEASE ORAL
Qty: 90 CAPSULE | Refills: 1 | Status: SHIPPED | OUTPATIENT
Start: 2024-09-03

## 2024-09-10 DIAGNOSIS — F34.1 DYSTHYMIC DISORDER: ICD-10-CM

## 2024-10-15 DIAGNOSIS — I10 ESSENTIAL HYPERTENSION, BENIGN: ICD-10-CM

## 2024-10-15 RX ORDER — AMLODIPINE BESYLATE 5 MG/1
TABLET ORAL
Qty: 180 TABLET | Refills: 1 | Status: SHIPPED | OUTPATIENT
Start: 2024-10-15

## 2024-10-15 NOTE — TELEPHONE ENCOUNTER
Last OV: 4/2/2024  HTN, HLD   Last RX:    Next scheduled apt: 11/5/2024  COPD, HTN,HLD       Surescript requesting a refill   Medication  pending for approval

## 2024-11-19 RX ORDER — LOVASTATIN 40 MG/1
40 TABLET ORAL NIGHTLY
Qty: 90 TABLET | Refills: 1 | Status: SHIPPED | OUTPATIENT
Start: 2024-11-19

## 2024-11-19 NOTE — TELEPHONE ENCOUNTER
Last OV: 4/2/2024  AWV HLD   Last RX:    Next scheduled apt: 12/9/2024  HTN, COPD           Surescript requesting a refill   Medication pending for approval

## 2024-12-09 ENCOUNTER — OFFICE VISIT (OUTPATIENT)
Dept: FAMILY MEDICINE CLINIC | Age: 84
End: 2024-12-09

## 2024-12-09 VITALS
WEIGHT: 214 LBS | BODY MASS INDEX: 29.85 KG/M2 | DIASTOLIC BLOOD PRESSURE: 74 MMHG | SYSTOLIC BLOOD PRESSURE: 130 MMHG | OXYGEN SATURATION: 95 % | HEART RATE: 72 BPM

## 2024-12-09 DIAGNOSIS — F34.1 DYSTHYMIC DISORDER: ICD-10-CM

## 2024-12-09 DIAGNOSIS — E78.00 PURE HYPERCHOLESTEROLEMIA: ICD-10-CM

## 2024-12-09 DIAGNOSIS — I10 ESSENTIAL HYPERTENSION, BENIGN: ICD-10-CM

## 2024-12-09 DIAGNOSIS — J43.8 OTHER EMPHYSEMA (HCC): Primary | ICD-10-CM

## 2024-12-09 ASSESSMENT — ENCOUNTER SYMPTOMS
VOMITING: 0
TROUBLE SWALLOWING: 0
HOARSE VOICE: 0
RHINORRHEA: 0
COUGH: 0
WHEEZING: 0
BLOOD IN STOOL: 0
ABDOMINAL PAIN: 0
EYE REDNESS: 0
HEARTBURN: 0
EYE DISCHARGE: 0
DIARRHEA: 0
DIFFICULTY BREATHING: 0
NAUSEA: 0
SHORTNESS OF BREATH: 0

## 2024-12-09 ASSESSMENT — COPD QUESTIONNAIRES: COPD: 1

## 2024-12-09 NOTE — PATIENT INSTRUCTIONS
SURVEY:    You may be receiving a survey from [facility] regarding your visit today.    Please complete the survey to enable us to provide the highest quality of care to you and your family.    If you cannot score us a very good (5 Stars) on any question, please call the office to discuss how we could have made your experience a very good one.    Thank you.    Clinical Care Team: MD Junior Huynh LPN              Triage: Marietta Montana CMA              Clerical Team: Marietta Figueredo

## 2024-12-09 NOTE — PROGRESS NOTES
HPI Notes    Name: Lazaro Cleveland  : 1940        Chief Complaint:     Chief Complaint   Patient presents with    COPD     6 month check up    Hypertension    Hyperlipidemia    Mental Health Problem       History of Present Illness:     Lazaro Cleveland is a 84 y.o.  male who presents with COPD (6 month check up), Hypertension, Hyperlipidemia, and Mental Health Problem      COPD  There is no cough, difficulty breathing, hoarse voice, shortness of breath or wheezing. This is a chronic problem. The current episode started more than 1 year ago. The problem has been unchanged. Associated symptoms include dyspnea on exertion. Pertinent negatives include no appetite change, chest pain, ear pain, fever, headaches, heartburn, malaise/fatigue, postnasal drip, rhinorrhea, sneezing or trouble swallowing. His symptoms are alleviated by beta-agonist. He reports significant improvement on treatment. There is no history of COPD.   Hypertension  This is a chronic problem. The current episode started more than 1 year ago. The problem is unchanged. The problem is controlled. Pertinent negatives include no chest pain, headaches, malaise/fatigue, neck pain, palpitations, peripheral edema or shortness of breath. There are no associated agents to hypertension. Risk factors for coronary artery disease include dyslipidemia and male gender. The current treatment provides significant improvement.   Hyperlipidemia  This is a chronic problem. The current episode started more than 1 year ago. The problem is controlled. Recent lipid tests were reviewed and are normal. Pertinent negatives include no chest pain or shortness of breath. Current antihyperlipidemic treatment includes statins. The current treatment provides significant improvement of lipids. Risk factors for coronary artery disease include dyslipidemia and hypertension.   Mental Health Problem  The primary symptoms do not include dysphoric mood or delusions. The current episode

## 2024-12-21 DIAGNOSIS — I10 ESSENTIAL HYPERTENSION, BENIGN: ICD-10-CM

## 2024-12-23 RX ORDER — LISINOPRIL 30 MG/1
TABLET ORAL
Qty: 90 TABLET | Refills: 1 | Status: SHIPPED | OUTPATIENT
Start: 2024-12-23

## 2025-02-10 ENCOUNTER — HOSPITAL ENCOUNTER (OUTPATIENT)
Age: 85
Discharge: HOME | End: 2025-02-10
Payer: MEDICARE

## 2025-02-10 DIAGNOSIS — R91.8 PULMONARY NODULES: ICD-10-CM

## 2025-02-10 DIAGNOSIS — C49.22 LEIOMYOSARCOMA OF LEG, LEFT (HCC): ICD-10-CM

## 2025-02-10 LAB
ALBUMIN SERPL-MCNC: 4.4 G/DL (ref 3.5–5.2)
ALBUMIN/GLOB SERPL: 1.5 {RATIO} (ref 1–2.5)
ALP SERPL-CCNC: 79 U/L (ref 40–129)
ALT SERPL-CCNC: 17 U/L (ref 10–50)
ANION GAP SERPL CALCULATED.3IONS-SCNC: 7 MMOL/L (ref 9–16)
AST SERPL-CCNC: 20 U/L (ref 10–50)
BASOPHILS # BLD: 0.06 K/UL (ref 0–0.2)
BASOPHILS NFR BLD: 1 % (ref 0–2)
BILIRUB SERPL-MCNC: 0.5 MG/DL (ref 0–1.2)
BUN SERPL-MCNC: 21 MG/DL (ref 8–23)
BUN/CREAT SERPL: 21 (ref 9–20)
CALCIUM SERPL-MCNC: 9.4 MG/DL (ref 8.6–10.4)
CHLORIDE SERPL-SCNC: 103 MMOL/L (ref 98–107)
CO2 SERPL-SCNC: 32 MMOL/L (ref 20–31)
CREAT SERPL-MCNC: 1 MG/DL (ref 0.7–1.2)
EOSINOPHIL # BLD: 0.36 K/UL (ref 0–0.44)
EOSINOPHILS RELATIVE PERCENT: 4 % (ref 1–4)
ERYTHROCYTE [DISTWIDTH] IN BLOOD BY AUTOMATED COUNT: 11.9 % (ref 11.8–14.4)
GFR, ESTIMATED: 75 ML/MIN/1.73M2
GLUCOSE SERPL-MCNC: 116 MG/DL (ref 74–99)
HCT VFR BLD AUTO: 47.5 % (ref 40.7–50.3)
HGB BLD-MCNC: 15.8 G/DL (ref 13–17)
IMM GRANULOCYTES # BLD AUTO: <0.03 K/UL (ref 0–0.3)
IMM GRANULOCYTES NFR BLD: 0 %
LYMPHOCYTES NFR BLD: 2.46 K/UL (ref 1.1–3.7)
LYMPHOCYTES RELATIVE PERCENT: 27 % (ref 24–43)
MCH RBC QN AUTO: 31.6 PG (ref 25.2–33.5)
MCHC RBC AUTO-ENTMCNC: 33.3 G/DL (ref 28.4–34.8)
MCV RBC AUTO: 95 FL (ref 82.6–102.9)
MONOCYTES NFR BLD: 0.78 K/UL (ref 0.1–1.2)
MONOCYTES NFR BLD: 9 % (ref 3–12)
NEUTROPHILS NFR BLD: 59 % (ref 36–65)
NEUTS SEG NFR BLD: 5.52 K/UL (ref 1.5–8.1)
NRBC BLD-RTO: 0 PER 100 WBC
PLATELET # BLD AUTO: 248 K/UL (ref 138–453)
PMV BLD AUTO: 10.5 FL (ref 8.1–13.5)
POTASSIUM SERPL-SCNC: 4.9 MMOL/L (ref 3.7–5.3)
PROT SERPL-MCNC: 7.2 G/DL (ref 6.6–8.7)
RBC # BLD AUTO: 5 M/UL (ref 4.21–5.77)
SODIUM SERPL-SCNC: 142 MMOL/L (ref 136–145)
WBC OTHER # BLD: 9.2 K/UL (ref 3.5–11.3)

## 2025-02-10 PROCEDURE — 85025 COMPLETE CBC W/AUTO DIFF WBC: CPT

## 2025-02-10 PROCEDURE — 80053 COMPREHEN METABOLIC PANEL: CPT

## 2025-02-10 PROCEDURE — 36415 COLL VENOUS BLD VENIPUNCTURE: CPT

## 2025-02-27 ENCOUNTER — HOSPITAL ENCOUNTER (OUTPATIENT)
Dept: CT IMAGING | Age: 85
Discharge: HOME | End: 2025-03-01
Attending: INTERNAL MEDICINE
Payer: MEDICARE

## 2025-02-27 ENCOUNTER — HOSPITAL ENCOUNTER (OUTPATIENT)
Age: 85
Discharge: HOME | End: 2025-02-27
Attending: INTERNAL MEDICINE
Payer: MEDICARE

## 2025-02-27 DIAGNOSIS — R91.8 PULMONARY NODULES: ICD-10-CM

## 2025-02-27 DIAGNOSIS — C49.22 LEIOMYOSARCOMA OF LEG, LEFT (HCC): ICD-10-CM

## 2025-02-27 PROCEDURE — 6360000004 HC RX CONTRAST MEDICATION: Performed by: INTERNAL MEDICINE

## 2025-02-27 PROCEDURE — 71260 CT THORAX DX C+: CPT

## 2025-02-27 RX ORDER — IOPAMIDOL 755 MG/ML
75 INJECTION, SOLUTION INTRAVASCULAR
Status: COMPLETED | OUTPATIENT
Start: 2025-02-27 | End: 2025-02-27

## 2025-02-27 RX ADMIN — IOPAMIDOL 75 ML: 755 INJECTION, SOLUTION INTRAVENOUS at 11:19

## 2025-02-28 DIAGNOSIS — I10 ESSENTIAL HYPERTENSION, BENIGN: ICD-10-CM

## 2025-02-28 RX ORDER — PROPRANOLOL HYDROCHLORIDE 80 MG/1
CAPSULE, EXTENDED RELEASE ORAL
Qty: 90 CAPSULE | Refills: 0 | Status: SHIPPED | OUTPATIENT
Start: 2025-02-28

## 2025-03-12 ENCOUNTER — OFFICE VISIT (OUTPATIENT)
Dept: ONCOLOGY | Age: 85
End: 2025-03-12
Payer: MEDICARE

## 2025-03-12 VITALS
RESPIRATION RATE: 18 BRPM | DIASTOLIC BLOOD PRESSURE: 60 MMHG | HEART RATE: 60 BPM | WEIGHT: 209 LBS | TEMPERATURE: 98.7 F | BODY MASS INDEX: 29.15 KG/M2 | SYSTOLIC BLOOD PRESSURE: 140 MMHG

## 2025-03-12 DIAGNOSIS — C49.22 LEIOMYOSARCOMA OF LEG, LEFT (HCC): Primary | ICD-10-CM

## 2025-03-12 PROCEDURE — 99214 OFFICE O/P EST MOD 30 MIN: CPT | Performed by: INTERNAL MEDICINE

## 2025-03-12 PROCEDURE — G8427 DOCREV CUR MEDS BY ELIG CLIN: HCPCS | Performed by: INTERNAL MEDICINE

## 2025-03-12 PROCEDURE — 99211 OFF/OP EST MAY X REQ PHY/QHP: CPT | Performed by: INTERNAL MEDICINE

## 2025-03-12 PROCEDURE — G8417 CALC BMI ABV UP PARAM F/U: HCPCS | Performed by: INTERNAL MEDICINE

## 2025-03-12 PROCEDURE — 1159F MED LIST DOCD IN RCRD: CPT | Performed by: INTERNAL MEDICINE

## 2025-03-12 PROCEDURE — 1123F ACP DISCUSS/DSCN MKR DOCD: CPT | Performed by: INTERNAL MEDICINE

## 2025-03-12 PROCEDURE — 3077F SYST BP >= 140 MM HG: CPT | Performed by: INTERNAL MEDICINE

## 2025-03-12 PROCEDURE — 3078F DIAST BP <80 MM HG: CPT | Performed by: INTERNAL MEDICINE

## 2025-03-12 PROCEDURE — 1036F TOBACCO NON-USER: CPT | Performed by: INTERNAL MEDICINE

## 2025-03-12 PROCEDURE — 1126F AMNT PAIN NOTED NONE PRSNT: CPT | Performed by: INTERNAL MEDICINE

## 2025-03-12 NOTE — PROGRESS NOTES
Chief Complaint   Patient presents with    Follow-up     Leiomyosarcoma of leg, left       DIAGNOSIS:   Leiomyosarcoma of the left leg  CURRENT THERAPY:  Status post wide excision in 2018  BRIEF CASE HISTORY:   Lazaro Cleveland is a very pleasant 84 y.o. male who was followed by Dr. Cuello because of leiomyosarcoma of the left leg.  He had a nodule  on the left shin since childhood but in 2018 started getting bigger.  He had that excised and showed leiomyosarcoma low-grade.  He had a complete resection and required multiple grafting to make sure it is completely closed.  He was referred to oncology and was followed conservatively.  INTERIM HISTORY:  The patient comes in today for a follow up, he is doing well without any symptoms.  Specifically he denies any bleeding or bruising.  The area is completely covered by skin and no ulcers.  He has chronic fatigue and some shortness of breath but likely related to COPD.  Symptoms are much better recently.  Very minimal shortness of breath.  No hemoptysis.  No chest pain.  PAST MEDICAL HISTORY: has a past medical history of Cancer (HCC), COPD (chronic obstructive pulmonary disease) (HCC), Hyperlipidemia, Hypertension, and Migraine.    PAST SURGICAL HISTORY: has a past surgical history that includes Tonsillectomy; hernia repair; Hemorrhoid surgery; EXCISION / BIOPSY SKIN LESION OF LEG (Left, 11/13/2017); Skin graft (Left, 11/22/2017); EXCISION / BIOPSY SKIN LESION OF LEG (11/22/2017); pr office/outpt visit,procedure only (Left, 1/24/2018); pr office/outpt visit,procedure only (Left, 2/1/2018); pr office/outpt visit,procedure only (Left, 2/12/2018); pr office/outpt visit,procedure only (Left, 2/26/2018); pr office/outpt visit,procedure only (Left, 3/12/2018); pr office/outpt visit,procedure only (Left, 3/19/2018); pr office/outpt visit,procedure only (Left, 3/26/2018); pr office/outpt visit,procedure only (Left, 4/2/2018); pr office/outpt visit,procedure only (Left, 4/11/2018);

## 2025-03-20 DIAGNOSIS — F34.1 DYSTHYMIC DISORDER: ICD-10-CM

## 2025-04-29 ENCOUNTER — TELEPHONE (OUTPATIENT)
Dept: FAMILY MEDICINE CLINIC | Age: 85
End: 2025-04-29

## 2025-04-29 DIAGNOSIS — I10 ESSENTIAL HYPERTENSION, BENIGN: ICD-10-CM

## 2025-04-29 DIAGNOSIS — R73.9 HYPERGLYCEMIA: Primary | ICD-10-CM

## 2025-04-29 NOTE — TELEPHONE ENCOUNTER
Patient needs Lipid order put in - he had asked Ty to send the  order to [facility] but I believe it was  - he will just come in next week early and do it before his appointment

## 2025-05-06 ENCOUNTER — OFFICE VISIT (OUTPATIENT)
Dept: FAMILY MEDICINE CLINIC | Age: 85
End: 2025-05-06

## 2025-05-06 ENCOUNTER — HOSPITAL ENCOUNTER (OUTPATIENT)
Age: 85
Discharge: HOME | End: 2025-05-06
Payer: MEDICARE

## 2025-05-06 VITALS
HEIGHT: 71 IN | SYSTOLIC BLOOD PRESSURE: 132 MMHG | WEIGHT: 209 LBS | HEART RATE: 74 BPM | DIASTOLIC BLOOD PRESSURE: 70 MMHG | BODY MASS INDEX: 29.26 KG/M2 | OXYGEN SATURATION: 96 %

## 2025-05-06 DIAGNOSIS — J43.1 PANLOBULAR EMPHYSEMA (HCC): ICD-10-CM

## 2025-05-06 DIAGNOSIS — I10 ESSENTIAL HYPERTENSION, BENIGN: ICD-10-CM

## 2025-05-06 DIAGNOSIS — Z00.00 MEDICARE ANNUAL WELLNESS VISIT, SUBSEQUENT: Primary | ICD-10-CM

## 2025-05-06 DIAGNOSIS — F34.1 DYSTHYMIC DISORDER: ICD-10-CM

## 2025-05-06 DIAGNOSIS — E78.00 PURE HYPERCHOLESTEROLEMIA: ICD-10-CM

## 2025-05-06 LAB
CHOLEST SERPL-MCNC: 149 MG/DL (ref 0–199)
CHOLESTEROL/HDL RATIO: 2.7
HDLC SERPL-MCNC: 55 MG/DL
LDLC SERPL CALC-MCNC: 74 MG/DL (ref 0–100)
TRIGL SERPL-MCNC: 98 MG/DL
VLDLC SERPL CALC-MCNC: 20 MG/DL (ref 1–30)

## 2025-05-06 PROCEDURE — 80061 LIPID PANEL: CPT

## 2025-05-06 PROCEDURE — 36415 COLL VENOUS BLD VENIPUNCTURE: CPT

## 2025-05-06 RX ORDER — AMLODIPINE BESYLATE 5 MG/1
TABLET ORAL
Qty: 180 TABLET | Refills: 1 | Status: SHIPPED | OUTPATIENT
Start: 2025-05-06

## 2025-05-06 RX ORDER — LOVASTATIN 40 MG/1
40 TABLET ORAL NIGHTLY
Qty: 90 TABLET | Refills: 1 | Status: SHIPPED | OUTPATIENT
Start: 2025-05-06

## 2025-05-06 RX ORDER — ALBUTEROL SULFATE 0.83 MG/ML
2.5 SOLUTION RESPIRATORY (INHALATION) EVERY 6 HOURS PRN
Qty: 60 EACH | Refills: 2 | Status: SHIPPED | OUTPATIENT
Start: 2025-05-06

## 2025-05-06 RX ORDER — LISINOPRIL 30 MG/1
TABLET ORAL
Qty: 90 TABLET | Refills: 1 | Status: SHIPPED | OUTPATIENT
Start: 2025-05-06

## 2025-05-06 RX ORDER — PROPRANOLOL HYDROCHLORIDE 80 MG/1
CAPSULE, EXTENDED RELEASE ORAL
Qty: 90 CAPSULE | Refills: 0 | Status: SHIPPED | OUTPATIENT
Start: 2025-05-06

## 2025-05-06 SDOH — ECONOMIC STABILITY: FOOD INSECURITY: WITHIN THE PAST 12 MONTHS, YOU WORRIED THAT YOUR FOOD WOULD RUN OUT BEFORE YOU GOT MONEY TO BUY MORE.: NEVER TRUE

## 2025-05-06 SDOH — ECONOMIC STABILITY: FOOD INSECURITY: WITHIN THE PAST 12 MONTHS, THE FOOD YOU BOUGHT JUST DIDN'T LAST AND YOU DIDN'T HAVE MONEY TO GET MORE.: NEVER TRUE

## 2025-05-06 ASSESSMENT — PATIENT HEALTH QUESTIONNAIRE - PHQ9
2. FEELING DOWN, DEPRESSED OR HOPELESS: NOT AT ALL
3. TROUBLE FALLING OR STAYING ASLEEP: NOT AT ALL
SUM OF ALL RESPONSES TO PHQ QUESTIONS 1-9: 0
6. FEELING BAD ABOUT YOURSELF - OR THAT YOU ARE A FAILURE OR HAVE LET YOURSELF OR YOUR FAMILY DOWN: NOT AT ALL
SUM OF ALL RESPONSES TO PHQ QUESTIONS 1-9: 0
10. IF YOU CHECKED OFF ANY PROBLEMS, HOW DIFFICULT HAVE THESE PROBLEMS MADE IT FOR YOU TO DO YOUR WORK, TAKE CARE OF THINGS AT HOME, OR GET ALONG WITH OTHER PEOPLE: NOT DIFFICULT AT ALL
8. MOVING OR SPEAKING SO SLOWLY THAT OTHER PEOPLE COULD HAVE NOTICED. OR THE OPPOSITE, BEING SO FIGETY OR RESTLESS THAT YOU HAVE BEEN MOVING AROUND A LOT MORE THAN USUAL: NOT AT ALL
1. LITTLE INTEREST OR PLEASURE IN DOING THINGS: NOT AT ALL
9. THOUGHTS THAT YOU WOULD BE BETTER OFF DEAD, OR OF HURTING YOURSELF: NOT AT ALL
5. POOR APPETITE OR OVEREATING: NOT AT ALL
4. FEELING TIRED OR HAVING LITTLE ENERGY: NOT AT ALL
7. TROUBLE CONCENTRATING ON THINGS, SUCH AS READING THE NEWSPAPER OR WATCHING TELEVISION: NOT AT ALL

## 2025-05-06 ASSESSMENT — ENCOUNTER SYMPTOMS
BLURRED VISION: 0
FREQUENT THROAT CLEARING: 0
SHORTNESS OF BREATH: 1
TROUBLE SWALLOWING: 0
COUGH: 0
HOARSE VOICE: 0
HEMOPTYSIS: 0

## 2025-05-06 ASSESSMENT — COPD QUESTIONNAIRES: COPD: 1

## 2025-05-06 NOTE — PROGRESS NOTES
HPI Notes    Name: Lazaro Cleveland  : 1940        Chief Complaint:     Chief Complaint   Patient presents with    Medicare AWV     Patient here today for AWV    Hypertension    Hyperlipidemia    COPD     Emphysema on HCC gap    Mental Health Problem       History of Present Illness:     Lazaro Cleveland is a 85 y.o.  male who presents with Medicare AWV (Patient here today for AWV), Hypertension, Hyperlipidemia, COPD (Emphysema on HCC gap), and Mental Health Problem      Hypertension  This is a chronic problem. The current episode started more than 1 year ago. The problem is unchanged. The problem is controlled. Associated symptoms include peripheral edema and shortness of breath. Pertinent negatives include no blurred vision, chest pain, headaches, malaise/fatigue or palpitations. There are no associated agents to hypertension. Risk factors for coronary artery disease include dyslipidemia and male gender. The current treatment provides significant improvement.   Hyperlipidemia  This is a chronic problem. The current episode started more than 1 year ago. The problem is controlled. Recent lipid tests were reviewed and are normal. He has no history of diabetes or hypothyroidism. Associated symptoms include shortness of breath. Pertinent negatives include no chest pain. Current antihyperlipidemic treatment includes statins. The current treatment provides significant improvement of lipids. Risk factors for coronary artery disease include dyslipidemia, hypertension and male sex.   COPD  He complains of shortness of breath. There is no cough, frequent throat clearing, hemoptysis or hoarse voice. Primary symptoms comments: Occ phlegm from per pt sinus drainage. . This is a chronic problem. The problem has been gradually worsening. Pertinent negatives include no appetite change, chest pain, ear congestion, ear pain, fever, headaches, malaise/fatigue, nasal congestion, orthopnea, sneezing or trouble swallowing. Risk

## 2025-05-06 NOTE — PROGRESS NOTES
Medicare Annual Wellness Visit    Lazaro CHAMBERS Megha is here for Medicare AWV (Patient here today for AWV), Hypertension, Hyperlipidemia, COPD (Emphysema on HCC gap), and Mental Health Problem    Assessment & Plan   Pure hypercholesterolemia  Essential hypertension, benign  The following orders have not been finalized:  -     propranolol (INDERAL LA) 80 MG extended release capsule  -     lisinopril (PRINIVIL;ZESTRIL) 30 MG tablet  -     amLODIPine (NORVASC) 5 MG tablet  Panlobular emphysema (HCC)  The following orders have not been finalized:  -     albuterol (PROVENTIL) (2.5 MG/3ML) 0.083% nebulizer solution  Dysthymic disorder  The following orders have not been finalized:  -     sertraline (ZOLOFT) 50 MG tablet       Return in about 6 months (around 11/6/2025) for HTN, Hyperlipidemia, COPD, dysthymia.     Subjective   The following acute and/or chronic problems were also addressed today:  1.) Medicare wellness  2.) chronics -- see note    Patient's complete Health Risk Assessment and screening values have been reviewed and are found in Flowsheets. The following problems were reviewed today and where indicated follow up appointments were made and/or referrals ordered.    Positive Risk Factor Screenings with Interventions:    Fall Risk:  Do you feel unsteady or are you worried about falling? : no  2 or more falls in past year?: no  Fall with injury in past year?: (!) yes (Fell on concrete, hit face. Had to go to ED.)     Interventions:    Reviewed medications, home hazards, visual acuity, and co-morbidities that can increase risk for falls                               Objective   Vitals:    05/06/25 1328   BP: 132/70   Pulse: 74   SpO2: 96%   Weight: 94.8 kg (209 lb)   Height: 1.803 m (5' 11\")      Body mass index is 29.15 kg/m².      PE - see 6mos           No Known Allergies  Prior to Visit Medications    Medication Sig Taking? Authorizing Provider   sertraline (ZOLOFT) 50 MG tablet Take 1 tablet by mouth once daily

## 2025-05-06 NOTE — PATIENT INSTRUCTIONS
SURVEY:    You may be receiving a survey from [employer] regarding your visit today.    Please complete the survey to enable us to provide the highest quality of care to you and your family.      Thank you.    Clinical Care Team: MD Junior Huynh LPN              Triage: Marietta Montana CMA              Clerical Team: Marietta Figueredo        Preventing Falls: Care Instructions  Injuries and health problems such as trouble walking or poor eyesight can increase your risk of falling. So can some medicines. But there are things you can do to help prevent falls. You can exercise to get stronger. You can also arrange your home to make it safer.    Talk to your doctor about the medicines you take. Ask if any of them increase the risk of falls and whether they can be changed or stopped.   Try to exercise regularly. It can help improve your strength and balance. This can help lower your risk of falling.         Practice fall safety and prevention.   Wear low-heeled shoes that fit well and give your feet good support. Talk to your doctor if you have foot problems that make this hard.  Carry a cellphone or wear a medical alert device that you can use to call for help.  Use stepladders instead of chairs to reach high objects. Don't climb if you're at risk for falls. Ask for help, if needed.  Wear the correct eyeglasses, if you need them.        Make your home safer.   Remove rugs, cords, clutter, and furniture from walkways.  Keep your house well lit. Use night-lights in hallways and bathrooms.  Install and use sturdy handrails on stairways.  Wear nonskid footwear, even inside. Don't walk barefoot or in socks without shoes.        Be safe outside.   Use handrails, curb cuts, and ramps whenever possible.  Keep your hands free by using a shoulder bag or backpack.  Try to walk in well-lit areas.

## 2025-05-07 ENCOUNTER — RESULTS FOLLOW-UP (OUTPATIENT)
Dept: FAMILY MEDICINE CLINIC | Age: 85
End: 2025-05-07

## 2025-05-29 DIAGNOSIS — I10 ESSENTIAL HYPERTENSION, BENIGN: ICD-10-CM

## 2025-05-30 RX ORDER — PROPRANOLOL HYDROCHLORIDE 80 MG/1
80 CAPSULE, EXTENDED RELEASE ORAL DAILY
Qty: 90 CAPSULE | Refills: 0 | Status: SHIPPED | OUTPATIENT
Start: 2025-05-30

## 2025-05-30 NOTE — TELEPHONE ENCOUNTER
Last visit:  5/6/2025  Next Visit Date:    Future Appointments   Date Time Provider Department Center   11/6/2025  2:40 PM Sandra Osborne MD [facility]   2/23/2026  9:15 AM [facility] CT 2 [facility]         Medication List:  Prior to Admission medications    Medication Sig Start Date End Date Taking? Authorizing Provider   amLODIPine (NORVASC) 5 MG tablet Take 1 tablet by mouth twice daily 5/6/25   Sandra Osborne MD   lisinopril (PRINIVIL;ZESTRIL) 30 MG tablet Take 1 tablet by mouth once daily 5/6/25   Sandra Osborne MD   lovastatin (MEVACOR) 40 MG tablet Take 1 tablet by mouth nightly 5/6/25   Sandra Osborne MD   propranolol (INDERAL LA) 80 MG extended release capsule Take 1 capsule by mouth once daily 5/6/25   Sandra Osborne MD   sertraline (ZOLOFT) 50 MG tablet Take 1 tablet by mouth daily 5/6/25   Sandra Osborne MD   albuterol (PROVENTIL) (2.5 MG/3ML) 0.083% nebulizer solution Take 3 mLs by nebulization every 6 hours as needed for Wheezing 5/6/25   Sandra Osborne MD   albuterol sulfate HFA (PROVENTIL;VENTOLIN;PROAIR) 108 (90 Base) MCG/ACT inhaler  5/22/23   Uday Walsh MD   aspirin 81 MG EC tablet 1 tablet    Uday Walsh MD   Docusate Calcium (STOOL SOFTENER PO) Take  by mouth daily.    Uday Walsh MD

## (undated) DEVICE — Z DISCONTINUED USE 2624853 GLOVE SURG SZ 75 L12IN THK91MIL BRN LTX FREE

## (undated) DEVICE — 1.5 TO 1 DERMACARRIER: Brand: MESHGRAFTTM  II TISSUE EXPANSION SYSTEM

## (undated) DEVICE — DRAPE SURG L 60X76IN SMS FAB UNIV ANCIL RESIST FLAME TEARING

## (undated) DEVICE — GAUZE,SPONGE,4"X4",16PLY,XRAY,STRL,LF: Brand: MEDLINE

## (undated) DEVICE — GOWN,AURORA,NONRNF,XL,30/CS: Brand: MEDLINE

## (undated) DEVICE — TOWEL,OR,DSP,ST,BLUE,STD,4/PK,20PK/CS: Brand: MEDLINE

## (undated) DEVICE — SPONGE GZ W4XL4IN COT 12 PLY TYP VII WVN C FLD DSGN

## (undated) DEVICE — AMD ANTIMICROBIAL BANDAGE ROLL,6 PLY: Brand: KERLIX

## (undated) DEVICE — ELECTRODE ES AD CRD L15FT DISP FOR PT BELOW 30LB REM

## (undated) DEVICE — RESERVOIR 44103 VENT. INLET SIDE

## (undated) DEVICE — SKIN MARKER,REGULAR TIP WITH RULER: Brand: DEVON

## (undated) DEVICE — TIP ELECSURG BOVIE

## (undated) DEVICE — INTENDED FOR TISSUE SEPARATION, AND OTHER PROCEDURES THAT REQUIRE A SHARP SURGICAL BLADE TO PUNCTURE OR CUT.: Brand: BARD-PARKER ® CARBON RIB-BACK BLADES

## (undated) DEVICE — DISCONTINUED USE 111569 BF APPLICATOR COTN TIP 6IN ST

## (undated) DEVICE — Z DISCONTINUED BY MEDLINE USE 2711682 TRAY SKIN PREP DRY W/ PREM GLV

## (undated) DEVICE — 3M™ STERI-STRIP™ COMPOUND BENZOIN TINCTURE 40 BAGS/CARTON 4 CARTONS/CASE C1544: Brand: 3M™ STERI-STRIP™

## (undated) DEVICE — NON-ADHERENT DRESSING: Brand: TELFA

## (undated) DEVICE — DBD-PACK,LAPAROTOMY,2 REINFORCED GOWNS: Brand: MEDLINE

## (undated) DEVICE — Device

## (undated) DEVICE — 3M™ STERI-STRIP™ REINFORCED ADHESIVE SKIN CLOSURES, R1549, 1/2 IN X 2 IN (12 MM X 50 MM), 6 STRIPS/ENVELOPE: Brand: 3M™ STERI-STRIP™

## (undated) DEVICE — VELCLOSE LATEX FREE ELASTIC BANDAGE D/L 6INX10YD

## (undated) DEVICE — TABLE COVER: Brand: CONVERTORS

## (undated) DEVICE — CONMED ACCESSORY ELECTRODE, NEEDLE ELECTRODE: Brand: CONMED

## (undated) DEVICE — SYRINGE EAR 2OZ ULC SLIMMER TIP FLAT BTM SUCT PWR DISP FOR

## (undated) DEVICE — APPLIER LIG CLP M L11IN TI STR RNG HNDL FOR 20 CLP DISP

## (undated) DEVICE — NDLCTR: FOAM/MAG 40CT 64/CS: Brand: MEDICAL ACTION INDUSTRIES

## (undated) DEVICE — DRAPE,UTILTY,TAPE,15X26, 4EA/PK: Brand: MEDLINE

## (undated) DEVICE — SOLUTION IV IRRIG POUR BRL 0.9% SODIUM CHL 2F7124

## (undated) DEVICE — DISCONTINUED USE 393278 SYRINGE 10 ML HYPO W/O NDL LL TP PLSTC ST

## (undated) DEVICE — STANDARD HYPODERMIC NEEDLE,POLYPROPYLENE HUB: Brand: MONOJECT

## (undated) DEVICE — PREP PAD BNS: Brand: CONVERTORS

## (undated) DEVICE — NEEDLE HYPO 18GA L1IN PNK POLYPR HUB S STL REG BVL STR W/O

## (undated) DEVICE — PAD,ABDOMINAL,5"X9",STERILE,LF,1/PK: Brand: MEDLINE INDUSTRIES, INC.

## (undated) DEVICE — CHLORAPREP 26ML ORANGE

## (undated) DEVICE — SYRINGE MED 10ML LUERLOCK TIP W/O SFTY DISP

## (undated) DEVICE — CLEANER ES TIP W2XL2IN ADH BK RADPQ FOR S STL ELECTRD

## (undated) DEVICE — MEDI-VAC NON-CONDUCTIVE SUCTION TUBING 6MM X 6.1M (20 FT.) L: Brand: CARDINAL HEALTH

## (undated) DEVICE — GLOVE SURG SZ 75 L12IN FNGR THK87MIL WHT LTX FREE

## (undated) DEVICE — YANKAUER SUCTION INSTRUMENT NO CONTROL VENT, BULB TIP, CLEAR: Brand: YANKAUER